# Patient Record
Sex: MALE | Race: BLACK OR AFRICAN AMERICAN | NOT HISPANIC OR LATINO | Employment: OTHER | URBAN - METROPOLITAN AREA
[De-identification: names, ages, dates, MRNs, and addresses within clinical notes are randomized per-mention and may not be internally consistent; named-entity substitution may affect disease eponyms.]

---

## 2019-02-17 ENCOUNTER — APPOINTMENT (OUTPATIENT)
Dept: ULTRASOUND IMAGING | Facility: HOSPITAL | Age: 49
End: 2019-02-17

## 2019-02-17 ENCOUNTER — HOSPITAL ENCOUNTER (OUTPATIENT)
Facility: HOSPITAL | Age: 49
Setting detail: OBSERVATION
Discharge: HOME | End: 2019-02-17
Attending: EMERGENCY MEDICINE | Admitting: EMERGENCY MEDICINE

## 2019-02-17 ENCOUNTER — APPOINTMENT (OUTPATIENT)
Dept: CT IMAGING | Facility: HOSPITAL | Age: 49
End: 2019-02-17

## 2019-02-17 VITALS
TEMPERATURE: 99.1 F | WEIGHT: 214.8 LBS | OXYGEN SATURATION: 95 % | HEART RATE: 83 BPM | HEIGHT: 72 IN | SYSTOLIC BLOOD PRESSURE: 178 MMHG | BODY MASS INDEX: 29.09 KG/M2 | DIASTOLIC BLOOD PRESSURE: 104 MMHG | RESPIRATION RATE: 18 BRPM

## 2019-02-17 DIAGNOSIS — R11.2 NON-INTRACTABLE VOMITING WITH NAUSEA, UNSPECIFIED VOMITING TYPE: ICD-10-CM

## 2019-02-17 DIAGNOSIS — K85.00 IDIOPATHIC ACUTE PANCREATITIS, UNSPECIFIED COMPLICATION STATUS: Primary | ICD-10-CM

## 2019-02-17 PROBLEM — D72.829 LEUKOCYTOSIS: Status: ACTIVE | Noted: 2019-02-17

## 2019-02-17 PROBLEM — I10 HTN (HYPERTENSION): Status: ACTIVE | Noted: 2019-02-17

## 2019-02-17 PROBLEM — R10.9 ABDOMINAL PAIN: Status: ACTIVE | Noted: 2019-02-17

## 2019-02-17 PROBLEM — R73.9 HYPERGLYCEMIA: Status: ACTIVE | Noted: 2019-02-17

## 2019-02-17 LAB
ALBUMIN SERPL-MCNC: 4.93 G/DL (ref 3.2–4.8)
ALBUMIN/GLOB SERPL: 1.5 G/DL (ref 1.5–2.5)
ALP SERPL-CCNC: 66 U/L (ref 25–100)
ALT SERPL W P-5'-P-CCNC: 23 U/L (ref 7–40)
ANION GAP SERPL CALCULATED.3IONS-SCNC: 8 MMOL/L (ref 3–11)
ARTICHOKE IGE QN: 100 MG/DL (ref 0–130)
AST SERPL-CCNC: 35 U/L (ref 0–33)
BASOPHILS # BLD AUTO: 0.02 10*3/MM3 (ref 0–0.2)
BASOPHILS NFR BLD AUTO: 0.2 % (ref 0–1)
BILIRUB SERPL-MCNC: 0.7 MG/DL (ref 0.3–1.2)
BILIRUB UR QL STRIP: NEGATIVE
BUN BLD-MCNC: 12 MG/DL (ref 9–23)
BUN/CREAT SERPL: 9.3 (ref 7–25)
CALCIUM SPEC-SCNC: 9.9 MG/DL (ref 8.7–10.4)
CHLORIDE SERPL-SCNC: 105 MMOL/L (ref 99–109)
CHOLEST SERPL-MCNC: 168 MG/DL (ref 0–200)
CLARITY UR: CLEAR
CO2 SERPL-SCNC: 26 MMOL/L (ref 20–31)
COLOR UR: YELLOW
CREAT BLD-MCNC: 1.29 MG/DL (ref 0.6–1.3)
DEPRECATED RDW RBC AUTO: 44.2 FL (ref 37–54)
EOSINOPHIL # BLD AUTO: 0.08 10*3/MM3 (ref 0–0.3)
EOSINOPHIL NFR BLD AUTO: 0.7 % (ref 0–3)
ERYTHROCYTE [DISTWIDTH] IN BLOOD BY AUTOMATED COUNT: 12.5 % (ref 11.3–14.5)
GFR SERPL CREATININE-BSD FRML MDRD: 73 ML/MIN/1.73
GLOBULIN UR ELPH-MCNC: 3.4 GM/DL
GLUCOSE BLD-MCNC: 188 MG/DL (ref 70–100)
GLUCOSE UR STRIP-MCNC: ABNORMAL MG/DL
HBA1C MFR BLD: 5.6 % (ref 4.8–5.6)
HCT VFR BLD AUTO: 43.3 % (ref 38.9–50.9)
HDLC SERPL-MCNC: 29 MG/DL (ref 40–60)
HGB BLD-MCNC: 14.3 G/DL (ref 13.1–17.5)
HGB UR QL STRIP.AUTO: NEGATIVE
HOLD SPECIMEN: NORMAL
IMM GRANULOCYTES # BLD AUTO: 0.07 10*3/MM3 (ref 0–0.05)
IMM GRANULOCYTES NFR BLD AUTO: 0.6 % (ref 0–0.6)
KETONES UR QL STRIP: NEGATIVE
LEUKOCYTE ESTERASE UR QL STRIP.AUTO: NEGATIVE
LIPASE SERPL-CCNC: 259 U/L (ref 6–51)
LYMPHOCYTES # BLD AUTO: 2.72 10*3/MM3 (ref 0.6–4.8)
LYMPHOCYTES NFR BLD AUTO: 22.7 % (ref 24–44)
MAGNESIUM SERPL-MCNC: 2 MG/DL (ref 1.3–2.7)
MCH RBC QN AUTO: 31.8 PG (ref 27–31)
MCHC RBC AUTO-ENTMCNC: 33 G/DL (ref 32–36)
MCV RBC AUTO: 96.2 FL (ref 80–99)
MONOCYTES # BLD AUTO: 0.57 10*3/MM3 (ref 0–1)
MONOCYTES NFR BLD AUTO: 4.8 % (ref 0–12)
NEUTROPHILS # BLD AUTO: 8.61 10*3/MM3 (ref 1.5–8.3)
NEUTROPHILS NFR BLD AUTO: 71.6 % (ref 41–71)
NITRITE UR QL STRIP: NEGATIVE
PH UR STRIP.AUTO: 8 [PH] (ref 5–8)
PLATELET # BLD AUTO: 263 10*3/MM3 (ref 150–450)
PMV BLD AUTO: 10.6 FL (ref 6–12)
POTASSIUM BLD-SCNC: 3.5 MMOL/L (ref 3.5–5.5)
PROT SERPL-MCNC: 8.3 G/DL (ref 5.7–8.2)
PROT UR QL STRIP: NEGATIVE
RBC # BLD AUTO: 4.5 10*6/MM3 (ref 4.2–5.76)
SODIUM BLD-SCNC: 139 MMOL/L (ref 132–146)
SP GR UR STRIP: 1.01 (ref 1–1.03)
TRIGL SERPL-MCNC: 235 MG/DL (ref 0–150)
UROBILINOGEN UR QL STRIP: ABNORMAL
WBC NRBC COR # BLD: 12 10*3/MM3 (ref 3.5–10.8)
WHOLE BLOOD HOLD SPECIMEN: NORMAL

## 2019-02-17 PROCEDURE — 80053 COMPREHEN METABOLIC PANEL: CPT | Performed by: EMERGENCY MEDICINE

## 2019-02-17 PROCEDURE — 83036 HEMOGLOBIN GLYCOSYLATED A1C: CPT | Performed by: NURSE PRACTITIONER

## 2019-02-17 PROCEDURE — 25010000002 HYDROMORPHONE PER 4 MG: Performed by: NURSE PRACTITIONER

## 2019-02-17 PROCEDURE — 80061 LIPID PANEL: CPT | Performed by: NURSE PRACTITIONER

## 2019-02-17 PROCEDURE — 81003 URINALYSIS AUTO W/O SCOPE: CPT | Performed by: EMERGENCY MEDICINE

## 2019-02-17 PROCEDURE — G0378 HOSPITAL OBSERVATION PER HR: HCPCS

## 2019-02-17 PROCEDURE — 83690 ASSAY OF LIPASE: CPT | Performed by: EMERGENCY MEDICINE

## 2019-02-17 PROCEDURE — 96375 TX/PRO/DX INJ NEW DRUG ADDON: CPT

## 2019-02-17 PROCEDURE — 25010000002 HYDROMORPHONE 1 MG/ML SOLUTION: Performed by: EMERGENCY MEDICINE

## 2019-02-17 PROCEDURE — 96372 THER/PROPH/DIAG INJ SC/IM: CPT

## 2019-02-17 PROCEDURE — 96374 THER/PROPH/DIAG INJ IV PUSH: CPT

## 2019-02-17 PROCEDURE — 96376 TX/PRO/DX INJ SAME DRUG ADON: CPT

## 2019-02-17 PROCEDURE — 25010000002 ENOXAPARIN PER 10 MG: Performed by: NURSE PRACTITIONER

## 2019-02-17 PROCEDURE — 85025 COMPLETE CBC W/AUTO DIFF WBC: CPT | Performed by: EMERGENCY MEDICINE

## 2019-02-17 PROCEDURE — 25010000002 ONDANSETRON PER 1 MG: Performed by: EMERGENCY MEDICINE

## 2019-02-17 PROCEDURE — 25010000002 IOPAMIDOL 61 % SOLUTION: Performed by: EMERGENCY MEDICINE

## 2019-02-17 PROCEDURE — 83735 ASSAY OF MAGNESIUM: CPT | Performed by: EMERGENCY MEDICINE

## 2019-02-17 PROCEDURE — 99236 HOSP IP/OBS SAME DATE HI 85: CPT | Performed by: INTERNAL MEDICINE

## 2019-02-17 PROCEDURE — 99285 EMERGENCY DEPT VISIT HI MDM: CPT

## 2019-02-17 PROCEDURE — 74177 CT ABD & PELVIS W/CONTRAST: CPT

## 2019-02-17 RX ORDER — ONDANSETRON 2 MG/ML
4 INJECTION INTRAMUSCULAR; INTRAVENOUS EVERY 6 HOURS PRN
Status: DISCONTINUED | OUTPATIENT
Start: 2019-02-17 | End: 2019-02-17 | Stop reason: HOSPADM

## 2019-02-17 RX ORDER — SODIUM CHLORIDE 0.9 % (FLUSH) 0.9 %
10 SYRINGE (ML) INJECTION AS NEEDED
Status: DISCONTINUED | OUTPATIENT
Start: 2019-02-17 | End: 2019-02-17 | Stop reason: HOSPADM

## 2019-02-17 RX ORDER — DILTIAZEM HYDROCHLORIDE 240 MG/1
240 CAPSULE, COATED, EXTENDED RELEASE ORAL DAILY
COMMUNITY

## 2019-02-17 RX ORDER — PANTOPRAZOLE SODIUM 40 MG/10ML
40 INJECTION, POWDER, LYOPHILIZED, FOR SOLUTION INTRAVENOUS
Status: DISCONTINUED | OUTPATIENT
Start: 2019-02-17 | End: 2019-02-17 | Stop reason: HOSPADM

## 2019-02-17 RX ORDER — FUROSEMIDE 20 MG/1
20 TABLET ORAL DAILY PRN
COMMUNITY

## 2019-02-17 RX ORDER — HYDRALAZINE HYDROCHLORIDE 25 MG/1
25 TABLET, FILM COATED ORAL 2 TIMES DAILY
Status: DISCONTINUED | OUTPATIENT
Start: 2019-02-17 | End: 2019-02-17 | Stop reason: HOSPADM

## 2019-02-17 RX ORDER — HYDRALAZINE HYDROCHLORIDE 25 MG/1
25 TABLET, FILM COATED ORAL 2 TIMES DAILY
COMMUNITY

## 2019-02-17 RX ORDER — SODIUM CHLORIDE 0.9 % (FLUSH) 0.9 %
3 SYRINGE (ML) INJECTION EVERY 12 HOURS SCHEDULED
Status: DISCONTINUED | OUTPATIENT
Start: 2019-02-17 | End: 2019-02-17 | Stop reason: HOSPADM

## 2019-02-17 RX ORDER — ONDANSETRON 4 MG/1
4 TABLET, FILM COATED ORAL EVERY 6 HOURS PRN
Status: DISCONTINUED | OUTPATIENT
Start: 2019-02-17 | End: 2019-02-17 | Stop reason: HOSPADM

## 2019-02-17 RX ORDER — DILTIAZEM HYDROCHLORIDE 120 MG/1
240 CAPSULE, COATED, EXTENDED RELEASE ORAL
Status: DISCONTINUED | OUTPATIENT
Start: 2019-02-17 | End: 2019-02-17 | Stop reason: HOSPADM

## 2019-02-17 RX ORDER — SODIUM CHLORIDE 9 MG/ML
125 INJECTION, SOLUTION INTRAVENOUS CONTINUOUS
Status: DISCONTINUED | OUTPATIENT
Start: 2019-02-17 | End: 2019-02-17 | Stop reason: HOSPADM

## 2019-02-17 RX ORDER — HYDRALAZINE HYDROCHLORIDE 20 MG/ML
10 INJECTION INTRAMUSCULAR; INTRAVENOUS EVERY 6 HOURS PRN
Status: DISCONTINUED | OUTPATIENT
Start: 2019-02-17 | End: 2019-02-17

## 2019-02-17 RX ORDER — SODIUM CHLORIDE 0.9 % (FLUSH) 0.9 %
3-10 SYRINGE (ML) INJECTION AS NEEDED
Status: DISCONTINUED | OUTPATIENT
Start: 2019-02-17 | End: 2019-02-17 | Stop reason: HOSPADM

## 2019-02-17 RX ORDER — ONDANSETRON 2 MG/ML
4 INJECTION INTRAMUSCULAR; INTRAVENOUS ONCE
Status: COMPLETED | OUTPATIENT
Start: 2019-02-17 | End: 2019-02-17

## 2019-02-17 RX ORDER — HYDROMORPHONE HYDROCHLORIDE 1 MG/ML
0.5 INJECTION, SOLUTION INTRAMUSCULAR; INTRAVENOUS; SUBCUTANEOUS EVERY 4 HOURS PRN
Status: DISCONTINUED | OUTPATIENT
Start: 2019-02-17 | End: 2019-02-17 | Stop reason: HOSPADM

## 2019-02-17 RX ADMIN — PANTOPRAZOLE SODIUM 40 MG: 40 INJECTION, POWDER, FOR SOLUTION INTRAVENOUS at 09:26

## 2019-02-17 RX ADMIN — SODIUM CHLORIDE 125 ML/HR: 9 INJECTION, SOLUTION INTRAVENOUS at 05:54

## 2019-02-17 RX ADMIN — IOPAMIDOL 85 ML: 612 INJECTION, SOLUTION INTRAVENOUS at 03:08

## 2019-02-17 RX ADMIN — HYDROMORPHONE HYDROCHLORIDE 0.5 MG: 1 INJECTION, SOLUTION INTRAMUSCULAR; INTRAVENOUS; SUBCUTANEOUS at 06:03

## 2019-02-17 RX ADMIN — HYDROMORPHONE HYDROCHLORIDE 1 MG: 1 INJECTION, SOLUTION INTRAMUSCULAR; INTRAVENOUS; SUBCUTANEOUS at 02:49

## 2019-02-17 RX ADMIN — HYDRALAZINE HYDROCHLORIDE 25 MG: 25 TABLET, FILM COATED ORAL at 09:26

## 2019-02-17 RX ADMIN — ENOXAPARIN SODIUM 40 MG: 40 INJECTION SUBCUTANEOUS at 09:26

## 2019-02-17 RX ADMIN — SODIUM CHLORIDE 1000 ML: 9 INJECTION, SOLUTION INTRAVENOUS at 02:48

## 2019-02-17 RX ADMIN — DILTIAZEM HYDROCHLORIDE 240 MG: 120 CAPSULE, COATED, EXTENDED RELEASE ORAL at 11:32

## 2019-02-17 RX ADMIN — ONDANSETRON 4 MG: 2 INJECTION INTRAMUSCULAR; INTRAVENOUS at 02:49

## 2019-02-17 RX ADMIN — SODIUM CHLORIDE, PRESERVATIVE FREE 3 ML: 5 INJECTION INTRAVENOUS at 09:27

## 2019-02-17 RX ADMIN — HYDROMORPHONE HYDROCHLORIDE 1 MG: 1 INJECTION, SOLUTION INTRAMUSCULAR; INTRAVENOUS; SUBCUTANEOUS at 04:10

## 2019-02-17 NOTE — ED PROVIDER NOTES
Subjective   48-year-old male presents complaining of abdominal pain.  Of note, the patient states that he has a history of prior idiopathic pancreatitis and has not been hospitalized for more than one year.  He states that his last hospitalization was in [place].  This evening, approximately 4 hours prior to arrival the patient began experiencing significant periumbilical abdominal pain that was nonradiating in nature.  He endorses accompanying nausea and vomiting.  Pain is 10 out of 10 in severity and feels similar to the pain that he is experienced in the past with prior pancreatitis episodes.  He is unsure as to what may have triggered his symptoms.  He denies any sick contacts, recent travel, or recent diet changes.  He denies any chest pain.        History provided by:  Patient  Abdominal Pain   Pain location:  Epigastric  Pain radiates to:  Back  Pain severity:  Severe  Onset quality:  Sudden  Duration:  4 hours  Timing:  Intermittent  Progression:  Unchanged  Chronicity:  New  Relieved by:  None tried  Worsened by:  Palpation  Ineffective treatments:  None tried  Associated symptoms: nausea and vomiting    Associated symptoms: no chills, no constipation, no dysuria, no fever, no hematuria and no melena        Review of Systems   Constitutional: Negative for chills and fever.   Gastrointestinal: Positive for abdominal pain, nausea and vomiting. Negative for blood in stool, constipation and melena.   Genitourinary: Negative for dysuria and hematuria.   All other systems reviewed and are negative.      Past Medical History:   Diagnosis Date   • Hypertension    • Pancreatitis        No Known Allergies    Past Surgical History:   Procedure Laterality Date   • APPENDECTOMY         History reviewed. No pertinent family history.    Social History     Tobacco Use   • Smoking status: Never Smoker   Substance and Sexual Activity   • Alcohol use: Yes     Comment: EVERY ONCE IN A WHILE   • Drug use: Yes     Types:  Marijuana     Comment: OCCASIONALLY         Objective   Physical Exam   Constitutional: He is oriented to person, place, and time. He appears well-developed and well-nourished.   Nontoxic appearing male, uncomfortable   HENT:   Head: Normocephalic and atraumatic.   Mouth/Throat: Oropharynx is clear and moist.   Neck: Normal range of motion. No JVD present.   Cardiovascular: Regular rhythm and normal heart sounds. Exam reveals no gallop and no friction rub.   No murmur heard.  Tachycardic   Pulmonary/Chest: Effort normal and breath sounds normal. No respiratory distress. He has no wheezes. He has no rales.   Abdominal: Soft. Bowel sounds are normal. He exhibits no distension and no mass. There is tenderness. There is guarding.   Periumbilical abdominal tenderness with voluntary guarding present   Musculoskeletal: Normal range of motion.   Neurological: He is alert and oriented to person, place, and time.   Skin: Skin is warm and dry. No rash noted. He is not diaphoretic. No erythema. No pallor.   Psychiatric: He has a normal mood and affect. Judgment and thought content normal.   Nursing note and vitals reviewed.      Procedures         ED Course  ED Course as of Feb 17 0534   Sun Feb 17, 2019   0533 48-year-old male with a history of idiopathic pancreatitis presents complaining of abdominal pain and nausea/vomiting for the past several hours.  On arrival to the ED, patient nontoxic appearing but uncomfortable and in pain.  Labs remarkable for elevated lipase and otherwise unrevealing.  CT of abdomen/pelvis negative for acute emergent or surgical process.  His clinical presentation appears consistent with pancreatitis.  He does not drink alcohol.  Upon reevaluation following IV fluids and medications, patient still in significant pain.  I feel that he warrants inpatient treatment at this time.  I discussed his case with Dr. Sanchez, and we will admit for further evaluation and treatment.  The patient is hemodynamically  "stable and aware/agreeable with the plan  [DD]      ED Course User Index  [DD] Christensen, Jorge Watkins MD      No results found for this or any previous visit (from the past 24 hour(s)).  Note: In addition to lab results from this visit, the labs listed above may include labs taken at another facility or during a different encounter within the last 24 hours. Please correlate lab times with ED admission and discharge times for further clarification of the services performed during this visit.    CT Abdomen Pelvis With Contrast    (Results Pending)     Vitals:    02/17/19 0224 02/17/19 0228   BP:  (!) 196/162   Pulse: 75    Resp: 22    Temp:  97.6 °F (36.4 °C)   TempSrc: Oral    SpO2: 100%    Weight: 93 kg (205 lb)    Height: 182.9 cm (72\")      Medications   sodium chloride 0.9 % flush 10 mL (not administered)   sodium chloride 0.9 % bolus 1,000 mL (not administered)   HYDROmorphone (DILAUDID) injection 1 mg (not administered)   ondansetron (ZOFRAN) injection 4 mg (not administered)     ECG/EMG Results (last 24 hours)     ** No results found for the last 24 hours. **        No orders to display                   Recent Results (from the past 24 hour(s))   Comprehensive Metabolic Panel    Collection Time: 02/17/19  2:40 AM   Result Value Ref Range    Glucose 188 (H) 70 - 100 mg/dL    BUN 12 9 - 23 mg/dL    Creatinine 1.29 0.60 - 1.30 mg/dL    Sodium 139 132 - 146 mmol/L    Potassium 3.5 3.5 - 5.5 mmol/L    Chloride 105 99 - 109 mmol/L    CO2 26.0 20.0 - 31.0 mmol/L    Calcium 9.9 8.7 - 10.4 mg/dL    Total Protein 8.3 (H) 5.7 - 8.2 g/dL    Albumin 4.93 (H) 3.20 - 4.80 g/dL    ALT (SGPT) 23 7 - 40 U/L    AST (SGOT) 35 (H) 0 - 33 U/L    Alkaline Phosphatase 66 25 - 100 U/L    Total Bilirubin 0.7 0.3 - 1.2 mg/dL    eGFR  African Amer 73 >60 mL/min/1.73    Globulin 3.4 gm/dL    A/G Ratio 1.5 1.5 - 2.5 g/dL    BUN/Creatinine Ratio 9.3 7.0 - 25.0    Anion Gap 8.0 3.0 - 11.0 mmol/L   Lipase    Collection Time: 02/17/19  2:40 AM "   Result Value Ref Range    Lipase 259 (H) 6 - 51 U/L   Urinalysis With Microscopic If Indicated (No Culture) - Urine, Clean Catch    Collection Time: 02/17/19  2:40 AM   Result Value Ref Range    Color, UA Yellow Yellow, Straw    Appearance, UA Clear Clear    pH, UA 8.0 5.0 - 8.0    Specific Gravity, UA 1.015 1.001 - 1.030    Glucose,  mg/dL (Trace) (A) Negative    Ketones, UA Negative Negative    Bilirubin, UA Negative Negative    Blood, UA Negative Negative    Protein, UA Negative Negative    Leuk Esterase, UA Negative Negative    Nitrite, UA Negative Negative    Urobilinogen, UA 0.2 E.U./dL 0.2 - 1.0 E.U./dL   Green Top (Gel)    Collection Time: 02/17/19  2:40 AM   Result Value Ref Range    Extra Tube Hold for add-ons.    Lavender Top    Collection Time: 02/17/19  2:40 AM   Result Value Ref Range    Extra Tube hold for add-on    CBC Auto Differential    Collection Time: 02/17/19  2:40 AM   Result Value Ref Range    WBC 12.00 (H) 3.50 - 10.80 10*3/mm3    RBC 4.50 4.20 - 5.76 10*6/mm3    Hemoglobin 14.3 13.1 - 17.5 g/dL    Hematocrit 43.3 38.9 - 50.9 %    MCV 96.2 80.0 - 99.0 fL    MCH 31.8 (H) 27.0 - 31.0 pg    MCHC 33.0 32.0 - 36.0 g/dL    RDW 12.5 11.3 - 14.5 %    RDW-SD 44.2 37.0 - 54.0 fl    MPV 10.6 6.0 - 12.0 fL    Platelets 263 150 - 450 10*3/mm3    Neutrophil % 71.6 (H) 41.0 - 71.0 %    Lymphocyte % 22.7 (L) 24.0 - 44.0 %    Monocyte % 4.8 0.0 - 12.0 %    Eosinophil % 0.7 0.0 - 3.0 %    Basophil % 0.2 0.0 - 1.0 %    Immature Grans % 0.6 0.0 - 0.6 %    Neutrophils, Absolute 8.61 (H) 1.50 - 8.30 10*3/mm3    Lymphocytes, Absolute 2.72 0.60 - 4.80 10*3/mm3    Monocytes, Absolute 0.57 0.00 - 1.00 10*3/mm3    Eosinophils, Absolute 0.08 0.00 - 0.30 10*3/mm3    Basophils, Absolute 0.02 0.00 - 0.20 10*3/mm3    Immature Grans, Absolute 0.07 (H) 0.00 - 0.05 10*3/mm3   Magnesium    Collection Time: 02/17/19  2:40 AM   Result Value Ref Range    Magnesium 2.0 1.3 - 2.7 mg/dL     Note: In addition to lab results  from this visit, the labs listed above may include labs taken at another facility or during a different encounter within the last 24 hours. Please correlate lab times with ED admission and discharge times for further clarification of the services performed during this visit.    CT Abdomen Pelvis With Contrast   Final Result   No acute abdominal or pelvic abnormality.      THIS DOCUMENT HAS BEEN ELECTRONICALLY SIGNED BY RICARDO NAVARRETE MD        Vitals:    02/17/19 0400 02/17/19 0408 02/17/19 0413 02/17/19 0430   BP: (!) 196/129   170/99   BP Location:    Right arm   Patient Position:    Lying   Pulse: 55 69 54 75   Resp:       Temp:       TempSrc:       SpO2: 100% (!) 89% 100% 100%   Weight:       Height:         Medications   sodium chloride 0.9 % flush 10 mL (not administered)   sodium chloride 0.9 % bolus 1,000 mL (0 mL Intravenous Stopped 2/17/19 0402)   HYDROmorphone (DILAUDID) injection 1 mg (1 mg Intravenous Given 2/17/19 0249)   ondansetron (ZOFRAN) injection 4 mg (4 mg Intravenous Given 2/17/19 0249)   iopamidol (ISOVUE-300) 61 % injection 100 mL (85 mL Intravenous Given 2/17/19 0308)   HYDROmorphone (DILAUDID) injection 1 mg (1 mg Intravenous Given 2/17/19 0410)     ECG/EMG Results (last 24 hours)     ** No results found for the last 24 hours. **        No orders to display             MDM    Final diagnoses:   Idiopathic acute pancreatitis, unspecified complication status   Non-intractable vomiting with nausea, unspecified vomiting type       Documentation assistance provided by mansi Burgos.  Information recorded by the scribe was done at my direction and has been verified and validated by me.     Angelique Burgos  02/17/19 5446       Jorge Christensen MD  02/17/19 0771

## 2019-02-17 NOTE — H&P
"    [facility] Medicine Services  HISTORY AND PHYSICAL    Patient Name: Primo Fernandez  : 1970  MRN: [id number]  Primary Care Physician: Porter, No Known  Date of admission: 2019      Subjective   Subjective     Chief Complaint:  Abdominal pain    HPI:  Primo Fernandez is a 48 y.o. male with a history of HTN and pancreatitis who presented to the ED w/ c/o abdominal pain and N/V/D. Patient reports that he felt great all day with no issues. Around midnight he ate a slice of pizza. 30 minutes after eating he reports feeling \"shaky and ill\" followed by diaphoresis. He then developed N/V/D, reporting that he had 6-10 episodes of vomiting with associated abdominal pain that continued to worsen prompting his ED visit. Patient reports that he has had similar episodes in the past when he was diagnosed with pancreatitis. He was diagnosed with pancreatitis in [place] 4 months ago and 1 year prior to that he was dx w/ pancreatitis for the first time. He reports that the first episode was due to over use of alcohol. He denies any recent alcohol use. Patient denies fever/chills, chest pain, dyspnea, cough, melena, edema, syncope, confusion. Patient w/ continued abdominal pain despite IV pain medication given in ED, lipase @ 259, CT abd/pel negative. Patient admitted to the hospital medicine service for further evaluation.     Review of Systems   Constitutional: Positive for diaphoresis.   HENT: Negative.    Eyes: Negative.    Respiratory: Negative.    Cardiovascular: Negative.    Gastrointestinal: Positive for abdominal pain, diarrhea, nausea and vomiting.        Sudden onset N/V/D and abdominal/epigastric pain after eating pizza tonight.    Endocrine: Negative.    Genitourinary: Negative.    Musculoskeletal: Negative.    Skin: Negative.    Allergic/Immunologic: Negative.    Neurological: Negative.    Hematological: Negative.    Psychiatric/Behavioral: Negative.    All other systems reviewed " and are negative.         Otherwise complete ROS reviewed and is negative except as mentioned in the HPI.    Personal History     Past Medical History:   Diagnosis Date   • Hypertension    • Pancreatitis        Past Surgical History:   Procedure Laterality Date   • APPENDECTOMY     • KNEE SURGERY      tendon repair after trauma        Family History: family history includes Cancer in his mother; No Known Problems in his father. Otherwise pertinent FHx was reviewed and unremarkable.     Social History:  reports that  has never smoked. he has never used smokeless tobacco. He reports that he drinks alcohol. He reports that he uses drugs. Drug: Marijuana.  Social History     Social History Narrative   • Not on file       Medications:    Available home medication information reviewed.  No medications prior to admission.       No Known Allergies    Objective   Objective     Vital Signs:   Temp:  [97.6 °F (36.4 °C)] 97.6 °F (36.4 °C)  Heart Rate:  [54-75] 68  Resp:  [22] 22  BP: (159-208)/() 159/101        Physical Exam   Constitutional: He is oriented to person, place, and time. He appears well-developed and well-nourished. No distress.   HENT:   Head: Normocephalic and atraumatic.   Eyes: EOM are normal. Pupils are equal, round, and reactive to light.   Neck: Normal range of motion. Neck supple.   Cardiovascular: Normal rate, regular rhythm, normal heart sounds and intact distal pulses. Exam reveals no gallop and no friction rub.   No murmur heard.  Pulmonary/Chest: Effort normal and breath sounds normal. No stridor. No respiratory distress. He has no wheezes. He has no rales. He exhibits no tenderness.   Abdominal: Soft. Bowel sounds are normal. He exhibits no distension. There is tenderness. There is no guarding.   Tender w/ palpation-epigastric/RUQ.    Musculoskeletal: Normal range of motion. He exhibits no edema.   Neurological: He is alert and oriented to person, place, and time. No cranial nerve deficit.    Skin: Skin is warm and dry. Capillary refill takes 2 to 3 seconds. No rash noted. He is not diaphoretic. No erythema. No pallor.   Psychiatric: He has a normal mood and affect. His behavior is normal. Judgment and thought content normal.   Nursing note and vitals reviewed.         Results Reviewed:  I have personally reviewed current lab, radiology, and data and agree.    Results from last 7 days   Lab Units 02/17/19  0240   WBC 10*3/mm3 12.00*   HEMOGLOBIN g/dL 14.3   HEMATOCRIT % 43.3   PLATELETS 10*3/mm3 263     Results from last 7 days   Lab Units 02/17/19  0240   SODIUM mmol/L 139   POTASSIUM mmol/L 3.5   CHLORIDE mmol/L 105   CO2 mmol/L 26.0   BUN mg/dL 12   CREATININE mg/dL 1.29   GLUCOSE mg/dL 188*   CALCIUM mg/dL 9.9   ALT (SGPT) U/L 23   AST (SGOT) U/L 35*     Estimated Creatinine Clearance: 94.1 mL/min (by C-G formula based on SCr of 1.29 mg/dL).  Brief Urine Lab Results  (Last result in the past 365 days)      Color   Clarity   Blood   Leuk Est   Nitrite   Protein   CREAT   Urine HCG        02/17/19 0240 Yellow Clear Negative Negative Negative Negative             No results found for: BNP  Imaging Results (last 24 hours)     Procedure Component Value Units Date/Time    CT Abdomen Pelvis With Contrast [[id number]] Collected:  02/17/19 0305     Updated:  02/17/19 0436    Narrative:       EXAM:    CT Abdomen and Pelvis With Contrast     EXAM DATE/TIME:    2/17/2019 3:05 AM     CLINICAL HISTORY:    48 years old, male; Pain; Abdominal pain; Generalized; Additional info: Abd   pain and n/v     TECHNIQUE:    Axial computed tomography images of the abdomen and pelvis with intravenous   contrast.    All CT scans at this facility use at least one of these dose optimization   techniques: automated exposure control; mA and/or kV adjustment per patient   size (includes targeted exams where dose is matched to clinical indication); or   iterative reconstruction.    Coronal reformatted images were created and  reviewed.     CONTRAST:    85 ml of isovue 300 administered intravenously.     COMPARISON:    No relevant prior studies available.     FINDINGS:    Lower thorax: No acute findings.     ABDOMEN:    Liver: Normal.     Gallbladder and bile ducts: Normal.     Pancreas: Normal.     Spleen: Normal.     Adrenals: Normal.     Kidneys and ureters:  Duplicated left renal collecting system, without acute   complications.    Stomach and bowel: Normal.    Appendix: No evidence of appendicitis.     PELVIS:    Bladder: Unremarkable as visualized.    Reproductive: Unremarkable as visualized.     ABDOMEN and PELVIS:    Intraperitoneal space: Normal. No free air. No significant fluid collection.    Bones/joints: No acute abnormality.    Soft tissues: Normal.    Vasculature: Normal. No abdominal aortic aneurysm.    Lymph nodes: Normal. No enlarged lymph nodes.       Impression:       No acute abdominal or pelvic abnormality.    THIS DOCUMENT HAS BEEN ELECTRONICALLY SIGNED BY RICARDO NAVARRETE MD             Assessment/Plan   Assessment / Plan     Active Hospital Problems    Diagnosis Date Noted   • **Idiopathic acute pancreatitis [K85.00] 02/17/2019   • Leukocytosis [D72.829] 02/17/2019   • Abdominal pain [R10.9] 02/17/2019   • Hyperglycemia [R73.9] 02/17/2019   • HTN (hypertension) [I10] 02/17/2019       Assessment & Plan    **Acute pancreatitis   -lipase 259  -CT abd/pel negative for acute findings  -s/p 1 liter NS, zofran and Dilaudid 1 mg IV x2 in ED w/ improvement in symptoms  -continue NS @ 125ml/hour  -NPO  -lipid panel pending; hx of alcohol induced pancreatitis, but patient denies recent alcohol use  -symptom mgt    **Abdominal pain  -symptom mgt  -dilaudid PRN ordered; de-escalate as tolerated    **Leukocytosis  -reactive?  -UA negative, afebrile  -monitor    **HTN  -patient reports taking diltiazem, lasix PRN and hydralazine- however, RN in ED called pharmacy because pt could not initially remember and pharmacy said that patient  had not filled since 7/10/18 and pt told one staff member in ED that he had not taken meds in several days???  -will start hydralazine now and hold on lasix and diltiazem- unclear why he was on diltiazem?  -monitor    **Hyperglycemia  -denies hx of diabetes  -hemoglobin A1C pending    DVT prophylaxis:  Lovenox, scds    CODE STATUS:    Code Status and Medical Interventions:   Ordered at: 02/17/19 0543     Code Status:    CPR     Medical Interventions (Level of Support Prior to Arrest):    Full       Admission Status:  I believe this patient meets OBSERVATION status, however if further evaluation or treatment plans warrant, status may change.  Based upon current information, I predict patient's care encounter to be less than or equal to 2 midnights.  Electronically signed by XIMENA Hutson, 02/17/19, 5:45 AM.    Brief Attending Admission Attestation     I have seen and examined the patient, performing an independent face-to-face diagnostic evaluation with plan of care reviewed and developed with the advanced practice clinician (APC).      Brief Summary Statement:   Primo Fernandez is a 48 y.o. male has had prior history of alcohol related pancreatitis but denies current alcohol abuse.  Patient presents with epigastric pain of several hours duration associated with nausea, nonbloody emesis and nonbloody diarrhea.  Symptoms reportedly started after eating a slice of pizza.  No report of fever, chills, chest pain, dyspnea, syncope or weakness of extremities.  Serum lipase is elevated at 259 but CT scan of the abdomen does not show acute findings.  Patient denies current alcohol abuse or illicit drug use.  He takes medications for blood pressure but does not remember their names.  We'll keep nothing by mouth and continue IV hydration with pain control.  We'll obtain right upper quadrant ultrasound and check fasting lipid profile.  We'll review home medications.  Remainder of detailed HPI is as noted above and has  been reviewed and/or edited by me for completeness.      Attending Physical Exam:  Constitutional: No acute distress, awake, alert  Eyes: PERRLA, sclerae anicteric, no conjunctival injection  HENT: NCAT, mucous membranes moist  Neck: Supple, no thyromegaly, no lymphadenopathy, trachea midline  Respiratory: Clear to auscultation bilaterally, nonlabored respirations   Cardiovascular: RRR, no murmurs, rubs, or gallops, palpable pedal pulses bilaterally  Gastrointestinal: Positive bowel sounds, soft, tenderness in the epigastrium, nondistended  Musculoskeletal: No bilateral ankle edema, no clubbing or cyanosis to extremities  Psychiatric: Appropriate affect, cooperative  Neurologic: Oriented x 3, strength symmetric in all extremities, Cranial Nerves grossly intact, speech clear  Skin: No rashes      Brief Assessment/Plan :  See above for further detailed assessment and plan developed with APC which I have reviewed and/or edited for completeness.      Electronically signed by Medardo Sanchez MD, 02/17/19, 6:32 AM.

## 2019-02-17 NOTE — PROGRESS NOTES
"    [facility] Medicine Services  PROGRESS NOTE    Patient Name: Primo Fernandez  : 1970  MRN: [id number]    Date of Admission: 2019  Length of Stay: 0  Primary Care Physician: Provider, No Known    Subjective   Subjective     CC:  Acute epgastric pain and severe vomiting after eating pizza.     HPI:  Feels fine.  \"Ready to get home\".  No pain except throat irritated from vomiting 10 times prior to admission.     Review of Systems   Asserts that he takes his BP meds - has had samples at home.   No GI distress  Otherwise ROS is negative except as mentioned in the HPI.    Objective   Objective     Vital Signs:   Temp:  [97.6 °F (36.4 °C)-98.2 °F (36.8 °C)] 98.2 °F (36.8 °C)  Heart Rate:  [54-89] 80  Resp:  [18-22] 18  BP: (144-208)/() (P) 180/90        Physical Exam:  Constitutional: No acute distress, awake, alert, healthy looking male  Eyes: PERRLA, sclerae anicteric, no conjunctival injection  HENT: NCAT, mucous membranes moist  Neck: Supple, no thyromegaly, no lymphadenopathy, trachea midline  Respiratory: Clear to auscultation bilaterally, nonlabored respirations   Cardiovascular: RRR, no murmurs, rubs, or gallops, palpable pedal pulses bilaterally  Gastrointestinal: Positive bowel sounds, soft, nontender, nondistended, completely benign  Musculoskeletal: No bilateral ankle edema, no clubbing or cyanosis to extremities  Psychiatric: Appropriate affect, cooperative  Neurologic: Oriented x 3, strength symmetric in all extremities, Cranial Nerves grossly intact to confrontation, speech clear  Skin: No rashes  Results Reviewed:  I have personally reviewed current lab, radiology, and data and agree.    Results from last 7 days   Lab Units 19  0240   WBC 10*3/mm3 12.00*   HEMOGLOBIN g/dL 14.3   HEMATOCRIT % 43.3   PLATELETS 10*3/mm3 263     Results from last 7 days   Lab Units 19  0240   SODIUM mmol/L 139   POTASSIUM mmol/L 3.5   CHLORIDE mmol/L 105   CO2 mmol/L " 26.0   BUN mg/dL 12   CREATININE mg/dL 1.29   GLUCOSE mg/dL 188*   CALCIUM mg/dL 9.9   ALT (SGPT) U/L 23   AST (SGOT) U/L 35*     Estimated Creatinine Clearance: 86.5 mL/min (by C-G formula based on SCr of 1.29 mg/dL).    No results found for: BNP    Microbiology Results Abnormal     None          Imaging Results (last 24 hours)     Procedure Component Value Units Date/Time    CT Abdomen Pelvis With Contrast [[id number]] Collected:  02/17/19 0305     Updated:  02/17/19 0436    Narrative:       EXAM:    CT Abdomen and Pelvis With Contrast     EXAM DATE/TIME:    2/17/2019 3:05 AM     CLINICAL HISTORY:    48 years old, male; Pain; Abdominal pain; Generalized; Additional info: Abd   pain and n/v     TECHNIQUE:    Axial computed tomography images of the abdomen and pelvis with intravenous   contrast.    All CT scans at this facility use at least one of these dose optimization   techniques: automated exposure control; mA and/or kV adjustment per patient   size (includes targeted exams where dose is matched to clinical indication); or   iterative reconstruction.    Coronal reformatted images were created and reviewed.     CONTRAST:    85 ml of isovue 300 administered intravenously.     COMPARISON:    No relevant prior studies available.     FINDINGS:    Lower thorax: No acute findings.     ABDOMEN:    Liver: Normal.     Gallbladder and bile ducts: Normal.     Pancreas: Normal.     Spleen: Normal.     Adrenals: Normal.     Kidneys and ureters:  Duplicated left renal collecting system, without acute   complications.    Stomach and bowel: Normal.    Appendix: No evidence of appendicitis.     PELVIS:    Bladder: Unremarkable as visualized.    Reproductive: Unremarkable as visualized.     ABDOMEN and PELVIS:    Intraperitoneal space: Normal. No free air. No significant fluid collection.    Bones/joints: No acute abnormality.    Soft tissues: Normal.    Vasculature: Normal. No abdominal aortic aneurysm.    Lymph nodes: Normal. No  enlarged lymph nodes.       Impression:       No acute abdominal or pelvic abnormality.    THIS DOCUMENT HAS BEEN ELECTRONICALLY SIGNED BY RICARDO NAVARRETE MD               I have reviewed the medications:    Current Facility-Administered Medications:   •  enoxaparin (LOVENOX) syringe 40 mg, 40 mg, Subcutaneous, Q24H, Maggie Reddy, APRN, 40 mg at 02/17/19 0926  •  hydrALAZINE (APRESOLINE) tablet 25 mg, 25 mg, Oral, BID, Maggie Reddy, APRN, 25 mg at 02/17/19 0926  •  HYDROmorphone (DILAUDID) injection 0.5 mg, 0.5 mg, Intravenous, Q4H PRN, Maggie Reddy, APRN, 0.5 mg at 02/17/19 0603  •  ondansetron (ZOFRAN) tablet 4 mg, 4 mg, Oral, Q6H PRN **OR** ondansetron (ZOFRAN) injection 4 mg, 4 mg, Intravenous, Q6H PRN, Maggie Reddy APRN  •  pantoprazole (PROTONIX) injection 40 mg, 40 mg, Intravenous, Q AM, Maggie Reddy, APRN, 40 mg at 02/17/19 0926  •  sodium chloride 0.9 % flush 10 mL, 10 mL, Intravenous, PRN, Jorge Christensen MD  •  sodium chloride 0.9 % flush 3 mL, 3 mL, Intravenous, Q12H, Maggie Reddy APRN, 3 mL at 02/17/19 0927  •  sodium chloride 0.9 % flush 3-10 mL, 3-10 mL, Intravenous, PRN, Maggie Reddy APRN  •  sodium chloride 0.9 % infusion, 125 mL/hr, Intravenous, Continuous, Maggie Reddy APRMARLYN, Last Rate: 125 mL/hr at 02/17/19 0554, 125 mL/hr at 02/17/19 0554      Assessment/Plan   Assessment / Plan     Active Hospital Problems    Diagnosis Date Noted   • **Idiopathic acute pancreatitis [K85.00] 02/17/2019   • Leukocytosis [D72.829] 02/17/2019   • Abdominal pain [R10.9] 02/17/2019   • Hyperglycemia [R73.9] 02/17/2019   • HTN (hypertension) [I10] 02/17/2019          Brief Hospital Course to date:  Primo Fernandez is a 48 y.o. male  ith two past eps of acute pancreatitis (etoh reslated), here with similar acute onset symptoms.  However, he has not been drinking etoh.  Lipase was only 250 after mult vomiting, and CT neg for peripanc stranding.     Assessment & Plan     Acute pancreatitis vs other  GI illness  -lipase 259  - clinically improved after fluids    HTN - says he is compliant withhis meds.     Hyperglycemia mild  -denies hx of diabetes  -hemoglobin A1C nl    Plan - try clears.  Then advance if evelin.  Then home today if possible.  Canceled GB US due to his clinical improvement.      DVT prophylaxis:  Lovenox, scds     CODE STATUS:        Code Status and Medical Interventions:   Ordered at: 02/17/19 0543     Code Status:     CPR     Medical Interventions (Level of Support Prior to Arrest):     Full        Electronically signed by Jalyn Rivas MD, 02/17/19, 9:40 AM.

## 2019-02-17 NOTE — PLAN OF CARE
Problem: Pancreatitis, Acute/Chronic (Adult)  Goal: Signs and Symptoms of Listed Potential Problems Will be Absent, Minimized or Managed (Pancreatitis, Acute/Chronic)  Outcome: Ongoing (interventions implemented as appropriate)   02/17/19 0580   Goal/Outcome Evaluation   Problems Assessed (Pancreatitis) all   Problems Present (Pancreatitis) diarrhea;fluid imbalance;nausea and vomiting;pain;situational response       Problem: Patient Care Overview  Goal: Plan of Care Review  Outcome: Ongoing (interventions implemented as appropriate)    Goal: Individualization and Mutuality  Outcome: Ongoing (interventions implemented as appropriate)    Goal: Discharge Needs Assessment  Outcome: Ongoing (interventions implemented as appropriate)    Goal: Interprofessional Rounds/Family Conf  Outcome: Ongoing (interventions implemented as appropriate)

## 2019-02-18 NOTE — DISCHARGE SUMMARY
"    [facility] Medicine Services  DISCHARGE SUMMARY    Patient Name: Primo Fernandez  : 1970  MRN: [id number]    Date of Admission: 2019  Date of Discharge:  2019  Primary Care Physician: Provider, No Known    Consults     No orders found for last 30 day(s).          Hospital Course     Presenting Problem:   Idiopathic acute pancreatitis, unspecified complication status [K85.00]    Active Hospital Problems    Diagnosis Date Noted   • **Idiopathic acute pancreatitis [K85.00] 2019   • Leukocytosis [D72.829] 2019   • Abdominal pain [R10.9] 2019   • Hyperglycemia [R73.9] 2019   • HTN (hypertension) [I10] 2019      Resolved Hospital Problems   No resolved problems to display.          Hospital Course:  Primo Fernandez is a 48 y.o. male with history of 2 past episodes of acute pancreatitis, attributed to etoh.  He has not been drinking lately.  On the night before this admission, he was at home eating a pizza when he suddenly developed epigastric pain and vomited repeatedly.  This felt like past episodes of pancreatitis.  He came to the ED, where lipase was 250 but CT showed a normal pancreas.      He was hydrated and received a single dose of IV analgesia.  He quickly improved to baseline.  Within hours, his abdomen was nontender and he was tolerating solid food.  Gallbladder US was considered but canceled at the patient's request, as he was very eager to just go home to [place].        Discharge Follow Up Recommendations for labs/diagnostics:   *consider GB imaging if similar attacks recur    Day of Discharge     HPI:   Feels fine.  \"When can I go home?\"    Review of Systems   Gen- No fevers, chills  CV- No chest pain, palpitations  Resp- No cough, dyspnea  GI- No N/V/D, abd pain    Otherwise ROS is negative except as mentioned in the HPI.    Vital Signs:         Physical Exam:  Gen:  WD/WN man, NAD, pleasant  Neuro: alert and oriented, clear " speech, follows commands, grossly nonfocal  HEENT:  NC/AT PERRL, OP benign  Neck:  Supple, no LAD  Heart RRR no murmur, rub, or gallop  Lungs CTA nonlabored  Abd:  Soft, nontender, no rebound or guarding, pos BS  Extrem:  No c/c/e      Pertinent  and/or Most Recent Results     Results from last 7 days   Lab Units 02/17/19  0240   WBC 10*3/mm3 12.00*   HEMOGLOBIN g/dL 14.3   HEMATOCRIT % 43.3   PLATELETS 10*3/mm3 263   SODIUM mmol/L 139   POTASSIUM mmol/L 3.5   CHLORIDE mmol/L 105   CO2 mmol/L 26.0   BUN mg/dL 12   CREATININE mg/dL 1.29   GLUCOSE mg/dL 188*   CALCIUM mg/dL 9.9     Results from last 7 days   Lab Units 02/17/19  0240   BILIRUBIN mg/dL 0.7   ALK PHOS U/L 66   ALT (SGPT) U/L 23   AST (SGOT) U/L 35*     Results from last 7 days   Lab Units 02/17/19  0240   CHOLESTEROL mg/dL 168   TRIGLYCERIDES mg/dL 235*   HDL CHOL mg/dL 29*     Results from last 7 days   Lab Units 02/17/19  0802   HEMOGLOBIN A1C % 5.60       Brief Urine Lab Results  (Last result in the past 365 days)      Color   Clarity   Blood   Leuk Est   Nitrite   Protein   CREAT   Urine HCG        02/17/19 0240 Yellow Clear Negative Negative Negative Negative               Microbiology Results Abnormal     None          Imaging Results (all)     Procedure Component Value Units Date/Time    CT Abdomen Pelvis With Contrast [[id number]] Collected:  02/17/19 0305     Updated:  02/17/19 0436    Narrative:       EXAM:    CT Abdomen and Pelvis With Contrast     EXAM DATE/TIME:    2/17/2019 3:05 AM     CLINICAL HISTORY:    48 years old, male; Pain; Abdominal pain; Generalized; Additional info: Abd   pain and n/v     TECHNIQUE:    Axial computed tomography images of the abdomen and pelvis with intravenous   contrast.    All CT scans at this facility use at least one of these dose optimization   techniques: automated exposure control; mA and/or kV adjustment per patient   size (includes targeted exams where dose is matched to clinical indication); or    iterative reconstruction.    Coronal reformatted images were created and reviewed.     CONTRAST:    85 ml of isovue 300 administered intravenously.     COMPARISON:    No relevant prior studies available.     FINDINGS:    Lower thorax: No acute findings.     ABDOMEN:    Liver: Normal.     Gallbladder and bile ducts: Normal.     Pancreas: Normal.     Spleen: Normal.     Adrenals: Normal.     Kidneys and ureters:  Duplicated left renal collecting system, without acute   complications.    Stomach and bowel: Normal.    Appendix: No evidence of appendicitis.     PELVIS:    Bladder: Unremarkable as visualized.    Reproductive: Unremarkable as visualized.     ABDOMEN and PELVIS:    Intraperitoneal space: Normal. No free air. No significant fluid collection.    Bones/joints: No acute abnormality.    Soft tissues: Normal.    Vasculature: Normal. No abdominal aortic aneurysm.    Lymph nodes: Normal. No enlarged lymph nodes.       Impression:       No acute abdominal or pelvic abnormality.    THIS DOCUMENT HAS BEEN ELECTRONICALLY SIGNED BY RICARDO NAVARRETE MD                           Discharge Details        Discharge Medications      Continue These Medications      Instructions Start Date   diltiaZEM  MG 24 hr capsule  Commonly known as:  CARDIZEM CD   240 mg, Oral, Daily      furosemide 20 MG tablet  Commonly known as:  LASIX   20 mg, Oral, Daily PRN      hydrALAZINE 25 MG tablet  Commonly known as:  APRESOLINE   25 mg, Oral, 2 Times Daily             No Known Allergies      Discharge Disposition:  Home or Self Care    Discharge Diet:  No active diet order        Discharge Activity:  routine      CODE STATUS:    Code Status and Medical Interventions:   Ordered at: 02/17/19 0543     Code Status:    CPR     Medical Interventions (Level of Support Prior to Arrest):    Full         No future appointments.  Keep any scheduled medical appointments.         Time Spent on Discharge:  30 minutes    Electronically signed by  Jalyn Rivas MD, 02/18/19, 3:53 PM.

## 2019-11-24 ENCOUNTER — HOSPITAL ENCOUNTER (EMERGENCY)
Age: 49
Discharge: HOME | End: 2019-11-24
Attending: EMERGENCY MEDICINE
Payer: MEDICAID

## 2019-11-24 ENCOUNTER — APPOINTMENT (OUTPATIENT)
Dept: CT IMAGING | Age: 49
End: 2019-11-24
Payer: MEDICAID

## 2019-11-24 VITALS
DIASTOLIC BLOOD PRESSURE: 102 MMHG | HEART RATE: 53 BPM | SYSTOLIC BLOOD PRESSURE: 182 MMHG | OXYGEN SATURATION: 100 % | RESPIRATION RATE: 18 BRPM | TEMPERATURE: 97.9 F

## 2019-11-24 DIAGNOSIS — R10.84 GENERALIZED ABDOMINAL PAIN: Primary | ICD-10-CM

## 2019-11-24 LAB
A/G RATIO: 1.1 (ref 1.1–2.2)
ALBUMIN SERPL-MCNC: 4.6 G/DL (ref 3.4–5)
ALP BLD-CCNC: 70 U/L (ref 40–129)
ALT SERPL-CCNC: 18 U/L (ref 10–40)
ANION GAP SERPL CALCULATED.3IONS-SCNC: 16 MMOL/L (ref 3–16)
AST SERPL-CCNC: 40 U/L (ref 15–37)
BASOPHILS ABSOLUTE: 0.1 K/UL (ref 0–0.2)
BASOPHILS RELATIVE PERCENT: 0.9 %
BILIRUB SERPL-MCNC: 0.4 MG/DL (ref 0–1)
BILIRUBIN URINE: NEGATIVE
BLOOD, URINE: NEGATIVE
BUN BLDV-MCNC: 14 MG/DL (ref 7–20)
CALCIUM SERPL-MCNC: 9.7 MG/DL (ref 8.3–10.6)
CHLORIDE BLD-SCNC: 101 MMOL/L (ref 99–110)
CLARITY: CLEAR
CO2: 23 MMOL/L (ref 21–32)
COLOR: YELLOW
CREAT SERPL-MCNC: 1.3 MG/DL (ref 0.9–1.3)
EKG ATRIAL RATE: 60 BPM
EKG DIAGNOSIS: NORMAL
EKG P AXIS: 48 DEGREES
EKG P-R INTERVAL: 136 MS
EKG Q-T INTERVAL: 506 MS
EKG QRS DURATION: 146 MS
EKG QTC CALCULATION (BAZETT): 506 MS
EKG R AXIS: -3 DEGREES
EKG T AXIS: 213 DEGREES
EKG VENTRICULAR RATE: 60 BPM
EOSINOPHILS ABSOLUTE: 0.1 K/UL (ref 0–0.6)
EOSINOPHILS RELATIVE PERCENT: 0.5 %
ETHANOL: NORMAL MG/DL (ref 0–0.08)
GFR AFRICAN AMERICAN: >60
GFR NON-AFRICAN AMERICAN: 59
GLOBULIN: 4.2 G/DL
GLUCOSE BLD-MCNC: 167 MG/DL (ref 70–99)
GLUCOSE URINE: 100 MG/DL
HCT VFR BLD CALC: 41.7 % (ref 40.5–52.5)
HEMOGLOBIN: 14.2 G/DL (ref 13.5–17.5)
KETONES, URINE: NEGATIVE MG/DL
LEUKOCYTE ESTERASE, URINE: NEGATIVE
LIPASE: 97 U/L (ref 13–60)
LYMPHOCYTES ABSOLUTE: 1.5 K/UL (ref 1–5.1)
LYMPHOCYTES RELATIVE PERCENT: 11.8 %
MCH RBC QN AUTO: 32.5 PG (ref 26–34)
MCHC RBC AUTO-ENTMCNC: 34.1 G/DL (ref 31–36)
MCV RBC AUTO: 95.4 FL (ref 80–100)
MICROSCOPIC EXAMINATION: ABNORMAL
MONOCYTES ABSOLUTE: 0.7 K/UL (ref 0–1.3)
MONOCYTES RELATIVE PERCENT: 5.7 %
NEUTROPHILS ABSOLUTE: 10.3 K/UL (ref 1.7–7.7)
NEUTROPHILS RELATIVE PERCENT: 81.1 %
NITRITE, URINE: NEGATIVE
PDW BLD-RTO: 13.1 % (ref 12.4–15.4)
PH UA: 8 (ref 5–8)
PLATELET # BLD: 242 K/UL (ref 135–450)
PMV BLD AUTO: 8.7 FL (ref 5–10.5)
POTASSIUM REFLEX MAGNESIUM: 4.8 MMOL/L (ref 3.5–5.1)
PROTEIN UA: NEGATIVE MG/DL
RBC # BLD: 4.36 M/UL (ref 4.2–5.9)
SODIUM BLD-SCNC: 140 MMOL/L (ref 136–145)
SPECIFIC GRAVITY UA: 1.01 (ref 1–1.03)
TOTAL PROTEIN: 8.8 G/DL (ref 6.4–8.2)
TROPONIN: 0.01 NG/ML
URINE REFLEX TO CULTURE: ABNORMAL
URINE TYPE: ABNORMAL
UROBILINOGEN, URINE: 0.2 E.U./DL
WBC # BLD: 12.6 K/UL (ref 4–11)

## 2019-11-24 PROCEDURE — 2580000003 HC RX 258: Performed by: EMERGENCY MEDICINE

## 2019-11-24 PROCEDURE — 96375 TX/PRO/DX INJ NEW DRUG ADDON: CPT

## 2019-11-24 PROCEDURE — 85025 COMPLETE CBC W/AUTO DIFF WBC: CPT

## 2019-11-24 PROCEDURE — 99284 EMERGENCY DEPT VISIT MOD MDM: CPT

## 2019-11-24 PROCEDURE — 2500000003 HC RX 250 WO HCPCS: Performed by: EMERGENCY MEDICINE

## 2019-11-24 PROCEDURE — 93005 ELECTROCARDIOGRAM TRACING: CPT | Performed by: EMERGENCY MEDICINE

## 2019-11-24 PROCEDURE — 6370000000 HC RX 637 (ALT 250 FOR IP): Performed by: EMERGENCY MEDICINE

## 2019-11-24 PROCEDURE — 84484 ASSAY OF TROPONIN QUANT: CPT

## 2019-11-24 PROCEDURE — 93010 ELECTROCARDIOGRAM REPORT: CPT | Performed by: INTERNAL MEDICINE

## 2019-11-24 PROCEDURE — 6360000002 HC RX W HCPCS: Performed by: EMERGENCY MEDICINE

## 2019-11-24 PROCEDURE — 96361 HYDRATE IV INFUSION ADD-ON: CPT

## 2019-11-24 PROCEDURE — 6360000004 HC RX CONTRAST MEDICATION: Performed by: EMERGENCY MEDICINE

## 2019-11-24 PROCEDURE — 83690 ASSAY OF LIPASE: CPT

## 2019-11-24 PROCEDURE — 74177 CT ABD & PELVIS W/CONTRAST: CPT

## 2019-11-24 PROCEDURE — 96374 THER/PROPH/DIAG INJ IV PUSH: CPT

## 2019-11-24 PROCEDURE — 81003 URINALYSIS AUTO W/O SCOPE: CPT

## 2019-11-24 PROCEDURE — G0480 DRUG TEST DEF 1-7 CLASSES: HCPCS

## 2019-11-24 PROCEDURE — 80053 COMPREHEN METABOLIC PANEL: CPT

## 2019-11-24 RX ORDER — DILTIAZEM HYDROCHLORIDE 240 MG/1
240 CAPSULE, COATED, EXTENDED RELEASE ORAL
COMMUNITY
Start: 2018-09-11

## 2019-11-24 RX ORDER — PROMETHAZINE HYDROCHLORIDE 12.5 MG/1
12.5 TABLET ORAL
COMMUNITY
Start: 2019-09-30

## 2019-11-24 RX ORDER — OXYCODONE HYDROCHLORIDE AND ACETAMINOPHEN 5; 325 MG/1; MG/1
1 TABLET ORAL ONCE
Status: COMPLETED | OUTPATIENT
Start: 2019-11-24 | End: 2019-11-24

## 2019-11-24 RX ORDER — PROMETHAZINE HYDROCHLORIDE 25 MG/1
25 TABLET ORAL
COMMUNITY
Start: 2019-08-22

## 2019-11-24 RX ORDER — PROMETHAZINE HYDROCHLORIDE 25 MG/1
25 SUPPOSITORY RECTAL
COMMUNITY
Start: 2019-08-22

## 2019-11-24 RX ORDER — ONDANSETRON 4 MG/1
4 TABLET, ORALLY DISINTEGRATING ORAL EVERY 8 HOURS PRN
Qty: 20 TABLET | Refills: 0 | Status: SHIPPED | OUTPATIENT
Start: 2019-11-24

## 2019-11-24 RX ORDER — FAMOTIDINE 20 MG/1
20 TABLET, FILM COATED ORAL 2 TIMES DAILY
Qty: 60 TABLET | Refills: 0 | Status: SHIPPED | OUTPATIENT
Start: 2019-11-24

## 2019-11-24 RX ORDER — ONDANSETRON 2 MG/ML
4 INJECTION INTRAMUSCULAR; INTRAVENOUS ONCE
Status: COMPLETED | OUTPATIENT
Start: 2019-11-24 | End: 2019-11-24

## 2019-11-24 RX ORDER — MAGNESIUM HYDROXIDE/ALUMINUM HYDROXICE/SIMETHICONE 120; 1200; 1200 MG/30ML; MG/30ML; MG/30ML
30 SUSPENSION ORAL ONCE
Status: COMPLETED | OUTPATIENT
Start: 2019-11-24 | End: 2019-11-24

## 2019-11-24 RX ORDER — HYDRALAZINE HYDROCHLORIDE 25 MG/1
25 TABLET, FILM COATED ORAL
COMMUNITY
Start: 2018-09-11

## 2019-11-24 RX ORDER — LOSARTAN POTASSIUM 100 MG/1
100 TABLET ORAL
COMMUNITY
Start: 2019-02-22

## 2019-11-24 RX ORDER — RANITIDINE 150 MG/1
150 TABLET ORAL
COMMUNITY
Start: 2019-09-30

## 2019-11-24 RX ORDER — MORPHINE SULFATE 4 MG/ML
4 INJECTION, SOLUTION INTRAMUSCULAR; INTRAVENOUS ONCE
Status: COMPLETED | OUTPATIENT
Start: 2019-11-24 | End: 2019-11-24

## 2019-11-24 RX ORDER — KETOROLAC TROMETHAMINE 30 MG/ML
30 INJECTION, SOLUTION INTRAMUSCULAR; INTRAVENOUS ONCE
Status: COMPLETED | OUTPATIENT
Start: 2019-11-24 | End: 2019-11-24

## 2019-11-24 RX ORDER — OXYCODONE HYDROCHLORIDE 5 MG/1
5 TABLET ORAL EVERY 6 HOURS PRN
Qty: 8 TABLET | Refills: 0 | Status: SHIPPED | OUTPATIENT
Start: 2019-11-24 | End: 2019-11-27

## 2019-11-24 RX ORDER — 0.9 % SODIUM CHLORIDE 0.9 %
1000 INTRAVENOUS SOLUTION INTRAVENOUS ONCE
Status: COMPLETED | OUTPATIENT
Start: 2019-11-24 | End: 2019-11-24

## 2019-11-24 RX ORDER — FAMOTIDINE 20 MG/1
20 TABLET, FILM COATED ORAL
COMMUNITY
Start: 2019-08-22

## 2019-11-24 RX ORDER — NIFEDIPINE 60 MG/1
60 TABLET, FILM COATED, EXTENDED RELEASE ORAL
COMMUNITY
Start: 2019-02-22

## 2019-11-24 RX ADMIN — ALUMINUM HYDROXIDE, MAGNESIUM HYDROXIDE, AND SIMETHICONE 30 ML: 200; 200; 20 SUSPENSION ORAL at 06:02

## 2019-11-24 RX ADMIN — MORPHINE SULFATE 4 MG: 4 INJECTION, SOLUTION INTRAMUSCULAR; INTRAVENOUS at 05:02

## 2019-11-24 RX ADMIN — KETOROLAC TROMETHAMINE 30 MG: 30 INJECTION, SOLUTION INTRAMUSCULAR at 05:31

## 2019-11-24 RX ADMIN — FAMOTIDINE 20 MG: 10 INJECTION, SOLUTION INTRAVENOUS at 06:50

## 2019-11-24 RX ADMIN — IOPAMIDOL 75 ML: 755 INJECTION, SOLUTION INTRAVENOUS at 06:33

## 2019-11-24 RX ADMIN — OXYCODONE HYDROCHLORIDE AND ACETAMINOPHEN 1 TABLET: 5; 325 TABLET ORAL at 06:01

## 2019-11-24 RX ADMIN — ONDANSETRON 4 MG: 2 INJECTION INTRAMUSCULAR; INTRAVENOUS at 05:02

## 2019-11-24 RX ADMIN — SODIUM CHLORIDE 1000 ML: 9 INJECTION, SOLUTION INTRAVENOUS at 05:04

## 2019-11-24 RX ADMIN — HYDROMORPHONE HYDROCHLORIDE 0.5 MG: 1 INJECTION, SOLUTION INTRAMUSCULAR; INTRAVENOUS; SUBCUTANEOUS at 06:51

## 2019-11-24 ASSESSMENT — PAIN DESCRIPTION - PROGRESSION
CLINICAL_PROGRESSION: NOT CHANGED
CLINICAL_PROGRESSION: GRADUALLY WORSENING

## 2019-11-24 ASSESSMENT — PAIN SCALES - GENERAL
PAINLEVEL_OUTOF10: 3
PAINLEVEL_OUTOF10: 10
PAINLEVEL_OUTOF10: 10
PAINLEVEL_OUTOF10: 9
PAINLEVEL_OUTOF10: 3

## 2019-11-24 ASSESSMENT — PAIN DESCRIPTION - PAIN TYPE
TYPE: ACUTE PAIN

## 2019-11-24 ASSESSMENT — PAIN DESCRIPTION - ONSET: ONSET: ON-GOING

## 2019-11-24 ASSESSMENT — PAIN DESCRIPTION - DESCRIPTORS
DESCRIPTORS: ACHING
DESCRIPTORS: ACHING
DESCRIPTORS: SHARP;DISCOMFORT
DESCRIPTORS: THROBBING

## 2019-11-24 ASSESSMENT — PAIN DESCRIPTION - FREQUENCY
FREQUENCY: CONTINUOUS
FREQUENCY: INTERMITTENT
FREQUENCY: CONTINUOUS
FREQUENCY: CONTINUOUS

## 2019-11-24 ASSESSMENT — PAIN DESCRIPTION - LOCATION
LOCATION: ABDOMEN

## 2019-11-24 ASSESSMENT — PAIN - FUNCTIONAL ASSESSMENT
PAIN_FUNCTIONAL_ASSESSMENT: PREVENTS OR INTERFERES SOME ACTIVE ACTIVITIES AND ADLS
PAIN_FUNCTIONAL_ASSESSMENT: ACTIVITIES ARE NOT PREVENTED
PAIN_FUNCTIONAL_ASSESSMENT: PREVENTS OR INTERFERES SOME ACTIVE ACTIVITIES AND ADLS

## 2019-11-25 ASSESSMENT — ENCOUNTER SYMPTOMS
VOMITING: 1
SORE THROAT: 0
RHINORRHEA: 0
NAUSEA: 1
DIARRHEA: 1
SHORTNESS OF BREATH: 0
CHEST TIGHTNESS: 0
ABDOMINAL PAIN: 1

## 2023-04-14 ENCOUNTER — APPOINTMENT (OUTPATIENT)
Dept: CT IMAGING | Age: 53
End: 2023-04-14
Payer: COMMERCIAL

## 2023-04-14 ENCOUNTER — HOSPITAL ENCOUNTER (EMERGENCY)
Age: 53
Discharge: HOME | End: 2023-04-14
Attending: EMERGENCY MEDICINE
Payer: COMMERCIAL

## 2023-04-14 VITALS
TEMPERATURE: 96.8 F | HEIGHT: 72 IN | RESPIRATION RATE: 18 BRPM | OXYGEN SATURATION: 100 % | WEIGHT: 205 LBS | SYSTOLIC BLOOD PRESSURE: 166 MMHG | BODY MASS INDEX: 27.77 KG/M2 | HEART RATE: 63 BPM | DIASTOLIC BLOOD PRESSURE: 100 MMHG

## 2023-04-14 DIAGNOSIS — R10.9 ABDOMINAL PAIN, UNSPECIFIED ABDOMINAL LOCATION: ICD-10-CM

## 2023-04-14 DIAGNOSIS — R10.13 ABDOMINAL PAIN, EPIGASTRIC: Primary | ICD-10-CM

## 2023-04-14 LAB
ALBUMIN SERPL-MCNC: 5.1 G/DL (ref 3.4–5)
ALP SERPL-CCNC: 101 U/L (ref 40–129)
ALT SERPL-CCNC: 18 U/L (ref 10–40)
ANION GAP SERPL CALCULATED.3IONS-SCNC: 14 MMOL/L (ref 3–16)
AST SERPL-CCNC: 31 U/L (ref 15–37)
BASOPHILS # BLD: 0.1 K/UL (ref 0–0.2)
BASOPHILS NFR BLD: 0.5 %
BILIRUB DIRECT SERPL-MCNC: <0.2 MG/DL (ref 0–0.3)
BILIRUB INDIRECT SERPL-MCNC: ABNORMAL MG/DL (ref 0–1)
BILIRUB SERPL-MCNC: 0.8 MG/DL (ref 0–1)
BILIRUB UR QL STRIP.AUTO: NEGATIVE
BUN SERPL-MCNC: 14 MG/DL (ref 7–20)
CALCIUM SERPL-MCNC: 10.4 MG/DL (ref 8.3–10.6)
CHLORIDE SERPL-SCNC: 104 MMOL/L (ref 99–110)
CLARITY UR: CLEAR
CO2 SERPL-SCNC: 24 MMOL/L (ref 21–32)
COLOR UR: YELLOW
CREAT SERPL-MCNC: 1.3 MG/DL (ref 0.9–1.3)
DEPRECATED RDW RBC AUTO: 12.8 % (ref 12.4–15.4)
EKG ATRIAL RATE: 66 BPM
EKG DIAGNOSIS: NORMAL
EKG P AXIS: 65 DEGREES
EKG P-R INTERVAL: 146 MS
EKG Q-T INTERVAL: 472 MS
EKG QRS DURATION: 152 MS
EKG QTC CALCULATION (BAZETT): 430 MS
EKG R AXIS: 14 DEGREES
EKG T AXIS: 14 DEGREES
EKG VENTRICULAR RATE: 50 BPM
EOSINOPHIL # BLD: 0 K/UL (ref 0–0.6)
EOSINOPHIL NFR BLD: 0.2 %
GFR SERPLBLD CREATININE-BSD FMLA CKD-EPI: >60 ML/MIN/{1.73_M2}
GLUCOSE SERPL-MCNC: 178 MG/DL (ref 70–99)
GLUCOSE UR STRIP.AUTO-MCNC: 100 MG/DL
HCT VFR BLD AUTO: 43 % (ref 40.5–52.5)
HGB BLD-MCNC: 14.9 G/DL (ref 13.5–17.5)
HGB UR QL STRIP.AUTO: NEGATIVE
HYALINE CASTS #/AREA URNS LPF: ABNORMAL /LPF (ref 0–2)
KETONES UR STRIP.AUTO-MCNC: NEGATIVE MG/DL
LACTATE BLDV-SCNC: 2.5 MMOL/L (ref 0.4–2)
LACTATE BLDV-SCNC: 2.5 MMOL/L (ref 0.4–2)
LEUKOCYTE ESTERASE UR QL STRIP.AUTO: NEGATIVE
LIPASE SERPL-CCNC: 23 U/L (ref 13–60)
LYMPHOCYTES # BLD: 1.3 K/UL (ref 1–5.1)
LYMPHOCYTES NFR BLD: 10.9 %
MCH RBC QN AUTO: 33.5 PG (ref 26–34)
MCHC RBC AUTO-ENTMCNC: 34.7 G/DL (ref 31–36)
MCV RBC AUTO: 96.5 FL (ref 80–100)
MONOCYTES # BLD: 0.3 K/UL (ref 0–1.3)
MONOCYTES NFR BLD: 2.8 %
NEUTROPHILS # BLD: 10.4 K/UL (ref 1.7–7.7)
NEUTROPHILS NFR BLD: 85.6 %
NITRITE UR QL STRIP.AUTO: NEGATIVE
NT-PROBNP SERPL-MCNC: 65 PG/ML (ref 0–124)
PH UR STRIP.AUTO: 7.5 [PH] (ref 5–8)
PLATELET # BLD AUTO: 250 K/UL (ref 135–450)
PMV BLD AUTO: 9 FL (ref 5–10.5)
POTASSIUM SERPL-SCNC: 3.8 MMOL/L (ref 3.5–5.1)
PROT SERPL-MCNC: 9.5 G/DL (ref 6.4–8.2)
PROT UR STRIP.AUTO-MCNC: NEGATIVE MG/DL
RBC # BLD AUTO: 4.45 M/UL (ref 4.2–5.9)
RBC #/AREA URNS HPF: ABNORMAL /HPF (ref 0–4)
SODIUM SERPL-SCNC: 142 MMOL/L (ref 136–145)
SP GR UR STRIP.AUTO: 1.02 (ref 1–1.03)
TROPONIN T SERPL-MCNC: <0.01 NG/ML
UA DIPSTICK W REFLEX MICRO PNL UR: ABNORMAL
URN SPEC COLLECT METH UR: ABNORMAL
UROBILINOGEN UR STRIP-ACNC: 0.2 E.U./DL
WBC # BLD AUTO: 12.2 K/UL (ref 4–11)
WBC #/AREA URNS HPF: ABNORMAL /HPF (ref 0–5)

## 2023-04-14 PROCEDURE — 6370000000 HC RX 637 (ALT 250 FOR IP): Performed by: PHYSICIAN ASSISTANT

## 2023-04-14 PROCEDURE — 81001 URINALYSIS AUTO W/SCOPE: CPT

## 2023-04-14 PROCEDURE — 6360000002 HC RX W HCPCS: Performed by: PHYSICIAN ASSISTANT

## 2023-04-14 PROCEDURE — 83605 ASSAY OF LACTIC ACID: CPT

## 2023-04-14 PROCEDURE — 93005 ELECTROCARDIOGRAM TRACING: CPT | Performed by: PHYSICIAN ASSISTANT

## 2023-04-14 PROCEDURE — 71250 CT THORAX DX C-: CPT

## 2023-04-14 PROCEDURE — 99284 EMERGENCY DEPT VISIT MOD MDM: CPT

## 2023-04-14 PROCEDURE — 83690 ASSAY OF LIPASE: CPT

## 2023-04-14 PROCEDURE — 2500000003 HC RX 250 WO HCPCS: Performed by: PHYSICIAN ASSISTANT

## 2023-04-14 PROCEDURE — 96372 THER/PROPH/DIAG INJ SC/IM: CPT

## 2023-04-14 PROCEDURE — 83880 ASSAY OF NATRIURETIC PEPTIDE: CPT

## 2023-04-14 PROCEDURE — 85025 COMPLETE CBC W/AUTO DIFF WBC: CPT

## 2023-04-14 PROCEDURE — 2580000003 HC RX 258: Performed by: PHYSICIAN ASSISTANT

## 2023-04-14 PROCEDURE — 84484 ASSAY OF TROPONIN QUANT: CPT

## 2023-04-14 PROCEDURE — 80048 BASIC METABOLIC PNL TOTAL CA: CPT

## 2023-04-14 PROCEDURE — 36415 COLL VENOUS BLD VENIPUNCTURE: CPT

## 2023-04-14 PROCEDURE — 80076 HEPATIC FUNCTION PANEL: CPT

## 2023-04-14 RX ORDER — DROPERIDOL 2.5 MG/ML
1.25 INJECTION, SOLUTION INTRAMUSCULAR; INTRAVENOUS ONCE
Status: DISCONTINUED | OUTPATIENT
Start: 2023-04-14 | End: 2023-04-14 | Stop reason: HOSPADM

## 2023-04-14 RX ORDER — LABETALOL HYDROCHLORIDE 5 MG/ML
10 INJECTION, SOLUTION INTRAVENOUS ONCE
Status: DISCONTINUED | OUTPATIENT
Start: 2023-04-14 | End: 2023-04-14

## 2023-04-14 RX ORDER — 0.9 % SODIUM CHLORIDE 0.9 %
1000 INTRAVENOUS SOLUTION INTRAVENOUS ONCE
Status: DISCONTINUED | OUTPATIENT
Start: 2023-04-14 | End: 2023-04-14 | Stop reason: HOSPADM

## 2023-04-14 RX ORDER — DIPHENHYDRAMINE HCL 25 MG
25 TABLET ORAL ONCE
Status: COMPLETED | OUTPATIENT
Start: 2023-04-14 | End: 2023-04-14

## 2023-04-14 RX ADMIN — HYDROMORPHONE HYDROCHLORIDE 1 MG: 1 INJECTION, SOLUTION INTRAMUSCULAR; INTRAVENOUS; SUBCUTANEOUS at 14:03

## 2023-04-14 RX ADMIN — DIPHENHYDRAMINE HYDROCHLORIDE 25 MG: 25 TABLET ORAL at 17:12

## 2023-04-14 ASSESSMENT — PAIN DESCRIPTION - LOCATION
LOCATION: ABDOMEN
LOCATION: ABDOMEN

## 2023-04-14 ASSESSMENT — ENCOUNTER SYMPTOMS
BACK PAIN: 0
ABDOMINAL PAIN: 1
NAUSEA: 1
CHEST TIGHTNESS: 0
VOMITING: 1
SHORTNESS OF BREATH: 0

## 2023-04-14 ASSESSMENT — PAIN SCALES - GENERAL
PAINLEVEL_OUTOF10: 10
PAINLEVEL_OUTOF10: 9
PAINLEVEL_OUTOF10: 6

## 2023-04-14 ASSESSMENT — PAIN DESCRIPTION - DESCRIPTORS: DESCRIPTORS: SHARP

## 2023-04-14 ASSESSMENT — PAIN DESCRIPTION - PAIN TYPE: TYPE: ACUTE PAIN

## 2023-04-14 ASSESSMENT — PAIN DESCRIPTION - ORIENTATION: ORIENTATION: MID

## 2023-04-14 ASSESSMENT — LIFESTYLE VARIABLES
HOW MANY STANDARD DRINKS CONTAINING ALCOHOL DO YOU HAVE ON A TYPICAL DAY: 1 OR 2
HOW OFTEN DO YOU HAVE A DRINK CONTAINING ALCOHOL: MONTHLY OR LESS

## 2023-04-14 ASSESSMENT — PAIN DESCRIPTION - FREQUENCY: FREQUENCY: CONTINUOUS

## 2023-04-14 ASSESSMENT — PAIN - FUNCTIONAL ASSESSMENT: PAIN_FUNCTIONAL_ASSESSMENT: 0-10

## 2023-04-14 ASSESSMENT — PAIN DESCRIPTION - ONSET: ONSET: PROGRESSIVE

## 2023-04-14 NOTE — DISCHARGE INSTRUCTIONS
You were seen in the emergency department for abdominal pain    Our testing today did not reveal a definite cause for you pain. Avoid spicy food, alcohol, and coffee as these may worsen your abdominal pain. Drink plenty of fluids, at least 6-8 glasses of water per day. Be sure to eat plenty of fiber. Follow-up with a primary care doctor within 1 week should you continue to experience your symptoms. SEEK IMMEDIATE MEDICAL CARE IF:   Your pain is not gone in 24 hours. Your pain becomes worse, changes location, or feels different. You have an oral temperature above 102° F (38.9° C), not controlled by medicine. You have shaking chills. You keep throwing up (vomiting) or cannot drink liquids. There is blood in your vomit or you see blood in your bowel movements. Your bowel movements become dark or black. You have frequent bowel movements. Your bowel movements stop (become blocked) or you cannot pass gas. You have bloody, frequent, or painful urination. You have yellow discoloration in the skin or whites of the eyes. Your stomach becomes bloated or bigger. You have dizziness or fainting. You have chest or back pain.   Or you have any other concerns

## 2023-04-14 NOTE — PROCEDURES
Procedures     US IV placed in the R basilic. 20 G draws blood, flushes easily, cleaned with alcohol prep pad.   Done at 1413 on 4/14/2023

## 2023-04-14 NOTE — ED PROVIDER NOTES
[address] ENCOUNTER          NURSE PRACTITIONER RE-ASSESSMENT NOTE     Pertinent Patient History     I assumed the care of this patient from the outgoing provider, Kalyan Varner PA-c. Please see their note for further details of history, physical exam, evaluation, and management thus far. Briefly, Parminder Cotton is a 50 y.o. male who presented with epigastric abdominal pain, nausea/vomiting. Couldn't take BP meds this AM d/t N/V. Hx pancreatitis. Initially diaphoretic, hypertensive. Was some concern for aortic pathology. Tenuous IV access including attempt at [address] IV. Prior team opted for CT C/A/P w/o contrast to evaluate for pathology including presence of fluid in the abdomen. This was negative. Labs reassuring    At the time of signout, the following was pending:  Symptom control then home    New Data/MDM     Patient feeling better. Declined droperidol - but took the benadryl that was ordered with it? Asking to go home. Repeat abd remains benign. Labs reassing, except initial lactate of 2.5 which was stable on recheck. Don't suspect sepsis. Discharged with close return precautions.     Disposition     discharged from the ED, see AVS for prescriptions, follow up, and discharge instructions       RAIN Osorio - CNP, CNP  Department of Emergency Medicine     RAIN Osorio - [facility]  04/14/23 4777
ED Attending Attestation Note     Date of evaluation: 4/14/2023    This patient was seen by the advance practice provider. I have seen and examined the patient, agree with the workup, evaluation, management and diagnosis. The care plan has been discussed. I have reviewed the ECG and concur with the MARY ELLEN's interpretation. My assessment reveals patient comes in with epigastric Carl pain. Remote history of pancreatitis he said he had for like this in the past.  My exam he is diaphoretic and clammy. In pain. Point to his epigastrium with some pain palpation in that area. Present for line placement by resident. Patient was brought to CT scan to look at aorta and other pathology that could be causing a significant pain which is arriving. Ulises Bustamante MD  04/14/23 3646
Palpations: Abdomen is soft. Tenderness: There is abdominal tenderness (Mild periumbilical and epigastric abdominal tenderness). There is no right CVA tenderness, left CVA tenderness, guarding or rebound. Negative signs include Arana's sign. Hernia: No hernia is present. Skin:     General: Skin is warm.    Psychiatric:         Mood and Affect: Mood normal.         Behavior: Behavior normal.           Jeremie Roberts PA-C  04/14/23 1730

## 2023-09-19 ENCOUNTER — HOSPITAL ENCOUNTER (INPATIENT)
Age: 53
DRG: 057 | End: 2023-09-19
Attending: PHYSICAL MEDICINE & REHABILITATION | Admitting: PHYSICAL MEDICINE & REHABILITATION
Payer: COMMERCIAL

## 2023-09-19 DIAGNOSIS — I62.9 NONTRAUMATIC INTRACRANIAL HEMORRHAGE (HCC): Primary | ICD-10-CM

## 2023-09-19 DIAGNOSIS — F41.9 ANXIETY DISORDER, UNSPECIFIED TYPE: ICD-10-CM

## 2023-09-19 LAB
GLUCOSE BLD-MCNC: 115 MG/DL (ref 70–99)
PERFORMED ON: ABNORMAL

## 2023-09-19 PROCEDURE — 1280000000 HC REHAB R&B

## 2023-09-19 PROCEDURE — 6370000000 HC RX 637 (ALT 250 FOR IP): Performed by: PHYSICAL MEDICINE & REHABILITATION

## 2023-09-19 RX ORDER — FAMOTIDINE 20 MG/1
20 TABLET, FILM COATED ORAL 2 TIMES DAILY
Status: DISCONTINUED | OUTPATIENT
Start: 2023-09-19 | End: 2023-10-12 | Stop reason: HOSPADM

## 2023-09-19 RX ORDER — CARVEDILOL 25 MG/1
25 TABLET ORAL 2 TIMES DAILY WITH MEALS
Status: DISCONTINUED | OUTPATIENT
Start: 2023-09-19 | End: 2023-09-29

## 2023-09-19 RX ORDER — HYDRALAZINE HYDROCHLORIDE 10 MG/1
10 TABLET, FILM COATED ORAL EVERY 6 HOURS PRN
Status: DISCONTINUED | OUTPATIENT
Start: 2023-09-19 | End: 2023-10-10

## 2023-09-19 RX ORDER — HYDRALAZINE HYDROCHLORIDE 50 MG/1
100 TABLET, FILM COATED ORAL EVERY 8 HOURS SCHEDULED
Status: DISCONTINUED | OUTPATIENT
Start: 2023-09-19 | End: 2023-10-05

## 2023-09-19 RX ORDER — ACETAMINOPHEN 325 MG/1
650 TABLET ORAL EVERY 6 HOURS PRN
Status: DISCONTINUED | OUTPATIENT
Start: 2023-09-19 | End: 2023-10-12 | Stop reason: HOSPADM

## 2023-09-19 RX ORDER — BISACODYL 10 MG
10 SUPPOSITORY, RECTAL RECTAL DAILY PRN
Status: DISCONTINUED | OUTPATIENT
Start: 2023-09-19 | End: 2023-10-12 | Stop reason: HOSPADM

## 2023-09-19 RX ORDER — AMLODIPINE BESYLATE 10 MG/1
10 TABLET ORAL DAILY
Status: DISCONTINUED | OUTPATIENT
Start: 2023-09-20 | End: 2023-10-12 | Stop reason: HOSPADM

## 2023-09-19 RX ORDER — LANOLIN ALCOHOL/MO/W.PET/CERES
3 CREAM (GRAM) TOPICAL NIGHTLY PRN
Status: DISCONTINUED | OUTPATIENT
Start: 2023-09-19 | End: 2023-10-02

## 2023-09-19 RX ORDER — VALSARTAN 160 MG/1
160 TABLET ORAL DAILY
Status: DISCONTINUED | OUTPATIENT
Start: 2023-09-20 | End: 2023-10-12 | Stop reason: HOSPADM

## 2023-09-19 RX ORDER — LEVETIRACETAM 500 MG/1
1000 TABLET ORAL 2 TIMES DAILY
Status: COMPLETED | OUTPATIENT
Start: 2023-09-19 | End: 2023-09-22

## 2023-09-19 RX ORDER — POLYETHYLENE GLYCOL 3350 17 G/17G
17 POWDER, FOR SOLUTION ORAL DAILY PRN
Status: DISCONTINUED | OUTPATIENT
Start: 2023-09-19 | End: 2023-10-12 | Stop reason: HOSPADM

## 2023-09-19 RX ORDER — OXYCODONE HYDROCHLORIDE AND ACETAMINOPHEN 5; 325 MG/1; MG/1
1 TABLET ORAL EVERY 4 HOURS PRN
Status: DISCONTINUED | OUTPATIENT
Start: 2023-09-19 | End: 2023-09-21

## 2023-09-19 RX ORDER — ATORVASTATIN CALCIUM 40 MG/1
40 TABLET, FILM COATED ORAL DAILY
Status: DISCONTINUED | OUTPATIENT
Start: 2023-09-20 | End: 2023-10-12 | Stop reason: HOSPADM

## 2023-09-19 RX ORDER — SENNA AND DOCUSATE SODIUM 50; 8.6 MG/1; MG/1
1 TABLET, FILM COATED ORAL 2 TIMES DAILY
Status: DISCONTINUED | OUTPATIENT
Start: 2023-09-19 | End: 2023-10-12 | Stop reason: HOSPADM

## 2023-09-19 RX ORDER — CLONIDINE HYDROCHLORIDE 0.1 MG/1
0.3 TABLET ORAL 3 TIMES DAILY
Status: DISCONTINUED | OUTPATIENT
Start: 2023-09-19 | End: 2023-10-10

## 2023-09-19 RX ORDER — ONDANSETRON 4 MG/1
4 TABLET, FILM COATED ORAL EVERY 8 HOURS PRN
Status: DISCONTINUED | OUTPATIENT
Start: 2023-09-19 | End: 2023-10-12 | Stop reason: HOSPADM

## 2023-09-19 RX ORDER — TIZANIDINE 4 MG/1
2 TABLET ORAL EVERY 6 HOURS PRN
Status: DISCONTINUED | OUTPATIENT
Start: 2023-09-19 | End: 2023-10-12 | Stop reason: HOSPADM

## 2023-09-19 RX ADMIN — OXYCODONE AND ACETAMINOPHEN 1 TABLET: 5; 325 TABLET ORAL at 21:31

## 2023-09-19 RX ADMIN — CARVEDILOL 25 MG: 25 TABLET, FILM COATED ORAL at 18:56

## 2023-09-19 RX ADMIN — CLONIDINE HYDROCHLORIDE 0.3 MG: 0.1 TABLET ORAL at 21:37

## 2023-09-19 RX ADMIN — FAMOTIDINE 20 MG: 20 TABLET ORAL at 21:38

## 2023-09-19 RX ADMIN — HYDRALAZINE HYDROCHLORIDE 100 MG: 50 TABLET, FILM COATED ORAL at 21:37

## 2023-09-19 RX ADMIN — LEVETIRACETAM 1000 MG: 500 TABLET, FILM COATED ORAL at 21:37

## 2023-09-19 RX ADMIN — ACETAMINOPHEN 650 MG: 325 TABLET ORAL at 19:33

## 2023-09-19 RX ADMIN — SENNOSIDES AND DOCUSATE SODIUM 1 TABLET: 50; 8.6 TABLET ORAL at 21:38

## 2023-09-19 RX ADMIN — TIZANIDINE 2 MG: 4 TABLET ORAL at 19:33

## 2023-09-19 ASSESSMENT — PAIN DESCRIPTION - LOCATION
LOCATION: HEAD

## 2023-09-19 ASSESSMENT — PAIN DESCRIPTION - FREQUENCY
FREQUENCY: INTERMITTENT
FREQUENCY: INTERMITTENT
FREQUENCY: CONTINUOUS

## 2023-09-19 ASSESSMENT — PAIN SCALES - GENERAL
PAINLEVEL_OUTOF10: 8
PAINLEVEL_OUTOF10: 10
PAINLEVEL_OUTOF10: 10
PAINLEVEL_OUTOF10: 9

## 2023-09-19 ASSESSMENT — PAIN DESCRIPTION - PAIN TYPE
TYPE: ACUTE PAIN

## 2023-09-19 ASSESSMENT — PAIN DESCRIPTION - DESCRIPTORS
DESCRIPTORS: PRESSURE;SHARP
DESCRIPTORS: PRESSURE;SHARP
DESCRIPTORS: ACHING;THROBBING

## 2023-09-19 ASSESSMENT — PAIN DESCRIPTION - ORIENTATION
ORIENTATION: RIGHT
ORIENTATION: RIGHT;OTHER (COMMENT)

## 2023-09-19 ASSESSMENT — PAIN DESCRIPTION - ONSET
ONSET: ON-GOING

## 2023-09-19 ASSESSMENT — PAIN - FUNCTIONAL ASSESSMENT
PAIN_FUNCTIONAL_ASSESSMENT: ACTIVITIES ARE NOT PREVENTED
PAIN_FUNCTIONAL_ASSESSMENT: PREVENTS OR INTERFERES SOME ACTIVE ACTIVITIES AND ADLS

## 2023-09-19 NOTE — H&P
Department of Physical Medicine & Rehabilitation  History & Physical      Patient Identification:  Nabila Valenzuela  : 1970  Admit date: 2023   Attending provider: Chanel Weldon MD        Primary care provider: No primary care provider on file. Chief Complaint: left side weakness    History of Present Illness/Hospital Course:  Patient is a 51 yo M with pmh NSTEMI () RBBB, HTN, HLD, CKD III, and chronic pancreatitis who initially presented to  on 2023 with acute onset left side weakness. He had been complaining of headache throughout the day prior to this. CT head revealed acute basal ganglia and right frontal lobe intraparenchymal hemorrhage w/ significant mass effect on the lateral ventricles R>L and leftward midline shift. CTA negative for vascular malformation. Systolic blood pressure was reportedly in the 250s on arrival. He received 500ml hypertonic saline, Keppra load, and was started on nicardipine gtt prior to being admitted to NSICU. He was started on PO HTN meds and weaned off nicardipine gttt on . Repeat [facility]  was stable. Course was complicated by dysphagia, difficult to control HTN. Now presents to ARU with impaired mobility, self-care, and cognition below his baseline. Currently, patient reports intermittent headache. He has severe left side weakness and decreased sensation left side. He has slurred speech. He has intermittent blurred vision.  He is motivated to start inpatient rehabilitation program.      Prior Level of Function:  Independent for mobility, ADLs, and IADLs  Working full time as [employer]    Current Level of Function:  Mod-maxA x 2 person for mobility  Up to total assist for ADLs    Pertinent Social History:  Support: lives with niece  Home set-up: multi level home with [facility], 1 step to enter    Past Medical History:   Diagnosis Date    Hypertension     Pancreatitis        Past Surgical History:   Procedure Laterality Date    APPENDECTOMY         No

## 2023-09-19 NOTE — PROGRESS NOTES
ADMISSION ORIENTATION    [address] [facility] RECORD NUMBER:  [id number]  AGE: 50 y.o. GENDER: male  : 1970  TODAYS DATE:  2023      Room Orientation     Patient admitted to room 3270 per [] Wheel Chair  [] Bed  [] Recliner  [x] Stretcher  [] Other:      Transferred to:  [x] Bed  [] Recliner  [] Other: .      Patient Required moderate assistance with None    Patient was oriented to:  [x] Call Light  [x] Room Phone  [x] TV  [x] Thermostat  [x] Bathroom  [x] Bed and Chair Alarms per Rehab Policy  [x] Closet  [x] White Board and it's Use on Rehab  [] Other:    Patient Verbalized Understanding: Yes  Family Present: Yes      Rehab Orientation     [x] 3 Hours of Therapy  through   [] Focus and Specialty of Physical, Occupational, and Speech and Language Pathology  [] Weekly Team Conference and Discharge Planning  [] Roles of the Rehab Doctor, Consulting Doctors, Nursing, Dietary, and Social Work  [] Everyday Clothing, including proper footwear, for Therapy  [x] Visiting Hours, Visitor Ages, and Visitors Sleeping Overnight in the Hospital  [] Visitor Participation in Therapy  [x]  and Sundays on Rehab  [x] Introduction of Welcome Folder, including Rehab Expectations, Nurse Manager's Welcome Letter, Visitor's Guide, and Menu Service  [x] Planning Ahead and Ordering Meals  [] Falls Contract [] Signed, [] Copied, and [] Taped to [employer]  [] Other:    Patient Verbalized Understanding: Yes  Family Present: Yes    Electronically signed by Gary Obando RN on 2023 at 6:13 PM

## 2023-09-19 NOTE — PROGRESS NOTES
Patient admitted to room 3270 per Stretcher. Transferred to Bed with [facility]. Patient was oriented to the Call Light, Phone, TV, Thermostat, Bed Controls, Bathroom and Emergency Cord. Patient verbalized and demonstrated understanding of all. Patient was also given an over view of Unit Routines for Acute Rehab, including what to wear for therapy. The patient's role in goal setting was reviewed along with an explanation of the Interdisciplinary Team meeting, the 's role in coordinating services and the Discharge Planning/Continuum of Care process. Patient Rights and Responsibilities were reviewed. Meal times were explained, including how to order food. The white board (used for communication) was pointed out emphasizing  the 3 hours/day Therapy Schedule (posted most evenings), the number (and process) for reporting grievances, and the Doctor's, Nurse's, and PCA's names. It was recommended that any family that will be care givers or any care givers the patient has, take part in therapy. There are no set visiting hours, and it was suggested that non-caregiver friends and family visitors come after therapy (at 4 PM or later) to allow patient to rest in between sessions.

## 2023-09-19 NOTE — PROGRESS NOTES
4 Eyes Skin Assessment     NAME:  Zaid Shah OF BIRTH:  1970  MEDICAL RECORD NUMBER:  [id number]    The patient is being assessed for  Admission    I agree that at least one RN has performed a thorough Head to Toe Skin Assessment on the patient. ALL assessment sites listed below have been assessed. Areas assessed by both nurses:    Head, Face, Ears, Shoulders, Back, Chest, Arms, Elbows, Hands, Sacrum. Buttock, Coccyx, Ischium, Legs. Feet and Heels, and Under Medical Devices         Does the Patient have a Wound?  No noted wound(s)       Alonso Prevention initiated by RN: Yes  Wound Care Orders initiated by RN: No    Pressure Injury (Stage 3,4, Unstageable, DTI, NWPT, and Complex wounds) if present, place Wound referral order by RN under : No    New Ostomies, if present place, Ostomy referral order under : No     Nurse 1 eSignature: Electronically signed by Emy Ashford RN on 9/19/23 at 5:45 PM EDT    **SHARE this note so that the co-signing nurse can place an eSignature**    Nurse 2 eSignature: Electronically signed by Merlin Diallo RN on 9/19/23 at 6:10 PM EDT

## 2023-09-20 LAB
ANION GAP SERPL CALCULATED.3IONS-SCNC: 9 MMOL/L (ref 3–16)
BASOPHILS # BLD: 0.1 K/UL (ref 0–0.2)
BASOPHILS NFR BLD: 0.8 %
BUN SERPL-MCNC: 26 MG/DL (ref 7–20)
CALCIUM SERPL-MCNC: 8.7 MG/DL (ref 8.3–10.6)
CHLORIDE SERPL-SCNC: 104 MMOL/L (ref 99–110)
CO2 SERPL-SCNC: 23 MMOL/L (ref 21–32)
CREAT SERPL-MCNC: 1.4 MG/DL (ref 0.9–1.3)
DEPRECATED RDW RBC AUTO: 12.9 % (ref 12.4–15.4)
EOSINOPHIL # BLD: 0.2 K/UL (ref 0–0.6)
EOSINOPHIL NFR BLD: 1.6 %
GFR SERPLBLD CREATININE-BSD FMLA CKD-EPI: >60 ML/MIN/{1.73_M2}
GLUCOSE BLD-MCNC: 121 MG/DL (ref 70–99)
GLUCOSE BLD-MCNC: 122 MG/DL (ref 70–99)
GLUCOSE BLD-MCNC: 123 MG/DL (ref 70–99)
GLUCOSE SERPL-MCNC: 123 MG/DL (ref 70–99)
HCT VFR BLD AUTO: 37.2 % (ref 40.5–52.5)
HGB BLD-MCNC: 12.4 G/DL (ref 13.5–17.5)
LYMPHOCYTES # BLD: 2.5 K/UL (ref 1–5.1)
LYMPHOCYTES NFR BLD: 22.3 %
MCH RBC QN AUTO: 32.2 PG (ref 26–34)
MCHC RBC AUTO-ENTMCNC: 33.4 G/DL (ref 31–36)
MCV RBC AUTO: 96.3 FL (ref 80–100)
MONOCYTES # BLD: 0.8 K/UL (ref 0–1.3)
MONOCYTES NFR BLD: 7.4 %
NEUTROPHILS # BLD: 7.7 K/UL (ref 1.7–7.7)
NEUTROPHILS NFR BLD: 67.9 %
PERFORMED ON: ABNORMAL
PLATELET # BLD AUTO: 255 K/UL (ref 135–450)
PMV BLD AUTO: 7.9 FL (ref 5–10.5)
POTASSIUM SERPL-SCNC: 4.3 MMOL/L (ref 3.5–5.1)
PREALB SERPL-MCNC: 23.7 MG/DL (ref 20–40)
RBC # BLD AUTO: 3.86 M/UL (ref 4.2–5.9)
SODIUM SERPL-SCNC: 136 MMOL/L (ref 136–145)
WBC # BLD AUTO: 11.3 K/UL (ref 4–11)

## 2023-09-20 PROCEDURE — 92610 EVALUATE SWALLOWING FUNCTION: CPT

## 2023-09-20 PROCEDURE — 94760 N-INVAS EAR/PLS OXIMETRY 1: CPT

## 2023-09-20 PROCEDURE — 85025 COMPLETE CBC W/AUTO DIFF WBC: CPT

## 2023-09-20 PROCEDURE — 97530 THERAPEUTIC ACTIVITIES: CPT

## 2023-09-20 PROCEDURE — 6370000000 HC RX 637 (ALT 250 FOR IP): Performed by: PHYSICAL MEDICINE & REHABILITATION

## 2023-09-20 PROCEDURE — 97535 SELF CARE MNGMENT TRAINING: CPT

## 2023-09-20 PROCEDURE — 97166 OT EVAL MOD COMPLEX 45 MIN: CPT

## 2023-09-20 PROCEDURE — 97129 THER IVNTJ 1ST 15 MIN: CPT

## 2023-09-20 PROCEDURE — 84134 ASSAY OF PREALBUMIN: CPT

## 2023-09-20 PROCEDURE — 97112 NEUROMUSCULAR REEDUCATION: CPT

## 2023-09-20 PROCEDURE — 1280000000 HC REHAB R&B

## 2023-09-20 PROCEDURE — 92526 ORAL FUNCTION THERAPY: CPT

## 2023-09-20 PROCEDURE — 97163 PT EVAL HIGH COMPLEX 45 MIN: CPT

## 2023-09-20 PROCEDURE — 80048 BASIC METABOLIC PNL TOTAL CA: CPT

## 2023-09-20 PROCEDURE — 36415 COLL VENOUS BLD VENIPUNCTURE: CPT

## 2023-09-20 RX ORDER — TIZANIDINE 2 MG/1
2 TABLET ORAL EVERY 6 HOURS PRN
COMMUNITY

## 2023-09-20 RX ORDER — CLONIDINE HYDROCHLORIDE 0.3 MG/1
0.3 TABLET ORAL 3 TIMES DAILY
Status: ON HOLD | COMMUNITY
End: 2023-10-11 | Stop reason: SDUPTHER

## 2023-09-20 RX ORDER — BISACODYL 10 MG
10 SUPPOSITORY, RECTAL RECTAL DAILY PRN
COMMUNITY

## 2023-09-20 RX ORDER — LEVETIRACETAM 1000 MG/1
1000 TABLET ORAL 2 TIMES DAILY
Status: ON HOLD | COMMUNITY
End: 2023-10-12 | Stop reason: HOSPADM

## 2023-09-20 RX ORDER — LANOLIN ALCOHOL/MO/W.PET/CERES
3 CREAM (GRAM) TOPICAL NIGHTLY
Status: ON HOLD | COMMUNITY
End: 2023-10-12 | Stop reason: HOSPADM

## 2023-09-20 RX ORDER — ATORVASTATIN CALCIUM 40 MG/1
40 TABLET, FILM COATED ORAL DAILY
COMMUNITY

## 2023-09-20 RX ORDER — CARVEDILOL 25 MG/1
25 TABLET ORAL 2 TIMES DAILY WITH MEALS
Status: ON HOLD | COMMUNITY
End: 2023-10-11 | Stop reason: SDUPTHER

## 2023-09-20 RX ORDER — AMLODIPINE BESYLATE 10 MG/1
10 TABLET ORAL DAILY
COMMUNITY

## 2023-09-20 RX ORDER — VALSARTAN 160 MG/1
160 TABLET ORAL DAILY
Status: ON HOLD | COMMUNITY
End: 2023-10-12 | Stop reason: HOSPADM

## 2023-09-20 RX ORDER — HYDRALAZINE HYDROCHLORIDE 100 MG/1
100 TABLET, FILM COATED ORAL 3 TIMES DAILY
COMMUNITY

## 2023-09-20 RX ADMIN — HYDRALAZINE HYDROCHLORIDE 100 MG: 50 TABLET, FILM COATED ORAL at 05:02

## 2023-09-20 RX ADMIN — LEVETIRACETAM 1000 MG: 500 TABLET, FILM COATED ORAL at 08:13

## 2023-09-20 RX ADMIN — OXYCODONE AND ACETAMINOPHEN 1 TABLET: 5; 325 TABLET ORAL at 04:58

## 2023-09-20 RX ADMIN — OXYCODONE AND ACETAMINOPHEN 1 TABLET: 5; 325 TABLET ORAL at 18:00

## 2023-09-20 RX ADMIN — CLONIDINE HYDROCHLORIDE 0.3 MG: 0.1 TABLET ORAL at 08:17

## 2023-09-20 RX ADMIN — CARVEDILOL 25 MG: 25 TABLET, FILM COATED ORAL at 17:16

## 2023-09-20 RX ADMIN — CLONIDINE HYDROCHLORIDE 0.3 MG: 0.1 TABLET ORAL at 21:03

## 2023-09-20 RX ADMIN — Medication 3 MG: at 23:31

## 2023-09-20 RX ADMIN — CARVEDILOL 25 MG: 25 TABLET, FILM COATED ORAL at 08:20

## 2023-09-20 RX ADMIN — AMLODIPINE BESYLATE 10 MG: 10 TABLET ORAL at 08:22

## 2023-09-20 RX ADMIN — OXYCODONE AND ACETAMINOPHEN 1 TABLET: 5; 325 TABLET ORAL at 22:28

## 2023-09-20 RX ADMIN — FAMOTIDINE 20 MG: 20 TABLET ORAL at 08:16

## 2023-09-20 RX ADMIN — VALSARTAN 160 MG: 160 TABLET ORAL at 08:12

## 2023-09-20 RX ADMIN — SENNOSIDES AND DOCUSATE SODIUM 1 TABLET: 50; 8.6 TABLET ORAL at 08:23

## 2023-09-20 RX ADMIN — OXYCODONE AND ACETAMINOPHEN 1 TABLET: 5; 325 TABLET ORAL at 13:55

## 2023-09-20 RX ADMIN — HYDRALAZINE HYDROCHLORIDE 100 MG: 50 TABLET, FILM COATED ORAL at 14:00

## 2023-09-20 RX ADMIN — SENNOSIDES AND DOCUSATE SODIUM 1 TABLET: 50; 8.6 TABLET ORAL at 21:03

## 2023-09-20 RX ADMIN — LEVETIRACETAM 1000 MG: 500 TABLET, FILM COATED ORAL at 21:03

## 2023-09-20 RX ADMIN — HYDRALAZINE HYDROCHLORIDE 100 MG: 50 TABLET, FILM COATED ORAL at 21:03

## 2023-09-20 RX ADMIN — CLONIDINE HYDROCHLORIDE 0.3 MG: 0.1 TABLET ORAL at 14:00

## 2023-09-20 RX ADMIN — ATORVASTATIN CALCIUM 40 MG: 40 TABLET, FILM COATED ORAL at 08:20

## 2023-09-20 RX ADMIN — FAMOTIDINE 20 MG: 20 TABLET ORAL at 21:03

## 2023-09-20 ASSESSMENT — PAIN SCALES - GENERAL
PAINLEVEL_OUTOF10: 0
PAINLEVEL_OUTOF10: 9
PAINLEVEL_OUTOF10: 8
PAINLEVEL_OUTOF10: 0
PAINLEVEL_OUTOF10: 8
PAINLEVEL_OUTOF10: 8
PAINLEVEL_OUTOF10: 0
PAINLEVEL_OUTOF10: 9
PAINLEVEL_OUTOF10: 0
PAINLEVEL_OUTOF10: 8
PAINLEVEL_OUTOF10: 9

## 2023-09-20 ASSESSMENT — PAIN DESCRIPTION - DESCRIPTORS
DESCRIPTORS: ACHING
DESCRIPTORS: ACHING;DISCOMFORT
DESCRIPTORS: ACHING
DESCRIPTORS: ACHING

## 2023-09-20 ASSESSMENT — PAIN DESCRIPTION - PAIN TYPE
TYPE: ACUTE PAIN

## 2023-09-20 ASSESSMENT — PAIN DESCRIPTION - FREQUENCY
FREQUENCY: CONTINUOUS

## 2023-09-20 ASSESSMENT — PAIN DESCRIPTION - ORIENTATION
ORIENTATION: RIGHT
ORIENTATION: RIGHT;LEFT
ORIENTATION: RIGHT

## 2023-09-20 ASSESSMENT — PAIN DESCRIPTION - ONSET
ONSET: ON-GOING

## 2023-09-20 ASSESSMENT — PAIN DESCRIPTION - LOCATION
LOCATION: HEAD;EYE
LOCATION: ARM
LOCATION: HEAD;EYE

## 2023-09-20 ASSESSMENT — PAIN - FUNCTIONAL ASSESSMENT
PAIN_FUNCTIONAL_ASSESSMENT: PREVENTS OR INTERFERES SOME ACTIVE ACTIVITIES AND ADLS

## 2023-09-20 NOTE — PLAN OF CARE
Problem: Discharge Planning  Goal: Discharge to home or other facility with appropriate resources  9/19/2023 2333 by Morgan Madrigal RN  Outcome: Progressing  9/19/2023 2333 by Morgan Madrigal RN  Outcome: Progressing     Problem: Pain  Goal: Verbalizes/displays adequate comfort level or baseline comfort level  9/19/2023 2333 by Morgan Madrigal RN  Outcome: Progressing  9/19/2023 2333 by Morgan Madrigal RN  Outcome: Progressing     Problem: Skin/Tissue Integrity  Goal: Absence of new skin breakdown  Description: 1. Monitor for areas of redness and/or skin breakdown  2. Assess vascular access sites hourly  3. Every 4-6 hours minimum:  Change oxygen saturation probe site  4. Every 4-6 hours:  If on nasal continuous positive airway pressure, respiratory therapy assess nares and determine need for appliance change or resting period.   9/19/2023 2333 by Morgan Madrigal RN  Outcome: Progressing  9/19/2023 2333 by Morgan Madrigal RN  Outcome: Progressing     Problem: ABCDS Injury Assessment  Goal: Absence of physical injury  Outcome: Progressing

## 2023-09-20 NOTE — PROGRESS NOTES
Pt resting with family at bedside. Pt is alert and oriented. Complaining of headache and pain behind right eye. Pt is incontinent. Left side flaccid and right side weak. Repositioned and pillows for pt comfort. No skin issues noted. Swallows pills whole with nectar thick liquids. Instructed to call out for needs. Will continue to monitor.

## 2023-09-20 NOTE — PROGRESS NOTES
Occupational Therapy  Facility/Department: [facility]  Rehabilitation Occupational Therapy Evaluation       Date: 23  Patient Name: Omid Hughes       Room: P7Q-2085/7343-87  MRN: [id number]  Account: [de-identified]   : 1970  (50 y.o.) Gender: male     Referring Practitioner: Dr Anitha Stroud  Diagnosis: CVA  Additional Pertinent Hx: pt is a 51 y/o male who arrived from [address] [place] [address] after having a stroke (R basal ganglia/frontal lobe intraparenchymal  hemorrhage w/ L midline shift). Had sudden onset of L sided weakness, facial droop & HA. Was found to be hypertensive to the 103'R systolic. PMhx: HTN HLD, NSTEMI, RBB, smoker, chronic pancreatitis, stage 3 CKD    Restrictions  Restrictions/Precautions: Fall Risk;Aspiration Risk  Other position/activity restrictions: soft & bite sized diet w/ nectar thick liquids,  Equipment Used: Bed;Wheelchair    Subjective  Subjective: met in room, pt groggy, in bed, head turned to R, denies pain  Comments: agreeable for OT evaluation          Vitals  Level of Consciousness: Alert (0)    Objective     Cognition  Overall Cognitive Status: Exceptions  Following Commands: Follows one step commands consistently  Attention Span: Attends with cues to redirect  Memory: Decreased short term memory  Safety Judgement: Decreased awareness of need for assistance;Decreased awareness of need for safety  Problem Solving: Assistance required to generate solutions  Insights: Not aware of deficits  Initiation: Requires cues for all  Sequencing: Requires cues for all  Cognition Comment: pt groggy/lethargic, L facial droop, slow processing/responding,mild slurred speech & word finding issues; occasional stuttering noted;  L neglect, bouts of tearfulness & agitation, labile, head turned to R.  Made sexually inappropriate comments  Orientation  Overall Orientation Status: Within Normal Limits  Orientation Level: Oriented X4   Perception  Overall Perceptual Status: deficit; Decreased cognition;Decreased ADL status; Decreased ROM; Decreased endurance;Decreased strength;Decreased sensation;Decreased posture;Decreased coordination  Assessment: pt is a 49 y/o male who arrived from [facility] after CVA--now presents w/ L neglect, flaccid L UE & LE, impaired balance and poor body awareness. 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, also able to drive. Currently pt is requiring mod A of 2 for bed mobility, sit to stand transitions & SPT bed to w/c, significant pushing to L side and neglect noted. He required mod to max A w/ oral care & washing face, feeding himself; required up to max/total A of 2 for LB dressing & sponge bathing in bed, required mod A of 2 for bed mobility & to sit on EOB. Pt is functioning well below previous baseline & would benefit from OT services on rehab x 3-4 wks, will need to be co tx'ed to address balance & safe t/f  Treatment Diagnosis: impaired ADL, balance, cognition  Prognosis: Fair  Decision Making: Medium Complexity  Treatment Initiated : Wed 9/20  Discharge Recommendations: Continue to assess pending progress; Patient would benefit from continued therapy after discharge  Occupational Therapy Plan  Times Per Week: 5-6  Times Per Day: Twice a day  Hours Per Day: 1.5 hours  Therapy Duration: 4 Weeks  Current Treatment Recommendations: Strengthening;ROM;Balance training;Functional mobility training; Endurance training; Wheelchair mobility training; Safety education & training;Patient/Caregiver education & training;Equipment evaluation, education, & procurement;Self-Care / ADL       Therapy Time   Individual Concurrent Group Co-treatment   Time In 1000     1030   Time Out 1030     1130   Minutes 30     60   Timed Code Treatment Minutes: [address], [place] #[id number]

## 2023-09-20 NOTE — PROGRESS NOTES
PCA called this nurse to inform me that left sided weakness and speech seemed worse than this morning. This nurse went to assess pt. AxOx4. VSS. Speech and left sided neglect are the same as this morning's assessment. Patient is still complaining of headache and pain behind eyes. Family at bedside. Call light within reach. Went over medications with family.  Electronically signed by Wilber Mckenzie RN on 9/20/2023 at 4:20 PM

## 2023-09-20 NOTE — PROGRESS NOTES
pharyngeal constriction and reduced hyolaryngeal elevation with incomplete laryngeal vestibule closure. Recommendation for IDDSI soft/bite size food consistencies; mildly thick liquids; feed only when alert as pt has been lethargic    MRI Brain: 9/16/2023:   IMPRESSION:   1. No interval change in the right basal ganglia hemorrhage with unchanged surrounding edema and associated mass effect and midline shift. 2.  Mild small vessel disease. Current Diet level:  Current Diet : Soft and Bite-Sized  Current Liquid Diet : Mildly Thick    Primary Complaint  Pt and staff reporting wanting diet upgrade      Reason for Referral  Omid Hughes was referred for a bedside swallow evaluation to assess the efficiency of his swallow function, identify signs and symptoms of aspiration and make recommendations regarding safe dietary consistencies, effective compensatory strategies, and safe eating environment. Pt was active with SLP at other facility with recommendations for continued therapy  MD referral for SLP evaluation    Impression  1. Oropharyngeal dysphagia with risk prior to swallow and risk post swallow. Oropharyngeal dysphagia impairments and s/s appear consistent with previous FEES impressions. Oropharyngeal Dysphagia characterized by left oral motor muscular weakness/sensory deficits impacting mastication, bolus control and formation/cohesion and A-P oral transit; delayed initiation of swallow; and concern for reduced laryngeal rom/tongue base retraction and pharyngeal clearance. Pt needs set up and supervision. Pt needs oral care post meal and med pass due to left anterior loss and oral pocketing left and reduced sensation. aspirations/s with thin liquids  Feed only when alert. Visual deficits impact ease of self feeding or use of table top and food tray  Pt insight also exacerbates  Recommendations:   Continue diet as ordered IDDSI soft/bite size food consistencies with mildly thick liquids. SLP therapy targeting om ex; bolus control/formation ex; laryngeal /pharyngeal ex and sensory stimulation. Therapy will include therapy po trials  Anticipate MBSS prior to d/c from ARU and potentially prior to diet upgrade    2. Pt was too lethargic for participation in SLP/cognitive linguistic eval.Will re-attempt 9/21/2023    Dysphagia Outcome Severity Scale: Level 3: Moderate dysphagia- Total assistance, supervision or strategies. Two or more diet consistencies restricted     Treatment Plan  Requires SLP Intervention: Yes  Duration of Treatment: 5 times a week hopefully as endurance improves will be able to increase to 2 x a day    Recommended Diet and Intervention  Diet Solids Recommendation: Soft & Bite Sized  Liquid Consistency Recommendation: Mildly Thick (Nectar)  Recommended Form of Meds: Crushed in puree as able     Therapeutic Interventions: Bolus control exercises;Diet tolerance monitoring; Therapeutic PO trials with SLP;Pharyngeal exercises; Patient/Family education; Laryngeal exercises    Compensatory Swallowing Strategies  Compensatory Swallowing Strategies : Upright as possible for all oral intake;Small bites/sips;Swallow 2 times per bite/sip; Remain upright for 30-45 minutes after meals; Check for pocketing of food on the Left    Treatment/Goals  pt wants to eat more regular foods  Dysphagia Goals: The patient will tolerate recommended diet without observed clinical signs of aspiration; The patient will improve oral preparation phase via bolus control/manipulation exercises to 5/5 each trial.;The patient/caregiver will demonstrate understanding of compensatory strategies for improved swallowing safety.  (Pt will demonstrate improved swallow response via laryngeal/pharyngeal ex; sensory stimulation for therapy trials of easy to chew food and/or thin liquids; AND demonstrate improved ease of bolus control for reduced severity /frequency of oral pocketing)    General  Chart Reviewed: Dysfunction   Pharyngeal Phase  Pharyngeal Phase: Exceptions  Indicators of Pharyngeal Phase Dysfunction  Delayed Swallow: All  Decreased Laryngeal Elevation:  (potential reduced laryngeal rom and tongue base retraction)  Throat Clearing - Delayed:  (noted)  Cough - Delayed:  (noted)  Pharyngeal Phase Comment: risk post swallow of any oral residue pooling to pharynx with poor sensation. Potential reduced pharyngeal clearance  Pharyngeal Phase   Pharyngeal Phase: Exceptions-aspiration s/s with thin liquids    Prognosis  Prognosis: Good (guarded; medical DX; fatigue/lethargy; pain complaints and potential med interreactions)  Consulted and agree with results and recommendations: Patient;RN;OT    Education  Patient Education Response: Verbalizes understanding;Needs reinforcement  Safety Devices in place: Yes  Type of devices: All fall risk precautions in place;Call light within reach; Chair alarm in place       Therapy Time  SLP Individual Minutes  Time In: 1130  Time Out: 8847  Minutes: 45   Coded time: 13       Signed  Lorena VillarMS,CCC,SLP [id number]  Speech and Language Pathologist  9/20/2023 3:52 PM

## 2023-09-20 NOTE — CONSULTS
Comprehensive Nutrition Assessment    Type and Reason for Visit:  Consult (ARU admit)    Nutrition Recommendations/Plan:   Continue soft and bite sized diet. Malnutrition Assessment:  Malnutrition Status:  No malnutrition (09/20/23 0911)    Context:  Acute Illness     Findings of the 6 clinical characteristics of malnutrition:  Energy Intake:  No significant decrease in energy intake  Weight Loss:  No significant weight loss     Body Fat Loss:  No significant body fat loss     Muscle Mass Loss:  No significant muscle mass loss    Fluid Accumulation:  No significant fluid accumulation      Nutrition Assessment:    RD consult for ARU admission. Pt. admitted for nontraumatic intracranial hemorrhage. Currently receiving a soft and bite sized diet. Tolerating well per nursing. Pt. was very fatigued at the time of visit. Pt. fell asleep several times while RD was attempting to assess. No new recommendations at this time. Will follow up when patient is more alert. Nutrition Related Findings:    BUN 26, Cr. 1.4. Last BM 9/19. Wound Type: None       Current Nutrition Intake & Therapies:    Average Meal Intake: Unable to assess  Average Supplements Intake: None Ordered  ADULT DIET; Dysphagia - Soft and Bite Sized; Mildly Thick (Nectar)    Anthropometric Measures:  Height: 6' (182.9 cm)  Ideal Body Weight (IBW): 178 lbs (81 kg)       Current Body Weight: 203 lb 14.8 oz (92.5 kg), 114.6 % IBW. Current BMI (kg/m2): 27.7                          BMI Categories: Overweight (BMI 25.0-29. 9)    Estimated Daily Nutrient Needs:  Energy Requirements Based On: Kcal/kg  Weight Used for Energy Requirements: Current  Energy (kcal/day): 1860-2046kcal (20-22kcal/kg)  Weight Used for Protein Requirements: Current  Protein (g/day): 93-112g (1-1.2g/kg)  Method Used for Fluid Requirements: 1 ml/kcal  Fluid (ml/day): Or per provider.     Nutrition Diagnosis:   No nutrition diagnosis at this time related to   as evidenced by

## 2023-09-20 NOTE — PROGRESS NOTES
Patient admitted to rehab with nontraumatic intracranial hemorrhage. A/Ox4. Transfers with stedy x2. Mobility restrictions: flaccid left side. On soft and bite sized, nectar thick liquids diet, tolerating well. Medications taken whole with nectar thick. Skin: intact. Oxygen: RA. LDA: none. Has been continent of bowel and incontinent of bladder. LBM 9/19/23. Chair/bed alarms in use and call light in reach. Will monitor for safety.  Electronically signed by Desiree Mata RN on 9/20/2023 at 11:20 AM

## 2023-09-20 NOTE — PROGRESS NOTES
Ethnicity  \"Are you of , /a, or Bangladeshi origin? \"  Check all that apply:  [x] A. No, not of , /a, or [place] Origin  [] B.  Yes, [place], [place] American, Chicano/a  [] C.  Yes, [facility]  [] D.  Yes, [place]  [] E.  Yes, another , , or Bangladeshi origin  [] X. Patient unable to respond    Race  \"What is your race? \"  Check all that apply:  [] A. White  [x] B. Black or   [] C. American [place] or [place] Native  [] D.  [place]  [] E. [place]  [] F. Mosotho  [] G. [place]  [] Marek Roberto  [] I. [place]  [] J.  Other   [] K.   [] L. Andorran or Charlene  [] M. Nauruan  [] N. Other [address] [place] [address]  [] X. Patient unable to respond    High Risk Drug Classes:  Use and Indication    Is taking: Check if the pt is taking any medications by pharmacological classification, not how it is used, in the following classes  Indication noted: If column 1 is checked, check if there is an indication noted for all meds in the drug class Is taking  (check all that apply) Indication noted (check all that apply)   Antipsychotic [] []   Anticoagulant [] []   Antibiotic [] []   Opioid [x] [x]   Antiplatelet [] []   Hypoglycemic (including insulin) [] []   None of the above []     Special Treatments, Procedures, and Programs    Check all of the following treatments, procedures, and programs that apply on admission. On admission (check all that apply)   Cancer Treatments   A1. Chemotherapy []           A2. IV []           A3. Oral []           A10. Other []   B1. Radiation []   Respiratory Therapies   C1. Oxygen Therapy []           C2. Continuous []           C3. Intermittent []           C4. High-concentration []   D1. Suctioning []           D2. Scheduled []           D3. As needed []   E1. Tracheostomy Care []   F1.  Invasive Mechanical Ventilator (ventilator or respirator) []   G1. Non-invasive Mechanical Ventilator []           G2. BiPAP [] G3. CPAP []   Other   H1. IV Medications []           H2. Vasoactive medications []           H3. Antibiotics []           H4. Anticoagulation []           H10. Other []   I1. Transfusions []   J1. Dialysis []           J2. Hemodialysis []           J3. Peritoneal dialysis []   O1. IV access []           O2. Peripheral []           O3. Midline []           O4.  Central (PICC, tunneled, port) []      None of the above (select if no Cancer, Respiratory, or Other boxes are checked) [x]

## 2023-09-20 NOTE — PLAN OF CARE
Problem: Discharge Planning  Goal: Discharge to home or other facility with appropriate resources  9/20/2023 1117 by Matthew Dowling RN  Outcome: Progressing  Flowsheets (Taken 9/20/2023 0808)  Discharge to home or other facility with appropriate resources:   Identify barriers to discharge with patient and caregiver   Arrange for needed discharge resources and transportation as appropriate   Identify discharge learning needs (meds, wound care, etc)   Refer to discharge planning if patient needs post-hospital services based on physician order or complex needs related to functional status, cognitive ability or social support system     Problem: Pain  Goal: Verbalizes/displays adequate comfort level or baseline comfort level  9/20/2023 1117 by Matthew Dowling RN  Outcome: Progressing  Flowsheets (Taken 9/20/2023 0808)  Verbalizes/displays adequate comfort level or baseline comfort level:   Encourage patient to monitor pain and request assistance   Assess pain using appropriate pain scale   Administer analgesics based on type and severity of pain and evaluate response   Implement non-pharmacological measures as appropriate and evaluate response     Problem: Skin/Tissue Integrity  Goal: Absence of new skin breakdown  Description: 1. Monitor for areas of redness and/or skin breakdown  2. Assess vascular access sites hourly  3. Every 4-6 hours minimum:  Change oxygen saturation probe site  4. Every 4-6 hours:  If on nasal continuous positive airway pressure, respiratory therapy assess nares and determine need for appliance change or resting period.   9/20/2023 1117 by Matthew Dowling RN  Outcome: Progressing     Problem: ABCDS Injury Assessment  Goal: Absence of physical injury  9/20/2023 1117 by Matthew Dowling RN  Outcome: Progressing  Flowsheets (Taken 9/20/2023 0959)  Absence of Physical Injury: Implement safety measures based on patient assessment     Problem: Chronic Conditions and Co-morbidities  Goal:

## 2023-09-20 NOTE — PLAN OF CARE
Mgmt   [x] Infection Protection   [x] DVT Prophylaxis   [x] Fall Precautions  [x] Fluid/Electrolyte/Nutrition Balance   [] Voice Protection   [] Respiratory  [] Other:    Medical Prognosis: [x] Good  [] Fair    [] Guarded   Total expected IRF days 23 days  Anticipated discharge destination:    [] Home Independently   [x] Home Modified Independent  [x] Home with supervision    []SNF     [] Other                                           Physician anticipated functional outcomes:  [ ]   IPOC brief synthesis: Patient is a 51 yo M with pmh NSTEMI (2020) RBBB, HTN, HLD, CKD III, and chronic pancreatitis who initially presented to  on 9/14/2023 with acute onset left side weakness. He had been complaining of headache throughout the day prior to this. CT head revealed acute basal ganglia and right frontal lobe intraparenchymal hemorrhage w/ significant mass effect on the lateral ventricles R>L and leftward midline shift. CTA negative for vascular malformation. Systolic blood pressure was reportedly in the 250s on arrival. He received 500ml hypertonic saline, Keppra load, and was started on nicardipine gtt prior to being admitted to NSICU. He was started on PO HTN meds and weaned off nicardipine gttt on 9/16. Repeat [facility] 9/16 was stable. Course was complicated by dysphagia, difficult to control HTN. Now presents to ARU with impaired mobility, self-care, and cognition below his baseline. He requires comprehensive inpatient rehab program in order to return to community setting. I have reviewed this initial plan of care and agree with its contents:    Title   Name    Date    Time    Physician: Amadou Lucero MD 9/22/2023, 2:32 PM      Case [address] [place] [address]     Case Management   [phone]    9/22/2023  2:32 PM      OT: ROLANDA Castillo/ANGELLA #[id number]     PT:Electronically signed by Radu Krueger PT on 9/20/23 at 3:20 PM EDT    ST: late entry for 9/20/2023 and 9/21/2023 for NATHALIE and SLP assessments ; Eva Villar MS,CCC,SLP [id number] Speech and Language Pathologist 9/22/2023 at 1433 pm      [address] 9/21/2023    Other:

## 2023-09-20 NOTE — PROGRESS NOTES
Physical Therapy  Progress Note  Orlin Mason  [id number]    PT Evaluation was initiated but due to time constraint and patient's level of assistance required unable to complete full assessment. Plan to complete evaluation this PM with full write up to follow. Therapy Time     Individual Co-treatment   Time In   1794   Time Out   1130   Minutes   60      Electronically signed by JOSE ANGEL Sheridan on 9/20/23 at 11:33 AM EDT  Therapist was present, directed the patient's care, made skilled judgement, and was responsible for assessment and treatment of the patient.       Ivan Oreilly, [id number]

## 2023-09-20 NOTE — PROGRESS NOTES
Physical Therapy  Facility/Department: [address] REHAB  Rehabilitation Physical Therapy Initial Assessment    NAME: Geremias Rose  : 1970 (50 y.o.)  MRN: [id number]  CODE STATUS: Full Code    Date of Service: 23      Past Medical History:   Diagnosis Date    Hypertension     Pancreatitis      Past Surgical History:   Procedure Laterality Date    APPENDECTOMY         Patient assessed for rehabilitation services?: Yes    Restrictions:  Restrictions/Precautions: Fall Risk;Aspiration Risk  Position Activity Restriction  Other position/activity restrictions: soft & bite sized diet w/ nectar thick liquids,     SUBJECTIVE  Subjective: Patient supine in hospital bed with OT present at start of session. Observed L neglect. Patient reports sorness in arms from IV and per OT patient mentioned N/T of upper extremities. Mentioned headache pain but did not report numerical value.             Prior Level of Function:  Social/Functional History  Lives With: Family (niece Jarek Syed and her children)  Type of Home: House  Home Layout: Two level, Laundry in basement (pt lives in basement, bathrm on 2nd fl, has 1/2 bathrm on main level)  Home Access: Stairs to enter with rails  Entrance Stairs - Number of Steps: 3 steps + HR front door, has full flight of steps to get into basement B HR  Bathroom Shower/Tub: Tub/Shower unit  Bathroom Toilet: Standard  Bathroom Accessibility: [facility] accessible  Has the patient had two or more falls in the past year or any fall with injury in the past year?: No  ADL Assistance: Independent  Homemaking Responsibilities: Yes  Ambulation Assistance: Independent  Transfer Assistance: Independent  Active : Yes  Occupation: Full time employment  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 Patient demonstrated decreased activity tolerance, L Neglect, L sided weakness, poor trunk and head control, decreased insight and safety awareness secondary to acute CVA. He is currently max assist of 2 for transfers and requires [place] Plus to stand safely. Patient was independent prior to admission and would benefit from skilled therapy to improve strength, functional mobility and overall tolerance to activity to return to prior level of function. Treatment Diagnosis: CVA  Therapy Prognosis: Good  Decision Making: High Complexity  History: see above  Exam: see above  Clinical Presentation: evolving  Barriers to Learning: Cognition, Decreased insight  Discharge Recommendations: Continue to assess pending progress; Patient would benefit from continued therapy after discharge; Therapy recommended at discharge  PT D/C Equipment  Other: continue to assess pending patient progress  PT Equipment Recommendations  Other: continue to assess pending patient progress    CLINICAL IMPRESSION   Mr. \"Richard\" Ishmael Addison is a 50year old male admitted to the rehab unit with acute CVA. Patient demonstrated decreased activity tolerance, L Neglect, L sided weakness, poor trunk and head control, decreased insight and safety awareness secondary to acute CVA. He is currently max assist of 2 for transfers and requires [place] Plus to stand safely. Patient was independent prior to admission and would benefit from skill therapy to improve strength, functional mobility and overall tolerance to activity to return to prior level of function.     GOALS  Patient Goals   Patient Goals : \" get back to skating\"  Short Term Goals  Time Frame for Short Term Goals: 2 weeks  Short Term Goal 1: Perform bed mobility with max x 1 assist.  Short Term Goal 2: Perform transfers with mod x 1  Short Term Goal 3: Demonstrate good static sitting balance with supervision  Short Term Goal 4: Demonstrate good static standing balance 4 weeks         Therapy Time   Individual Concurrent Group Co-treatment   Time In 0395 [id number]   Time Out 6007     5983   Minutes 39     [address], [place], 09/20/23 at 4:31 PM     Therapist was present, directed the patient's care, made skilled judgement, and was responsible for assessment and treatment of the patient.         Del Dowling, [id number]

## 2023-09-21 LAB
ANION GAP SERPL CALCULATED.3IONS-SCNC: 12 MMOL/L (ref 3–16)
BASOPHILS # BLD: 0 K/UL (ref 0–0.2)
BASOPHILS NFR BLD: 0.4 %
BUN SERPL-MCNC: 23 MG/DL (ref 7–20)
CALCIUM SERPL-MCNC: 8.9 MG/DL (ref 8.3–10.6)
CHLORIDE SERPL-SCNC: 103 MMOL/L (ref 99–110)
CO2 SERPL-SCNC: 22 MMOL/L (ref 21–32)
CREAT SERPL-MCNC: 1.3 MG/DL (ref 0.9–1.3)
DEPRECATED RDW RBC AUTO: 12.7 % (ref 12.4–15.4)
EOSINOPHIL # BLD: 0.1 K/UL (ref 0–0.6)
EOSINOPHIL NFR BLD: 1 %
GFR SERPLBLD CREATININE-BSD FMLA CKD-EPI: >60 ML/MIN/{1.73_M2}
GLUCOSE BLD-MCNC: 131 MG/DL (ref 70–99)
GLUCOSE SERPL-MCNC: 156 MG/DL (ref 70–99)
HCT VFR BLD AUTO: 38.1 % (ref 40.5–52.5)
HGB BLD-MCNC: 13.3 G/DL (ref 13.5–17.5)
LYMPHOCYTES # BLD: 1.9 K/UL (ref 1–5.1)
LYMPHOCYTES NFR BLD: 17.5 %
MCH RBC QN AUTO: 33.1 PG (ref 26–34)
MCHC RBC AUTO-ENTMCNC: 35 G/DL (ref 31–36)
MCV RBC AUTO: 94.6 FL (ref 80–100)
MONOCYTES # BLD: 0.7 K/UL (ref 0–1.3)
MONOCYTES NFR BLD: 6.3 %
NEUTROPHILS # BLD: 8.2 K/UL (ref 1.7–7.7)
NEUTROPHILS NFR BLD: 74.8 %
PERFORMED ON: ABNORMAL
PLATELET # BLD AUTO: 260 K/UL (ref 135–450)
PMV BLD AUTO: 7.8 FL (ref 5–10.5)
POTASSIUM SERPL-SCNC: 4.3 MMOL/L (ref 3.5–5.1)
RBC # BLD AUTO: 4.02 M/UL (ref 4.2–5.9)
SODIUM SERPL-SCNC: 137 MMOL/L (ref 136–145)
WBC # BLD AUTO: 10.9 K/UL (ref 4–11)

## 2023-09-21 PROCEDURE — 6370000000 HC RX 637 (ALT 250 FOR IP): Performed by: PHYSICAL MEDICINE & REHABILITATION

## 2023-09-21 PROCEDURE — 36415 COLL VENOUS BLD VENIPUNCTURE: CPT

## 2023-09-21 PROCEDURE — 92523 SPEECH SOUND LANG COMPREHEN: CPT

## 2023-09-21 PROCEDURE — 85025 COMPLETE CBC W/AUTO DIFF WBC: CPT

## 2023-09-21 PROCEDURE — 1280000000 HC REHAB R&B

## 2023-09-21 PROCEDURE — 94760 N-INVAS EAR/PLS OXIMETRY 1: CPT

## 2023-09-21 PROCEDURE — 80048 BASIC METABOLIC PNL TOTAL CA: CPT

## 2023-09-21 PROCEDURE — 97530 THERAPEUTIC ACTIVITIES: CPT

## 2023-09-21 PROCEDURE — 97530 THERAPEUTIC ACTIVITIES: CPT | Performed by: PHYSICAL THERAPIST

## 2023-09-21 PROCEDURE — 97129 THER IVNTJ 1ST 15 MIN: CPT

## 2023-09-21 PROCEDURE — 97112 NEUROMUSCULAR REEDUCATION: CPT | Performed by: PHYSICAL THERAPIST

## 2023-09-21 PROCEDURE — 97112 NEUROMUSCULAR REEDUCATION: CPT

## 2023-09-21 PROCEDURE — 97542 WHEELCHAIR MNGMENT TRAINING: CPT | Performed by: PHYSICAL THERAPIST

## 2023-09-21 RX ORDER — BUTALBITAL, ACETAMINOPHEN AND CAFFEINE 50; 325; 40 MG/1; MG/1; MG/1
1 TABLET ORAL EVERY 6 HOURS PRN
Status: DISCONTINUED | OUTPATIENT
Start: 2023-09-21 | End: 2023-10-12 | Stop reason: HOSPADM

## 2023-09-21 RX ORDER — TRAMADOL HYDROCHLORIDE 50 MG/1
50 TABLET ORAL EVERY 6 HOURS PRN
Status: DISCONTINUED | OUTPATIENT
Start: 2023-09-21 | End: 2023-10-12 | Stop reason: HOSPADM

## 2023-09-21 RX ADMIN — VALSARTAN 160 MG: 160 TABLET ORAL at 08:15

## 2023-09-21 RX ADMIN — CARVEDILOL 25 MG: 25 TABLET, FILM COATED ORAL at 08:22

## 2023-09-21 RX ADMIN — CLONIDINE HYDROCHLORIDE 0.3 MG: 0.1 TABLET ORAL at 08:17

## 2023-09-21 RX ADMIN — BUTALBITAL, ACETAMINOPHEN AND CAFFEINE 1 TABLET: 325; 50; 40 TABLET ORAL at 20:48

## 2023-09-21 RX ADMIN — TIZANIDINE 2 MG: 4 TABLET ORAL at 02:23

## 2023-09-21 RX ADMIN — ATORVASTATIN CALCIUM 40 MG: 40 TABLET, FILM COATED ORAL at 08:16

## 2023-09-21 RX ADMIN — FAMOTIDINE 20 MG: 20 TABLET ORAL at 20:48

## 2023-09-21 RX ADMIN — HYDRALAZINE HYDROCHLORIDE 100 MG: 50 TABLET, FILM COATED ORAL at 20:55

## 2023-09-21 RX ADMIN — SENNOSIDES AND DOCUSATE SODIUM 1 TABLET: 50; 8.6 TABLET ORAL at 08:22

## 2023-09-21 RX ADMIN — LEVETIRACETAM 1000 MG: 500 TABLET, FILM COATED ORAL at 20:49

## 2023-09-21 RX ADMIN — HYDRALAZINE HYDROCHLORIDE 100 MG: 50 TABLET, FILM COATED ORAL at 06:52

## 2023-09-21 RX ADMIN — CARVEDILOL 25 MG: 25 TABLET, FILM COATED ORAL at 17:35

## 2023-09-21 RX ADMIN — AMLODIPINE BESYLATE 10 MG: 10 TABLET ORAL at 08:22

## 2023-09-21 RX ADMIN — CLONIDINE HYDROCHLORIDE 0.3 MG: 0.1 TABLET ORAL at 20:53

## 2023-09-21 RX ADMIN — OXYCODONE AND ACETAMINOPHEN 1 TABLET: 5; 325 TABLET ORAL at 06:54

## 2023-09-21 RX ADMIN — TRAMADOL HYDROCHLORIDE 50 MG: 50 TABLET ORAL at 17:37

## 2023-09-21 RX ADMIN — SENNOSIDES AND DOCUSATE SODIUM 1 TABLET: 50; 8.6 TABLET ORAL at 20:49

## 2023-09-21 RX ADMIN — TIZANIDINE 2 MG: 4 TABLET ORAL at 20:49

## 2023-09-21 RX ADMIN — FAMOTIDINE 20 MG: 20 TABLET ORAL at 08:18

## 2023-09-21 RX ADMIN — LEVETIRACETAM 1000 MG: 500 TABLET, FILM COATED ORAL at 08:19

## 2023-09-21 ASSESSMENT — PAIN DESCRIPTION - FREQUENCY: FREQUENCY: CONTINUOUS

## 2023-09-21 ASSESSMENT — PAIN DESCRIPTION - DESCRIPTORS
DESCRIPTORS: ACHING;DISCOMFORT
DESCRIPTORS: ACHING
DESCRIPTORS: ACHING;DISCOMFORT
DESCRIPTORS: ACHING;DISCOMFORT;SPASM;TIGHTNESS

## 2023-09-21 ASSESSMENT — PAIN DESCRIPTION - LOCATION
LOCATION: HEAD;BACK
LOCATION: GENERALIZED;HEAD
LOCATION: BACK
LOCATION: NECK
LOCATION: GENERALIZED

## 2023-09-21 ASSESSMENT — PAIN SCALES - GENERAL
PAINLEVEL_OUTOF10: 9
PAINLEVEL_OUTOF10: 8
PAINLEVEL_OUTOF10: 9
PAINLEVEL_OUTOF10: 0
PAINLEVEL_OUTOF10: 8
PAINLEVEL_OUTOF10: 0
PAINLEVEL_OUTOF10: 9
PAINLEVEL_OUTOF10: 9
PAINLEVEL_OUTOF10: 8
PAINLEVEL_OUTOF10: 8

## 2023-09-21 ASSESSMENT — PAIN DESCRIPTION - ORIENTATION
ORIENTATION: MID;LOWER
ORIENTATION: RIGHT;POSTERIOR
ORIENTATION: RIGHT;POSTERIOR
ORIENTATION: MID;LOWER

## 2023-09-21 ASSESSMENT — PAIN DESCRIPTION - ONSET: ONSET: ON-GOING

## 2023-09-21 ASSESSMENT — PAIN SCALES - WONG BAKER
WONGBAKER_NUMERICALRESPONSE: 0

## 2023-09-21 ASSESSMENT — PAIN DESCRIPTION - PAIN TYPE: TYPE: ACUTE PAIN

## 2023-09-21 ASSESSMENT — PAIN - FUNCTIONAL ASSESSMENT
PAIN_FUNCTIONAL_ASSESSMENT: INTOLERABLE, UNABLE TO DO ANY ACTIVE OR PASSIVE ACTIVITIES
PAIN_FUNCTIONAL_ASSESSMENT: PREVENTS OR INTERFERES SOME ACTIVE ACTIVITIES AND ADLS

## 2023-09-21 NOTE — PROGRESS NOTES
Physical Therapy  PHYSICAL THERAPY  Progress Note   Second Session    Patient Name: [facility] Record Number: [id number]    Treatment Diagnosis: CVA          Restrictions/Precautions: Fall Risk, Aspiration Risk Other position/activity restrictions: soft & bite sized diet w/ nectar thick liquids,   Additional Pertinent Hx: per Dr. Margy Sidhu - Patient is a 51 yo M with pmh NSTEMI (2020) RBBB, HTN, HLD, CKD III, and chronic pancreatitis who initially presented to  on 9/14/2023 with acute onset left side weakness. He had been complaining of headache throughout the day prior to this. CT head revealed acute basal ganglia and right frontal lobe intraparenchymal hemorrhage w/ significant mass effect on the lateral ventricles R>L and leftward midline shift. CTA negative for vascular malformation. Systolic blood pressure was reportedly in the 250s on arrival. He received 500ml hypertonic saline, Keppra load, and was started on nicardipine gtt prior to being admitted to NSICU. He was started on PO HTN meds and weaned off nicardipine gttt on 9/16. Repeat [facility] 9/16 was stable. Course was complicated by dysphagia, difficult to control HTN. Now presents to ARU with impaired mobility, self-care, and cognition below his baseline. Currently, patient reports intermittent headache. He has severe left side weakness and decreased sensation left side. He has slurred speech. He has intermittent blurred vision. He is motivated to start inpatient rehabilitation program.        Subjective: Patient in wheelchair with OT present at start of session. Patient with difficulty keeping eyes open without verbal and tactile cues, including gentle sternal rub. Objective: Patient in wheelchair in parallel bars. Performed sit to stand from wheelchair with R UE support on parallel bar max 2. Max x 2 for static standing balance with significant left lateral lean requiring support and counter balance.  Required posterior hip facilitation tactile cues as well as frequent cues to keep head upright. Able to  parallel bars for 2 minutes x2 trials with max x 2. Patient attempted to use urinal in reclined wheelchair but unable to void. Patient returned patient to room via wheelchair and patient requested to try urinating while standing. Therapists use [place] Plus for sit to stand and stood dependently with poor head control and successfully able to void, dependent for clothing management and placement of urinal. Dependently stand to sit from [address] to bed. Patient sat edge of bed with mod-max of 2 for 10 minutes. Patient performed L elbow propping and weight shifting from midline requiring max assistance. Max x 2 for sit to supine and readjustment in bed. Ensured positionally needs with L arm supported. Assessment: Patient continued to show signs of lethargy this afternoon. Able to show increased signs of alertness with family present in patient's room. Safety Device - Type of devices:  [x]  All fall risk precautions in place [x] Bed alarm in place  [x] Call light within reach [] Chair alarm in place [] Positioning belt [] Gait belt [] Patient at risk for falls [] Left in bed [] Left in chair [] Telesitter in use [] Sitter present [] Nurse notified []  None      Therapy Time   Individual Co-treatment   Time In  1500   Time Out  1545   Minutes 45 45       Electronically signed by JOSE ANGEL Rockwell on 9/21/2023 at 4:29 PM    Therapist was present, directed the patient's care, made skilled judgement, and was responsible for assessment and treatment of the patient.         Electronically signed by Ren Alfredo PT #[id number] on 9/21/2023 at 4:35 PM

## 2023-09-21 NOTE — PROGRESS NOTES
Occupational Therapy  [facility]  Rehabilitation Occupational Therapy Daily Treatment Note    Date: 23  Patient Name: Jael Suazo       Room: D8Q-9590/7624-42  MRN: [id number]  Account: [de-identified]   : 1970  (50 y.o.) Gender: male        Assessment   Pt tolerated tx fair this session, limited by lethargy, eyes closed frequently during tx. Pt reporting no sleep last night. Pt on mat when tx started, just finished PT session. Pt needing max A, (hand over hand) to complete SPROM to L UE. Pt max A to roll to R side and sit up to edge of mat. Pt Max A x2 for sliding board transfers, mat to w/c and later, w/c to bed. Pt max x2 for sit>supine onto hospital bed at end of tx and to scoot up in bed. Pt required max cues, assist for visually scanning to L side (turn of head to L), with card matching task while seated in w/c, and freqeuntly closing eyes. Pt fast alseep after back in bed at end of tx. Cont poc to maximize function in ADL's, transfers, mob. Past Medical History:  has a past medical history of Hypertension and Pancreatitis. Past Surgical History:   has a past surgical history that includes Appendectomy. Restrictions  Restrictions/Precautions: Fall Risk, Aspiration Risk  Other position/activity restrictions: soft & bite sized diet w/ nectar thick liquids,  Equipment Used: Bed, Wheelchair    Subjective  Subjective: Pt met in dept, lying on mat, just finished PT. Pt lethargic, reports no sleep last night. Pt c/o \"itching where IV was\". Restrictions/Precautions: Fall Risk;Aspiration Risk        Objective     Cognition  Overall Cognitive Status: Exceptions  Following Commands:  Follows one step commands with repetition (lethargic)  Attention Span: Attends with cues to redirect  Memory: Decreased short term memory  Safety Judgement: Decreased awareness of need for assistance;Decreased awareness of need for safety  Problem Solving: Assistance required to cognition  Discharge Recommendations: Continue to assess pending progress; Patient would benefit from continued therapy after discharge  OT Equipment Recommendations  Other: TBD depending on DC disposition  Safety Devices  Safety Devices in place: Yes  Type of devices: Call light within reach; Left in bed;Bed alarm in place;Gait belt; All fall risk precautions in place;Nurse notified    Patient Education  Education  Education Given To: Patient  Education Provided: Transfer Training;Visual Perceptual Function;Cognition  Education Method: Demonstration;Verbal  Barriers to Learning: Cognition;Vision (L neglect)  Education Outcome: Continued education needed; Unable to demonstrate understanding    Plan  Occupational Therapy Plan  Times Per Week: 5-6  Times Per Day: Twice a day  Hours Per Day: 1.5 hours  Therapy Duration: 4 Weeks  Current Treatment Recommendations: Strengthening;ROM;Balance training;Functional mobility training; Endurance training; Wheelchair mobility training; Safety education & training;Patient/Caregiver education & training;Equipment evaluation, education, & procurement;Self-Care / ADL    Goals  Patient Goals   Patient goals : \"do what I normally do, walking outside\"  Short Term Goals  Time Frame for Short Term Goals: by 1-2 wks pt will complete  Short Term Goal 1: feeding & grooming tasks w/ SBA  Short Term Goal 2: UB dressing w/ mod A  Short Term Goal 3: LB dressing w/ mod A using A/E & amando tech  Short Term Goal 4: toilet tasks w/ Mod A  Short Term Goal 5: household transfers/SPT w/ mod A of 1  Long Term Goals  Time Frame for Long Term Goals : by 3-4 wks pt will complete  Long Term Goal 1: UB dressing w/ min A  Long Term Goal 2: LB dressing w/ Min A  Long Term Goal 3: bathing tasks w/ min A using LH sponge &  amando tech  Long Term Goal 4: toilet tasks w/ min A  Long Term Goal 5: w/c mobility x 50 ft w/ SBA using R UE & LE  Long Term Goal 6: participate in OT/PT co tx to address sitting & standing balance,

## 2023-09-21 NOTE — CARE COORDINATION
Chart Reviewed. MEt with patient who had a visitor, Douglas Burroughs, and pt gave me permission to say anything in front of his friend that he has known since [facility]. SOCIAL WORK ASSESSMENT      GOAL:   To return home to his niece's house      HOME SITUATION:  Pt lives with his niece and her three children aged 15year old twins and 15year old. He has 3 steps to enter and then he stays in the basement which is down 13 steps. There is no bathroom in basement; 1/2 bath on first floor and full bath on second floor. There are two small dogs in the home. He used no DME prior to admit but does have a cane and shower chair. He is an active . 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[address]:    [place] on [employer] or [employer] on [employer]        PRIOR LEVEL OF FUNCTIONING:       PERSONAL CARE:    totally independent                                                                       DRIVES:  yes                                                                     FINANCES:  independent                                                                   MEALS:  independent                               GROCERY SHOPS:  independent      DME CURRENTLY AT HOME:  shower chair, cane      CURRENT HOME CARE/SERVICES:  none. Informed him of possible post acute services such as home care or out patient services to continue his progress. PREFERRED HOME CARE:  To be determined      TEAM CONFERENCE DAY:  Tuesdays. Informed him of weekly Team Conferences where Team will review his progress, barriers, DME recs and DC date. This worker will return to give him this update weekly and plan for DC needs. LSW informed patient of preferred  time on date of discharge which is between 10 - 12 noon. LSW informed patient of recommendation for PCP visit within 7 days post discharge.     He denied having missed any appts or needs of daily living due to lack of

## 2023-09-21 NOTE — PROGRESS NOTES
Pt incontinent of urine. Pt did not want to continue using purwick. Pericare given and Depends and bedpad changed. Pt repositioned for comfort. Continues to c/o R neck pain/spasm. Pt medicated with Zanaflex per prn orders.

## 2023-09-21 NOTE — PROGRESS NOTES
Pt frequently yelling out. Pt requesting that this writer \" rub my arm. \" Explained that I will not rub his arm. Pt c/o itching to RUE. R FA wiped with a couple alcohol pads. Offered an ice pack and pt declined. Pt medicated with percocet 1 tab per prn orders.

## 2023-09-21 NOTE — PLAN OF CARE
Problem: Discharge Planning  Goal: Discharge to home or other facility with appropriate resources  Outcome: Progressing  Flowsheets (Taken 9/21/2023 2894)  Discharge to home or other facility with appropriate resources:   Identify barriers to discharge with patient and caregiver   Arrange for needed discharge resources and transportation as appropriate   Identify discharge learning needs (meds, wound care, etc)   Refer to discharge planning if patient needs post-hospital services based on physician order or complex needs related to functional status, cognitive ability or social support system     Problem: Pain  Goal: Verbalizes/displays adequate comfort level or baseline comfort level  Outcome: Progressing  Flowsheets (Taken 9/21/2023 0812)  Verbalizes/displays adequate comfort level or baseline comfort level:   Encourage patient to monitor pain and request assistance   Assess pain using appropriate pain scale   Administer analgesics based on type and severity of pain and evaluate response   Implement non-pharmacological measures as appropriate and evaluate response     Problem: Skin/Tissue Integrity  Goal: Absence of new skin breakdown  Description: 1. Monitor for areas of redness and/or skin breakdown  2. Assess vascular access sites hourly  3. Every 4-6 hours minimum:  Change oxygen saturation probe site  4. Every 4-6 hours:  If on nasal continuous positive airway pressure, respiratory therapy assess nares and determine need for appliance change or resting period.   Outcome: Progressing     Problem: ABCDS Injury Assessment  Goal: Absence of physical injury  Outcome: Progressing  Flowsheets (Taken 9/21/2023 0920)  Absence of Physical Injury: Implement safety measures based on patient assessment     Problem: Chronic Conditions and Co-morbidities  Goal: Patient's chronic conditions and co-morbidity symptoms are monitored and maintained or improved  Outcome: Progressing  Flowsheets (Taken 9/21/2023 0812)  Care Plan - Patient's Chronic Conditions and Co-Morbidity Symptoms are Monitored and Maintained or Improved: Monitor and assess patient's chronic conditions and comorbid symptoms for stability, deterioration, or improvement     Problem: Safety - Adult  Goal: Free from fall injury  Outcome: Progressing  Flowsheets (Taken 9/21/2023 0920)  Free From Fall Injury: Instruct family/caregiver on patient safety     Problem: Nutrition Deficit:  Goal: Optimize nutritional status  Outcome: Progressing

## 2023-09-21 NOTE — PROGRESS NOTES
SLP ALL NOTES  Facility/Department: [address] IP REHAB  Initial Speech/Language/Cognitive Assessment    NAME: Pina Toro  : 1970   MRN: [id number]  ADMISSION DATE: 2023  ADMITTING DIAGNOSIS: has Nontraumatic intracranial hemorrhage (HCC) on their problem list.   has a past medical history of Hypertension and Pancreatitis; NSTEMI ;HLD; CKD III  No known allergies  ONSET DATE: 2023       Date of Eval: 2023   Evaluating Therapist: WERO Figueroa    Chart Reviewed:   Per MD History and Physical Documentation   History of Present Illness/Hospital Course:  Patient is a 49 yo M with pmh NSTEMI () RBBB, HTN, HLD, CKD III, and chronic pancreatitis who initially presented to  on 2023 with acute onset left side weakness. He had been complaining of headache throughout the day prior to this. CT head revealed acute basal ganglia and right frontal lobe intraparenchymal hemorrhage w/ significant mass effect on the lateral ventricles R>L and leftward midline shift. CTA negative for vascular malformation. Systolic blood pressure was reportedly in the 250s on arrival. He received 500ml hypertonic saline, Keppra load, and was started on nicardipine gtt prior to being admitted to NSICU. He was started on PO HTN meds and weaned off nicardipine gttt on . Repeat [facility]  was stable. Course was complicated by dysphagia, difficult to control HTN. Now presents to ARU with impaired mobility, self-care, and cognition below his baseline. Currently, patient reports intermittent headache. He has severe left side weakness and decreased sensation left side. He has slurred speech. He has intermittent blurred vision. He is motivated to start inpatient rehabilitation program.          MRI Brain: 2023:   IMPRESSION:   1. No interval change in the right basal ganglia hemorrhage with unchanged surrounding edema and associated mass effect and midline shift. 2.  Mild small vessel disease.        Primary Severe  Multi step Commands: Severe  Complex/Abstract Commands: Severe  L/R Discrimination:  (minimal to marked)  Conversation:  (lethargy or attention deficits further exacerbate)    Reading Comprehension  Reading Status: Exceptions to [facility]  Scanning/Tracking Impairment Severity: Severe  Words Impairment Severity:  (left visual inattention impacts function for word lists/phrases/sentences.)  Effective Techniques: Large print    Expression  Primary Mode of Expression: Verbal  Verbal Expression  Verbal Expression: Exceptions to functional limits  Automatic Speech:  (intact)  Confrontation:  (intact for concrete items on confrontation)  Responsive:  (intact)  Conversation:  (lethargy impacts ; pt drifting to sleep and needs re-arousal. Pt conveying likes/dislikes.)    Written Expression  Dominant Hand: Right  Written Expression: Exceptions to [facility]  Self formulation Impairment Severity:  (able to convey biographical information but visual deficits impact functional use of forms etc)    Pragmatics/Social Functioning  Pragmatics: Exceptions to [facility]  Affect:  (flat affect; lethargy)  Eye Contact:  (poor overall ; some improvement when speaker on right of midline)  Topic Maintenance:  (poor)  Turn Taking:  (poor)    Cognition:   Orientation  Overall Orientation Status: Impaired (Temporal Orientation deficits)  Attention  Attention: Exceptions to [facility]  Selective Attention:  (0 to marked deficits; visual left attention)  Sustained Attention: Severe  Memory  Memory: Exceptions to [facility]  Daily Routines: Severe  Short-term Memory:  (mild to severe)  Working Memory: Severe  Problem Solving  [facility] Solving: Exceptions to [facility]  Simple Functional Tasks: Severe  Verbal Reasoning Skills: Moderate  Sequencing: Severe  Executive Function Skills: Severe  Numeric Reasoning  Numeric Reasoning: Exceptions to [facility]   Calculations:  Moderate  Time: Moderate  Abstract Reasoning  Abstract Reasoning: Exceptions to [facility]  Convergent Thinking:

## 2023-09-21 NOTE — PROGRESS NOTES
Pt somewhat awake, seems lethargic. Pain medication given at 1800. Pt does respond to name and answers simple questions appropriately. Pt c/o posterior neck and bilat eye pain. Explained that pain medication was not due until 2200. Pt also c/o purewick leaking. Pt incontinent of large amount of urine. Purewick removed and bedpad and depends changed after pericare given. New purewick system applied and pt stated, \" that feels good. \" This writer and other RN repositioned pt for comfort. Pt stated, \" I could wrap my leg around your waist.\" Pt also attempting to touch the back of this writers neck to explain where his pain is on himself. Pt admitted to ARU after ICH with L amando. L arm and leg are flaccid. Speech clear. Pt does have dysphagia and is on nectar liquids and bite sized foods. LUE elevated on pillow and L heel floated. Lungs clear. No sob or cough. On RA. Belly round and soft with active BS. LBM yesterday. No edema noted. Call light in reach. Bed alarm on.

## 2023-09-21 NOTE — PROGRESS NOTES
Physical Therapy  Facility/Department: [address] REHAB  Rehabilitation Physical Therapy Treatment Note    NAME: Patricia Adler  : 1970 (50 y.o.)  MRN: [id number]  CODE STATUS: Full Code    Date of Service: 23       Restrictions:  Restrictions/Precautions: Fall Risk, Aspiration Risk  Position Activity Restriction  Other position/activity restrictions: soft & bite sized diet w/ nectar thick liquids,     Pertinent medical information:  Additional Pertinent Hx: per Dr. Burch Bending - Patient is a 51 yo M with pmh NSTEMI () RBBB, HTN, HLD, CKD III, and chronic pancreatitis who initially presented to  on 2023 with acute onset left side weakness. He had been complaining of headache throughout the day prior to this. CT head revealed acute basal ganglia and right frontal lobe intraparenchymal hemorrhage w/ significant mass effect on the lateral ventricles R>L and leftward midline shift. CTA negative for vascular malformation. Systolic blood pressure was reportedly in the 250s on arrival. He received 500ml hypertonic saline, Keppra load, and was started on nicardipine gtt prior to being admitted to NSICU. He was started on PO HTN meds and weaned off nicardipine gttt on . Repeat [facility]  was stable. Course was complicated by dysphagia, difficult to control HTN. Now presents to ARU with impaired mobility, self-care, and cognition below his baseline. Currently, patient reports intermittent headache. He has severe left side weakness and decreased sensation left side. He has slurred speech. He has intermittent blurred vision. He is motivated to start inpatient rehabilitation program.       SUBJECTIVE  Subjective  Subjective: Patient to PT in high back reclining WC after SP. Needed to assist SP with positioning in WC prior to session - placed several blanket rolls on L side to limit tendency to lean to L side.   Eyes closed and falling asleep - reports not sleeping well - \"thought I got kidnapped

## 2023-09-21 NOTE — PROGRESS NOTES
Patient admitted to rehab with nontraumatic intracranial hemorrhage. A/Ox4. Transfers with berny plus x2. Mobility restrictions: flaccid left side. On soft and bite sized; nectar thick liquids diet, tolerating well. Medications taken whole with nectar thick. Skin: intact. Oxygen: RA. LDA: none. Has been occasionally incontinent of bowel and occasionally incontinent of bladder. LBM 9/19/23. Chair/bed alarms in use and call light in reach. Will monitor for safety.  Electronically signed by Lakesha Millan RN on 9/21/2023 at 11:26 AM n/a

## 2023-09-21 NOTE — PROGRESS NOTES
Pt continuing to yell out into hallway. Reminded pt of time and place and call light. Pt medicated with Melatonin per prn orders.

## 2023-09-21 NOTE — PROGRESS NOTES
Speech Therapy noted regarding SLP cognitive-communication and dysphagia treatment    SLP attempted to see 9/21/2023 at 1330 pm  Transport notified SLP of nsg attempt to get pt up for treatment session; but pt too lethargic at 1315 pm  SLP went to pt's room for 1330 pm treatment attempt  Pt asleep upon entry  Pt responded to auditory stimuli with brief eye opening and spontaneous verbalization. Pt drifted back to sleep  Pt responded to auditory/tactile stimulation with spontaneous right upper extremity movement and vocalization and drifted back to sleep  Unable to achieve sustained meaningful level of stimulus response    Treatment attempt was discontinued     Variance 30 minutes    SLP alerted MADHURI who has a scheduled appointment later pm    Signed  Lorena Villar,MS,CCC,SLP [id number]  Speech and Language Pathologist  9/21/2023 1414 pm.

## 2023-09-22 PROCEDURE — 92526 ORAL FUNCTION THERAPY: CPT

## 2023-09-22 PROCEDURE — 97129 THER IVNTJ 1ST 15 MIN: CPT

## 2023-09-22 PROCEDURE — 97535 SELF CARE MNGMENT TRAINING: CPT

## 2023-09-22 PROCEDURE — 97530 THERAPEUTIC ACTIVITIES: CPT

## 2023-09-22 PROCEDURE — 97112 NEUROMUSCULAR REEDUCATION: CPT

## 2023-09-22 PROCEDURE — 6370000000 HC RX 637 (ALT 250 FOR IP): Performed by: PHYSICAL MEDICINE & REHABILITATION

## 2023-09-22 PROCEDURE — 94760 N-INVAS EAR/PLS OXIMETRY 1: CPT

## 2023-09-22 PROCEDURE — 1280000000 HC REHAB R&B

## 2023-09-22 RX ADMIN — CLONIDINE HYDROCHLORIDE 0.3 MG: 0.1 TABLET ORAL at 20:23

## 2023-09-22 RX ADMIN — Medication 3 MG: at 20:25

## 2023-09-22 RX ADMIN — HYDRALAZINE HYDROCHLORIDE 100 MG: 50 TABLET, FILM COATED ORAL at 15:24

## 2023-09-22 RX ADMIN — FAMOTIDINE 20 MG: 20 TABLET ORAL at 20:23

## 2023-09-22 RX ADMIN — BUTALBITAL, ACETAMINOPHEN AND CAFFEINE 1 TABLET: 325; 50; 40 TABLET ORAL at 14:09

## 2023-09-22 RX ADMIN — LEVETIRACETAM 1000 MG: 500 TABLET, FILM COATED ORAL at 07:57

## 2023-09-22 RX ADMIN — CLONIDINE HYDROCHLORIDE 0.3 MG: 0.1 TABLET ORAL at 15:24

## 2023-09-22 RX ADMIN — VALSARTAN 160 MG: 160 TABLET ORAL at 07:55

## 2023-09-22 RX ADMIN — HYDRALAZINE HYDROCHLORIDE 100 MG: 50 TABLET, FILM COATED ORAL at 05:50

## 2023-09-22 RX ADMIN — BUTALBITAL, ACETAMINOPHEN AND CAFFEINE 1 TABLET: 325; 50; 40 TABLET ORAL at 20:24

## 2023-09-22 RX ADMIN — TIZANIDINE 2 MG: 4 TABLET ORAL at 07:50

## 2023-09-22 RX ADMIN — TIZANIDINE 2 MG: 4 TABLET ORAL at 19:59

## 2023-09-22 RX ADMIN — TRAMADOL HYDROCHLORIDE 50 MG: 50 TABLET ORAL at 03:07

## 2023-09-22 RX ADMIN — TRAMADOL HYDROCHLORIDE 50 MG: 50 TABLET ORAL at 21:35

## 2023-09-22 RX ADMIN — TRAMADOL HYDROCHLORIDE 50 MG: 50 TABLET ORAL at 15:24

## 2023-09-22 RX ADMIN — CARVEDILOL 25 MG: 25 TABLET, FILM COATED ORAL at 17:16

## 2023-09-22 RX ADMIN — SENNOSIDES AND DOCUSATE SODIUM 1 TABLET: 50; 8.6 TABLET ORAL at 20:24

## 2023-09-22 RX ADMIN — AMLODIPINE BESYLATE 10 MG: 10 TABLET ORAL at 07:55

## 2023-09-22 RX ADMIN — FAMOTIDINE 20 MG: 20 TABLET ORAL at 07:54

## 2023-09-22 RX ADMIN — BUTALBITAL, ACETAMINOPHEN AND CAFFEINE 1 TABLET: 325; 50; 40 TABLET ORAL at 05:49

## 2023-09-22 RX ADMIN — HYDRALAZINE HYDROCHLORIDE 100 MG: 50 TABLET, FILM COATED ORAL at 20:24

## 2023-09-22 RX ADMIN — ATORVASTATIN CALCIUM 40 MG: 40 TABLET, FILM COATED ORAL at 07:54

## 2023-09-22 RX ADMIN — SENNOSIDES AND DOCUSATE SODIUM 1 TABLET: 50; 8.6 TABLET ORAL at 07:56

## 2023-09-22 RX ADMIN — CARVEDILOL 25 MG: 25 TABLET, FILM COATED ORAL at 07:58

## 2023-09-22 RX ADMIN — CLONIDINE HYDROCHLORIDE 0.3 MG: 0.1 TABLET ORAL at 07:59

## 2023-09-22 RX ADMIN — ACETAMINOPHEN 650 MG: 325 TABLET ORAL at 19:51

## 2023-09-22 ASSESSMENT — PAIN SCALES - GENERAL
PAINLEVEL_OUTOF10: 8
PAINLEVEL_OUTOF10: 9
PAINLEVEL_OUTOF10: 0
PAINLEVEL_OUTOF10: 9
PAINLEVEL_OUTOF10: 0
PAINLEVEL_OUTOF10: 9
PAINLEVEL_OUTOF10: 0
PAINLEVEL_OUTOF10: 8

## 2023-09-22 ASSESSMENT — PAIN SCALES - WONG BAKER
WONGBAKER_NUMERICALRESPONSE: 0
WONGBAKER_NUMERICALRESPONSE: 2
WONGBAKER_NUMERICALRESPONSE: 0

## 2023-09-22 ASSESSMENT — PAIN DESCRIPTION - ORIENTATION
ORIENTATION: MID;LOWER
ORIENTATION: RIGHT
ORIENTATION: RIGHT;LEFT

## 2023-09-22 ASSESSMENT — PAIN DESCRIPTION - DESCRIPTORS
DESCRIPTORS: ACHING
DESCRIPTORS: ACHING
DESCRIPTORS: ACHING;DISCOMFORT
DESCRIPTORS: ACHING;DISCOMFORT
DESCRIPTORS: ACHING

## 2023-09-22 ASSESSMENT — PAIN DESCRIPTION - ONSET
ONSET: ON-GOING
ONSET: ON-GOING

## 2023-09-22 ASSESSMENT — PAIN DESCRIPTION - LOCATION
LOCATION: EYE;HEAD
LOCATION: HEAD
LOCATION: NECK
LOCATION: HEAD
LOCATION: HEAD

## 2023-09-22 ASSESSMENT — PAIN - FUNCTIONAL ASSESSMENT
PAIN_FUNCTIONAL_ASSESSMENT: PREVENTS OR INTERFERES WITH ALL ACTIVE AND SOME PASSIVE ACTIVITIES
PAIN_FUNCTIONAL_ASSESSMENT: PREVENTS OR INTERFERES SOME ACTIVE ACTIVITIES AND ADLS
PAIN_FUNCTIONAL_ASSESSMENT: PREVENTS OR INTERFERES SOME ACTIVE ACTIVITIES AND ADLS
PAIN_FUNCTIONAL_ASSESSMENT: PREVENTS OR INTERFERES WITH ALL ACTIVE AND SOME PASSIVE ACTIVITIES
PAIN_FUNCTIONAL_ASSESSMENT: ACTIVITIES ARE NOT PREVENTED
PAIN_FUNCTIONAL_ASSESSMENT: PREVENTS OR INTERFERES WITH MANY ACTIVE NOT PASSIVE ACTIVITIES
PAIN_FUNCTIONAL_ASSESSMENT: ACTIVITIES ARE NOT PREVENTED

## 2023-09-22 ASSESSMENT — PAIN DESCRIPTION - PAIN TYPE
TYPE: ACUTE PAIN
TYPE: ACUTE PAIN

## 2023-09-22 ASSESSMENT — PAIN DESCRIPTION - FREQUENCY
FREQUENCY: CONTINUOUS
FREQUENCY: CONTINUOUS

## 2023-09-22 NOTE — PROGRESS NOTES
assist  Long Term Goal 3: Demonstrate good static and dynamic balance with min assist.  Long Term Goal 4: Propel wheelchair 200ft with supervision for safety. Long Term Goal 5: Ambulate 10ft with RUE support on railing with one person assist and wheelchair follow. PLAN OF CARE/SAFETY  Physcial Therapy Plan  General Plan:  minutes of therapy at least 5 out of 7 days a week  Days Per Week: 5 Days  Therapy Duration: 4 Weeks  Current Treatment Recommendations: Strengthening;Balance training;Functional mobility training;Transfer training; Wheelchair mobility training; Endurance training;Gait training; Safety education & training;Patient/Caregiver education & training; Therapeutic activities; Neuromuscular re-education  Safety Devices  Type of Devices: All fall risk precautions in place; Patient at risk for falls; Bed alarm in place;Call light within reach;Gait belt;Left in bed;Nurse notified (RN, [facility], notified. K-pad on posterior neck and head secondary to pain.)    EDUCATION  Education  Education Given To: Patient  Education Provided: Role of Therapy;Plan of Care;Safety;Transfer Training;Mobility Training  Education Provided Comments: importance of therapy and need for improved alertness to improve  Education Method: Verbal;Demonstration; Other (Comment) (tactile and visual cues)  Barriers to Learning: Cognition  Education Outcome: Verbalized understanding;Continued education needed  Skilled Clinical Factors: decreased insight into deficits, L neglect        PM Session  Patient seen as co-treatment with OT for safety with completing weight bearing activities. Patient was asleep in bed upon arrival.  He did wake to name, but promptly closes his eyes. Patient was able to open his eyes wider, but continued to not keep them open. Patient reporting he had a headache that he rated as a 9/10. RN, Jordi Chery, gave patient his Fioricet as ordered. Supine>sit with max assist of 2.    Upon upright, it was noted that patient was very sweaty. Patient requesting to change his shirt. He was max assist to maintain upright at edge of bed and required step by step instruction along with assistance to DON shirt. Patient kept eyes closed majority of the time, but he did attempt to help with encouragement and cueing. Sit pivot transfer bed>wheelchair to the right with max assist of 2. Patient with poor trunk control and requires assistance to reposition hips so he does not fall to one side. [facility] with 3 person assist with foot plate removed. He continues to demonstrate poor trunk control and tries to put his head down on the platform of the berny plus (which is especially unsafe because of how tall he is). Patient was very focused on the therapy mat to his right and kept asking to go to it. Provided facilitation down trhough left LE in an attempt to elicit contraction, but noted no activation. Patient maintains majority of weight through right LE and required assistance from OT at trunk to maintain upright. On third trial of standing, patient attempted to take a step without warning and unweighted his right leg, in which his left leg just buckled as it is showing no trace contraction. Patient did not understand why he began to go down. Patient closing eyes and drifting to sleep when not being encouraged to participate in therapy and often not opening eyes. PT attempted music, tapping of leg, talking to the patient, encouraging, and a pack of ice on back and neck to keep the patient up, but patient could not maintain alertness. He stated he felt therapists were trying to get him. Patient was returned to room in wheelchair and transferred back to bed with sit pivot transfer to the right with max assist of 2. Sit>supine with max assist of 2. Patient was min assist x2 to scoot up in bed when assisted to grab the head board with his right UE and pull.     Patient's niece, Oliverio Wilson, was present with friends and asked questions regarding patient's progress. ZUNILDA reports she is concerned as his lethargy is not improving.       Safety Device - Type of devices:  [x]  All fall risk precautions in place [x] Bed alarm in place  [x] Call light within reach [] Chair alarm in place [] Positioning belt [x] Gait belt [] Patient at risk for falls [x] Left in bed [] Left in chair [] Telesitter in use [] Sitter present [] Nurse notified []  None        Therapy Time   Individual Concurrent Group Co-treatment   Time In 1030     1400   Time Out 1125     1445   Minutes 55     [address] [place] [address], [id number], 09/22/23 at 3:16 PM

## 2023-09-22 NOTE — PLAN OF CARE
Problem: Discharge Planning  Goal: Discharge to home or other facility with appropriate resources  9/22/2023 0040 by Gary Garcia RN  Outcome: Progressing  9/21/2023 1123 by Niurka Armstrong RN  Outcome: Progressing  Flowsheets (Taken 9/21/2023 6514)  Discharge to home or other facility with appropriate resources:   Identify barriers to discharge with patient and caregiver   Arrange for needed discharge resources and transportation as appropriate   Identify discharge learning needs (meds, wound care, etc)   Refer to discharge planning if patient needs post-hospital services based on physician order or complex needs related to functional status, cognitive ability or social support system     Problem: Pain  Goal: Verbalizes/displays adequate comfort level or baseline comfort level  9/22/2023 0040 by Gary Garcia RN  Outcome: Progressing  Flowsheets (Taken 9/21/2023 1737 by Niurka Armstrong RN)  Verbalizes/displays adequate comfort level or baseline comfort level:   Encourage patient to monitor pain and request assistance   Assess pain using appropriate pain scale   Administer analgesics based on type and severity of pain and evaluate response   Implement non-pharmacological measures as appropriate and evaluate response  9/21/2023 1123 by Niurka Armstrong RN  Outcome: Progressing  Flowsheets (Taken 9/21/2023 0543)  Verbalizes/displays adequate comfort level or baseline comfort level:   Encourage patient to monitor pain and request assistance   Assess pain using appropriate pain scale   Administer analgesics based on type and severity of pain and evaluate response   Implement non-pharmacological measures as appropriate and evaluate response     Problem: Skin/Tissue Integrity  Goal: Absence of new skin breakdown  Description: 1. Monitor for areas of redness and/or skin breakdown  2. Assess vascular access sites hourly  3. Every 4-6 hours minimum:  Change oxygen saturation probe site  4.   Every 4-6 hours:  If on nasal continuous positive airway pressure, respiratory therapy assess nares and determine need for appliance change or resting period.   9/22/2023 0040 by Can Gallagher RN  Outcome: Progressing  9/21/2023 1123 by Audi Finch RN  Outcome: Progressing     Problem: ABCDS Injury Assessment  Goal: Absence of physical injury  9/22/2023 0040 by Can Gallagher RN  Outcome: Progressing  9/21/2023 1123 by Audi Finch RN  Outcome: Progressing  Flowsheets (Taken 9/21/2023 0920)  Absence of Physical Injury: Implement safety measures based on patient assessment     Problem: Neurosensory - Adult  Goal: Achieves stable or improved neurological status  Outcome: Progressing  Goal: Achieves maximal functionality and self care  Outcome: Progressing     Problem: Skin/Tissue Integrity - Adult  Goal: Skin integrity remains intact  Outcome: Progressing     Problem: Musculoskeletal - Adult  Goal: Return mobility to safest level of function  Outcome: Progressing  Goal: Maintain proper alignment of affected body part  Outcome: Progressing  Goal: Return ADL status to a safe level of function  Outcome: Progressing     Problem: Infection - Adult  Goal: Absence of infection at discharge  Outcome: Progressing     Problem: Chronic Conditions and Co-morbidities  Goal: Patient's chronic conditions and co-morbidity symptoms are monitored and maintained or improved  9/22/2023 0040 by Can Gallagher RN  Outcome: Progressing  9/21/2023 1123 by Audi Finch RN  Outcome: Progressing  Flowsheets (Taken 9/21/2023 4397)  Care Plan - Patient's Chronic Conditions and Co-Morbidity Symptoms are Monitored and Maintained or Improved: Monitor and assess patient's chronic conditions and comorbid symptoms for stability, deterioration, or improvement     Problem: Safety - Adult  Goal: Free from fall injury  9/22/2023 0040 by Can Gallagher RN  Outcome: Progressing  9/21/2023 1123 by Audi Finch RN  Outcome: Progressing  Flowsheets (Taken 9/21/2023

## 2023-09-22 NOTE — PROGRESS NOTES
ACUTE REHAB UNIT  SPEECH/LANGUAGE PATHOLOGY      [x] Daily  [] Weekly Care Conference Note  [] Discharge    Patient:Jono Christianson      MTW:[birth year]  TQS:[id number]  Rehab Dx/Hx: Nontraumatic intracranial hemorrhage ([address]) [I62.9]    Precautions: Fall risk; left amando; visual deficits; cognitive deficits; dysphagia with aspiration risk  Home situation: Lives with family  ST Dx: [] Aphasia  [x] Dysarthria  [] Apraxia   [x] Oropharyngeal dysphagia [x] Cognitive Impairment  [] Other:   Initial Speech Therapy Assessment Diagnosis:   1. Cognitive Diagnosis: pt demonstrating mild to marked cognitive -linguistic deficits in domains of sustained/focused attention; left visual attention; insight/PS/VPS for safety ; working memory and STM; reasoning and math language. Pt with visual deficits characterized by poor attention to the left; left body neglect. Pt lethargy noted but pt was able to both physically / verbally actively participate in varied tasks. Flat affect and overall poor sustained visual attention even when right of midline. Problems wtih distractibility/task extinction; poor body awareness; safety awareness. Will initiate therapy and target attention; working memory and safety insight; and visual language function (using visual sscanning strategies for the latter)  2. Speech Diagnosis: Dysarthria features most symptomatic as pt fatigues with increase in phonemic distortions 2/2 to left om weakness which is exacerbated when fatigued. Pt is able to achieve audible/intelligible connected speech  3. Communication Diagnosis: Auditory processing is most optimal with concrete , short questions/statements and simple one step commands. Pt with attention/working memorya nd concern for complex auditory processing deficits. Visual language processing is markedly reduced due to visual inattention deficits and reduced pt insight.  Verbal expression is functional for conveying needs/wants; concrete confrontation naming and demonstrate improved swallow response via laryngeal/pharyngeal ex; sensory stimulatino for therapy trials of easyto chew food and/or thin liquids; AND demonstrate improved ease of bolus control for reduced severity /frequence of oral pocketing Targeted indirectly during po trials    [facility] with [facility] soft food consistencies: material removed via oral care (incomplete mastication; poor bolus formation and A-P oral transit; post swallow oral pocketing)   SLP unable to condition pt to laryngeal/pharyngeal ex requiring volitional /purposeful responses to models/instruction   OM speech and Cognitive-Linguistic Evaluation Goals: Pt goal it to go home and get some sleep. Pt was agreeable to treatment poc to address strategies for visual attention for functional reading/writing; attention/memory and VPS tasks     Therapy working toward pt goal Therapy working toward pt goal   Goal 1: Pt will demonstrate participation in ongoing assessment of visual spatial organization and strategies for functional concrete reading at phrase/sentence level and for concrete form completion wtih set up and max cues Visospatial org :   Not targeterd Too lethargic: max cues for eye opening and sustained eye opening beyond 30 seconds    Motor / verbal response more optimal than visual attention. But overall pt more lethargic than am session   Goal 2: Pt will demonstrate improved focused attention for rule application for >5 task trials without redirect to task instruction /rule application and/or sustain one step command rule application for 30 seconds wtih max set up and max verbal cues Visual attention:   Sustaine visual atteniton right of midline for both communication partner attention and when self feeding: >50%    Localization left of midline: max verbal/tactile cues and inconsistent actual eye trackong left of midline.      Sustained visual atteniton left : poor    Focused attention for >5 task trials: mild cues for ocmplettion    Focused

## 2023-09-22 NOTE — PLAN OF CARE
Problem: Discharge Planning  Goal: Discharge to home or other facility with appropriate resources  Outcome: Progressing  Flowsheets (Taken 9/22/2023 0745)  Discharge to home or other facility with appropriate resources:   Identify barriers to discharge with patient and caregiver   Arrange for needed discharge resources and transportation as appropriate   Identify discharge learning needs (meds, wound care, etc)   Refer to discharge planning if patient needs post-hospital services based on physician order or complex needs related to functional status, cognitive ability or social support system     Problem: Pain  Goal: Verbalizes/displays adequate comfort level or baseline comfort level  Outcome: Progressing  Flowsheets  Taken 9/22/2023 1515  Verbalizes/displays adequate comfort level or baseline comfort level:   Encourage patient to monitor pain and request assistance   Assess pain using appropriate pain scale   Administer analgesics based on type and severity of pain and evaluate response   Implement non-pharmacological measures as appropriate and evaluate response

## 2023-09-22 NOTE — PROGRESS NOTES
Occupational Therapy  [facility]  [facility] Occupational Therapy Daily Treatment Note    Date: 23  Patient Name: Sharif Contreras       Room: K6K-6543/8616-32  MRN: [id number]  Account: [de-identified]   : 1970  (50 y.o.) Gender: male         PM session: copied from PT's report for OT/PT co tx:    \"Patient seen as co-treatment with OT for safety with completing weight bearing activities. Patient was asleep in bed upon arrival.  He did wake to name, but promptly closes his eyes. Patient was able to open his eyes wider, but continued to not keep them open. Patient reporting he had a headache that he rated as a 9/10. RN, Eunice Sanchez, gave patient his Fioricet as ordered. Supine>sit with max assist of 2. Upon upright, it was noted that patient was very sweaty. Patient requesting to change his shirt. He was max assist to maintain upright at edge of bed and required step by step instruction along with assistance to DON shirt. Patient kept eyes closed majority of the time, but he did attempt to help with encouragement and cueing. Sit pivot transfer bed>wheelchair to the right with max assist of 2. Patient with poor trunk control and requires assistance to reposition hips so he does not fall to one side. Performed multiple stands in [facility] with 3 person assist with foot plate removed. He continues to demonstrate poor trunk control and tries to put his head down on the platform of the berny plus (which is especially unsafe because of how tall he is). Patient was very focused on the therapy mat to his right and kept asking to go to it. Provided facilitation down trhough left LE in an attempt to elicit contraction, but noted no activation. Patient maintains majority of weight through right LE and required assistance from OT at trunk to maintain upright.   On third trial of standing, patient attempted to take a step without warning and unweighted his right leg, in which

## 2023-09-22 NOTE — PROGRESS NOTES
Patient admitted to rehab with nontraumatic intracranial hemorrhage. A/Ox4. Transfers with berny plus x2. Mobility restrictions: flaccid left side. On soft and bite sized; nectar thick liquids diet, tolerating well. Medications taken whole with nectar thick. Skin: intact. Oxygen: RA. LDA: none. Has been occasionally incontinent of bowel and occasionally incontinent of bladder. Chair/bed alarms in use and call light in reach. Will monitor for safety.  Electronically signed by Amanda Castro RN on 9/22/2023 at 3:33 PM

## 2023-09-22 NOTE — FLOWSHEET NOTE
Patient admitted to ARU with Dx of non-traumatic ICH. A/Ox4. Confused at times. Denise Nipple out occasionally for \"Phillip\" but couldn't state who she is. (Daughter's name is Heather) Visitors tonight, left late. Transfers with berny plus x2. Mobility restrictions: flaccid left side. On soft and bite sized; nectar thick liquids diet, tolerating well. Medications taken whole with nectar thick. Needs to be monitored closely as he takes pills one at a time as he drops them easily. Needs to be cued to swallow pills with thickened liquid. Skin: intact. Oxygen: RA. LDA: none. Has been occasionally incontinent of bowel and occasionally incontinent of bladder. LBM 9/19/23. Chair/bed alarms in use and call light in reach. Will monitor for safety. Continue to monitor for comfort.

## 2023-09-23 PROCEDURE — 94760 N-INVAS EAR/PLS OXIMETRY 1: CPT

## 2023-09-23 PROCEDURE — 97530 THERAPEUTIC ACTIVITIES: CPT

## 2023-09-23 PROCEDURE — 6370000000 HC RX 637 (ALT 250 FOR IP): Performed by: PHYSICAL MEDICINE & REHABILITATION

## 2023-09-23 PROCEDURE — 97129 THER IVNTJ 1ST 15 MIN: CPT

## 2023-09-23 PROCEDURE — 97112 NEUROMUSCULAR REEDUCATION: CPT

## 2023-09-23 PROCEDURE — 97110 THERAPEUTIC EXERCISES: CPT

## 2023-09-23 PROCEDURE — 92526 ORAL FUNCTION THERAPY: CPT

## 2023-09-23 PROCEDURE — 97535 SELF CARE MNGMENT TRAINING: CPT

## 2023-09-23 PROCEDURE — 1280000000 HC REHAB R&B

## 2023-09-23 RX ADMIN — HYDRALAZINE HYDROCHLORIDE 100 MG: 50 TABLET, FILM COATED ORAL at 05:33

## 2023-09-23 RX ADMIN — HYDRALAZINE HYDROCHLORIDE 100 MG: 50 TABLET, FILM COATED ORAL at 20:08

## 2023-09-23 RX ADMIN — CLONIDINE HYDROCHLORIDE 0.3 MG: 0.1 TABLET ORAL at 13:49

## 2023-09-23 RX ADMIN — CLONIDINE HYDROCHLORIDE 0.3 MG: 0.1 TABLET ORAL at 08:13

## 2023-09-23 RX ADMIN — SENNOSIDES AND DOCUSATE SODIUM 1 TABLET: 50; 8.6 TABLET ORAL at 08:13

## 2023-09-23 RX ADMIN — HYDRALAZINE HYDROCHLORIDE 100 MG: 50 TABLET, FILM COATED ORAL at 13:49

## 2023-09-23 RX ADMIN — CLONIDINE HYDROCHLORIDE 0.3 MG: 0.1 TABLET ORAL at 20:07

## 2023-09-23 RX ADMIN — SENNOSIDES AND DOCUSATE SODIUM 1 TABLET: 50; 8.6 TABLET ORAL at 20:07

## 2023-09-23 RX ADMIN — TIZANIDINE 2 MG: 4 TABLET ORAL at 16:01

## 2023-09-23 RX ADMIN — TRAMADOL HYDROCHLORIDE 50 MG: 50 TABLET ORAL at 19:02

## 2023-09-23 RX ADMIN — TRAMADOL HYDROCHLORIDE 50 MG: 50 TABLET ORAL at 12:50

## 2023-09-23 RX ADMIN — ACETAMINOPHEN 650 MG: 325 TABLET ORAL at 15:39

## 2023-09-23 RX ADMIN — FAMOTIDINE 20 MG: 20 TABLET ORAL at 08:12

## 2023-09-23 RX ADMIN — VALSARTAN 160 MG: 160 TABLET ORAL at 09:19

## 2023-09-23 RX ADMIN — FAMOTIDINE 20 MG: 20 TABLET ORAL at 20:07

## 2023-09-23 RX ADMIN — BUTALBITAL, ACETAMINOPHEN AND CAFFEINE 1 TABLET: 325; 50; 40 TABLET ORAL at 14:04

## 2023-09-23 RX ADMIN — BUTALBITAL, ACETAMINOPHEN AND CAFFEINE 1 TABLET: 325; 50; 40 TABLET ORAL at 05:39

## 2023-09-23 RX ADMIN — ATORVASTATIN CALCIUM 40 MG: 40 TABLET, FILM COATED ORAL at 08:12

## 2023-09-23 RX ADMIN — CARVEDILOL 25 MG: 25 TABLET, FILM COATED ORAL at 16:01

## 2023-09-23 RX ADMIN — AMLODIPINE BESYLATE 10 MG: 10 TABLET ORAL at 08:13

## 2023-09-23 ASSESSMENT — PAIN DESCRIPTION - DESCRIPTORS
DESCRIPTORS: ACHING

## 2023-09-23 ASSESSMENT — PAIN SCALES - GENERAL
PAINLEVEL_OUTOF10: 6
PAINLEVEL_OUTOF10: 0
PAINLEVEL_OUTOF10: 8
PAINLEVEL_OUTOF10: 0
PAINLEVEL_OUTOF10: 8
PAINLEVEL_OUTOF10: 0
PAINLEVEL_OUTOF10: 6
PAINLEVEL_OUTOF10: 2
PAINLEVEL_OUTOF10: 3
PAINLEVEL_OUTOF10: 9
PAINLEVEL_OUTOF10: 9
PAINLEVEL_OUTOF10: 0

## 2023-09-23 ASSESSMENT — PAIN DESCRIPTION - PAIN TYPE: TYPE: ACUTE PAIN

## 2023-09-23 ASSESSMENT — PAIN - FUNCTIONAL ASSESSMENT
PAIN_FUNCTIONAL_ASSESSMENT: PREVENTS OR INTERFERES SOME ACTIVE ACTIVITIES AND ADLS
PAIN_FUNCTIONAL_ASSESSMENT: PREVENTS OR INTERFERES WITH MANY ACTIVE NOT PASSIVE ACTIVITIES

## 2023-09-23 ASSESSMENT — PAIN DESCRIPTION - LOCATION
LOCATION: BACK
LOCATION: HEAD
LOCATION: BACK
LOCATION: EYE;HEAD
LOCATION: HEAD
LOCATION: BACK

## 2023-09-23 ASSESSMENT — PAIN DESCRIPTION - ORIENTATION
ORIENTATION: MID
ORIENTATION: RIGHT
ORIENTATION: MID;LOWER
ORIENTATION: LOWER
ORIENTATION: RIGHT
ORIENTATION: LOWER

## 2023-09-23 ASSESSMENT — PAIN SCALES - WONG BAKER
WONGBAKER_NUMERICALRESPONSE: 0

## 2023-09-23 NOTE — PROGRESS NOTES
Patient had small incontience of urine and a smear of stool, bed bath with wipes given, new gown and linens given. Warm blanket to promote rest - cold washcloth and dimmed lights for headache (PRN) already given. Patient was also helped with lotion for legs and feet. Patient now resting in bed. RR >12.     Electronically signed by Harjinder Brandt RN on 9/23/2023 at 3:11 PM

## 2023-09-23 NOTE — PLAN OF CARE
Problem: Discharge Planning  Goal: Discharge to home or other facility with appropriate resources  9/23/2023 1053 by Sigifredo Valenzuela RN  Outcome: Progressing     Problem: Pain  Goal: Verbalizes/displays adequate comfort level or baseline comfort level  9/23/2023 1053 by Sigifredo Valenzuela RN  Outcome: Progressing     Problem: Skin/Tissue Integrity  Goal: Absence of new skin breakdown  Description: 1. Monitor for areas of redness and/or skin breakdown  2. Assess vascular access sites hourly  3. Every 4-6 hours minimum:  Change oxygen saturation probe site  4. Every 4-6 hours:  If on nasal continuous positive airway pressure, respiratory therapy assess nares and determine need for appliance change or resting period.   9/23/2023 1053 by Sigifredo Valenzuela RN  Outcome: Progressing     Problem: ABCDS Injury Assessment  Goal: Absence of physical injury  9/23/2023 1053 by Sigifredo Valenzuela RN  Outcome: Progressing     Problem: Neurosensory - Adult  Goal: Achieves stable or improved neurological status  9/23/2023 1053 by Sigifredo Valenzuela RN  Outcome: Progressing     Problem: Neurosensory - Adult  Goal: Achieves maximal functionality and self care  9/23/2023 1053 by Sigifredo Valenzuela RN  Outcome: Progressing     Problem: Skin/Tissue Integrity - Adult  Goal: Skin integrity remains intact  9/23/2023 1053 by Sigifredo Valenzuela RN  Outcome: Progressing     Problem: Musculoskeletal - Adult  Goal: Return mobility to safest level of function  9/23/2023 1053 by Sigifredo Valenzuela RN  Outcome: Progressing     Problem: Musculoskeletal - Adult  Goal: Maintain proper alignment of affected body part  9/23/2023 1053 by Sigifredo Valenzuela RN  Outcome: Progressing     Problem: Musculoskeletal - Adult  Goal: Return ADL status to a safe level of function  9/23/2023 1053 by Sigifredo Valenzuela RN  Outcome: Progressing     Problem: Infection - Adult  Goal: Absence of infection at discharge  9/23/2023 1053 by Sigifredo Valenzuela RN  Outcome: Progressing

## 2023-09-23 NOTE — PROGRESS NOTES
Physical Therapy  Facility/Department: [address] REHAB  Rehabilitation Physical Therapy Treatment Note    NAME: Brielle Love  : 1970 (50 y.o.)  MRN: [id number]  CODE STATUS: Full Code    Date of Service: 23       Restrictions:  Restrictions/Precautions: Fall Risk, Aspiration Risk  Position Activity Restriction  Other position/activity restrictions: soft & bite sized diet w/ nectar thick liquids,     SUBJECTIVE  Subjective  Subjective: Patient supine in bed upon arrival, pt is very lethargic and requires maximum verbal and tactile cues to stay awake. Pt has no reports of pain, he does report he is very tired and could sleep all day. Pain: Patient did ask for pain medication but did not report any pain. Post Treatment Pain Screening         OBJECTIVE  Cognition  Overall Cognitive Status: Exceptions  Following Commands: Follows one step commands consistently  Attention Span: Attends with cues to redirect  Memory: Decreased short term memory  Safety Judgement: Decreased awareness of need for assistance;Decreased awareness of need for safety  Problem Solving: Assistance required to generate solutions  Insights: Not aware of deficits  Initiation: Requires cues for all  Sequencing: Requires cues for all  Cognition Comment: pt more groggy/lethargic -  decreased attention with cues to maintain attention and stay on tasks, L neglect, slow processing/responding,mild slurred speech & word finding issues  Orientation  Overall Orientation Status: Within Functional Limits    Functional Mobility  Bed Mobility  Overall Assistance Level: Requires x 2 Assistance;Maximum Assistance  Additional Factors: Verbal cues; Set-up; Increased time to complete; Head of bed raised (HOB slightly elevated)  Bridging  Assistance Level: Maximum assistance  Skilled Clinical Factors: required assist to get LLE into position and assist to perform bridge  Roll Left  Assistance Level: Maximum assistance  Roll Right  Assistance Level: Maximum assistance  Supine to Sit  Assistance Level: Maximum assistance; Requires x 2 assistance  Skilled Clinical Factors: Patient required assist to bring BLE's off the edge of the bed and assist to come up  at his trunk. Scooting  Assistance Level: Maximum assistance  Balance  Sitting Balance: Moderate assistance  Standing Balance  Activity: While seated on the edge of the bed, pt performed static seated balance. He displayed difficulty remaining awake and required frequent cues to do so. Pt with severe lean to the left and required steady physical assist to maintain upright posture. Patient was better able to attend to cues with proper motivation, he would stay more alert for his friend Devere Dubin, and when bribed with some nectar thick coffee. Sit to Stand  Assistance Level: Dependent; Requires x 2 assistance  Skilled Clinical Factors: use of berny stedy with assist of 2. Pt tends to push with right side and collapse to left side without physical support. Max A was required throughout transfer. Stand to Sit  Assistance Level: Dependent; Requires x 2 assistance  Skilled Clinical Factors: in berny stedy      Environmental Mobility  Ambulation  Surface:  (not safe for ambulation at this time, will continue to work on sitting balance with progression to standing balance.)  Wheelchair  Surface:  (did not perform w/c mobility today)      Neuromuscular Education  NDT Treatment: Lower extremity  Facilitation techniques: In supine/hooklying, pt required assist to maintain LLE position and assist to readjust RLE. I manually resisted right hip adductors to facilitate left hip adduction, pt is able to initiate but requires assist to complete movement. Assisted pt in performing bridge, assist to maintain LLE position and lift bottom off the bed. Neuromuscular Comments: Participation limited due to lethargy, repeated verbal and tactile cues.       PT Exercises  Exercise Treatment: Pt sitting in b/s chair with OT facilitating LUE movement to reach for balloon. Pt did reach for balloon with RUE and popped it. So we performed a few reps with patient reaching to grab cones, he tired of this quickly, so switched it up again and had patient reach to grab nectar thick coffee and take a drink, he did well with this but required some guidance at his trunk and assist to maintain his head in a neutral position. ASSESSMENT/PROGRESS TOWARDS GOALS       Assessment  Assessment: Mr. \"Richard\" Gege's alertness has slightly improved according to his family today, but is still very limited. He requires constant verbal and tactile cueing to stay awake and he would not keep eyes open for longer than 5 seconds at time. He still requires two person assist to transfer utilizing [facility]. He requires max-min assist to maintain sitting balance without back support. Patient continues to demonstrate left neglect and very poor insight into his deficits. Patient continues to function well below his baseline and will benefit from continued skilled PT to address impaired cognition, balance, strength, ROM, functional mobility, and left side neglect. Activity Tolerance: Patient limited by fatigue;Patient limited by endurance  Discharge Recommendations: Continue to assess pending progress; Patient would benefit from continued therapy after discharge; Therapy recommended at discharge  PT Equipment Recommendations  Other: continue to assess pending patient progress    Goals  Patient Goals   Patient Goals : \" get back to skating\"  Short Term Goals  Time Frame for Short Term Goals: 2 weeks  Short Term Goal 1: Perform bed mobility with max x 1 assist.  Short Term Goal 2: Perform transfers with mod x 1  Short Term Goal 3: Demonstrate good static sitting balance with supervision  Short Term Goal 4: Demonstrate good static standing balance with mod x 1 with UE support  Short Term Goal 5: Propel wheelchair 150ft with supervision for safety.   Long Term

## 2023-09-23 NOTE — PLAN OF CARE
Problem: Discharge Planning  Goal: Discharge to home or other facility with appropriate resources  9/23/2023 0022 by Marc Kendall RN  Outcome: Progressing  9/23/2023 0021 by Marc Kendall RN  Outcome: Progressing  9/23/2023 0021 by Marc Kendall RN  Outcome: Progressing  9/22/2023 1532 by Margi Bobby RN  Outcome: Progressing  Flowsheets (Taken 9/22/2023 0745)  Discharge to home or other facility with appropriate resources:   Identify barriers to discharge with patient and caregiver   Arrange for needed discharge resources and transportation as appropriate   Identify discharge learning needs (meds, wound care, etc)   Refer to discharge planning if patient needs post-hospital services based on physician order or complex needs related to functional status, cognitive ability or social support system     Problem: Pain  Goal: Verbalizes/displays adequate comfort level or baseline comfort level  9/23/2023 0022 by Marc Kendall RN  Outcome: Progressing  9/23/2023 0021 by Marc Kendall RN  Outcome: Progressing  9/23/2023 0021 by Marc Kendall RN  Outcome: Progressing  9/22/2023 1532 by Margi Bobby RN  Outcome: Progressing  Flowsheets  Taken 9/22/2023 1515  Verbalizes/displays adequate comfort level or baseline comfort level:   Encourage patient to monitor pain and request assistance   Assess pain using appropriate pain scale   Administer analgesics based on type and severity of pain and evaluate response   Implement non-pharmacological measures as appropriate and evaluate response  Taken 9/22/2023 0745  Verbalizes/displays adequate comfort level or baseline comfort level:   Encourage patient to monitor pain and request assistance   Assess pain using appropriate pain scale   Administer analgesics based on type and severity of pain and evaluate response   Implement non-pharmacological measures as appropriate and evaluate response     Problem: Skin/Tissue Integrity  Goal: Absence of new skin breakdown  Description: 1. Monitor for areas of redness and/or skin breakdown  2. Assess vascular access sites hourly  3. Every 4-6 hours minimum:  Change oxygen saturation probe site  4. Every 4-6 hours:  If on nasal continuous positive airway pressure, respiratory therapy assess nares and determine need for appliance change or resting period.   9/23/2023 0022 by Terryl Ahumada, RN  Outcome: Progressing  9/23/2023 0021 by Terryl Ahumada, RN  Outcome: Progressing  9/23/2023 0021 by Terryl Ahumada, RN  Outcome: Progressing  9/22/2023 1532 by Héctor Razo RN  Outcome: Progressing     Problem: ABCDS Injury Assessment  Goal: Absence of physical injury  9/23/2023 0022 by Terryl Ahumada, RN  Outcome: Progressing  9/23/2023 0021 by Terryl Ahumada, RN  Outcome: Progressing  9/23/2023 0021 by Terryl Ahumada, RN  Outcome: Progressing  9/22/2023 1532 by Héctor Razo RN  Outcome: Progressing  Flowsheets (Taken 9/22/2023 1143)  Absence of Physical Injury: Implement safety measures based on patient assessment     Problem: Neurosensory - Adult  Goal: Achieves stable or improved neurological status  9/23/2023 0022 by Terryl Ahumada, RN  Outcome: Progressing  9/23/2023 0021 by Terryl Ahumada, RN  Outcome: Progressing  Goal: Achieves maximal functionality and self care  9/23/2023 0022 by Terryl Ahumada, RN  Outcome: Progressing  9/23/2023 0021 by Terryl Ahumada, RN  Outcome: Progressing     Problem: Skin/Tissue Integrity - Adult  Goal: Skin integrity remains intact  9/23/2023 0022 by Terryl Ahumada, RN  Outcome: Progressing  9/23/2023 0021 by Terryl Ahumada, RN  Outcome: Progressing  Flowsheets (Taken 9/22/2023 1143 by Héctor Razo RN)  Skin Integrity Remains Intact: Monitor for areas of redness and/or skin breakdown     Problem: Musculoskeletal - Adult  Goal: Return mobility to safest level of function  9/23/2023 0022 by Terryl Ahumada, RN  Outcome: Progressing  9/23/2023 0021 by Terryl Ahumada, RN  Outcome: Progressing  Goal:

## 2023-09-23 NOTE — PROGRESS NOTES
Patients report doing well this morning, patient was very appropriate for this RN - was able to take all morning medications, one at a time with thicken liquids. Patient and his niece Glendy Leon) were updated on patient POC - with permission from patient. Patient now working with PT/OT. All needs meet at this time, VSS on RA. Will continue with POC.      Electronically signed by Gina Jimenez RN on 9/23/2023 at 10:55 AM

## 2023-09-23 NOTE — PROGRESS NOTES
ACUTE REHAB UNIT  SPEECH/LANGUAGE PATHOLOGY      [x] Daily  [] Weekly Care Conference Note  [] Discharge    Patient:Jono Delvalle      RUB:  [facility]  Rehab Dx/Hx: Nontraumatic intracranial hemorrhage ([address]) [I62.9]    Precautions: Fall risk; left amando; visual deficits; cognitive deficits; dysphagia with aspiration risk  Home situation: Lives with family  ST Dx: [] Aphasia  [x] Dysarthria  [] Apraxia   [x] Oropharyngeal dysphagia [x] Cognitive Impairment  [] Other:   Initial Speech Therapy Assessment Diagnosis:   1. Cognitive Diagnosis: pt demonstrating mild to marked cognitive -linguistic deficits in domains of sustained/focused attention; left visual attention; insight/PS/VPS for safety ; working memory and STM; reasoning and math language. Pt with visual deficits characterized by poor attention to the left; left body neglect. Pt lethargy noted but pt was able to both physically / verbally actively participate in varied tasks. Flat affect and overall poor sustained visual attention even when right of midline. Problems wtih distractibility/task extinction; poor body awareness; safety awareness. Will initiate therapy and target attention; working memory and safety insight; and visual language function (using visual sscanning strategies for the latter)  2. Speech Diagnosis: Dysarthria features most symptomatic as pt fatigues with increase in phonemic distortions 2/2 to left om weakness which is exacerbated when fatigued. Pt is able to achieve audible/intelligible connected speech  3. Communication Diagnosis: Auditory processing is most optimal with concrete , short questions/statements and simple one step commands. Pt with attention/working memorya nd concern for complex auditory processing deficits. Visual language processing is markedly reduced due to visual inattention deficits and reduced pt insight.  Verbal expression is functional for conveying needs/wants; concrete confrontation naming and Reasoning for this reviewed  Practiced with pt's children for L attention in conversation. Cues to keep eyes open and to move eye to L, not just head. Visual attention:   Max cues      Goal 3: Pt will demonstrate improved insight for  following right/left spatial one step command >90% with max cues   Not addressed this date. Not addressed this date. Goal 4: Pt will maintain audible/intelligible connected speech with >90% aritculatory precision with use of compensatory strategies PRN     Audible/intelligible connected speech. Low volume at times. Better volume with family and friend visiting. Audible/intelligible connected speech with minimal phonemic distortions which may be more related to exacerbation of lethargy    Other areas targeted:     Education:   Ongoing pt ed    Safety Devices: [x] Call light within reach  [x] Chair alarm activated  [] Bed alarm activated  [x] Other: Notified RN of fruit yogurt pt took during session (100% of serving size;  [x] Call light within reach  [x] Chair alarm activated  [] Bed alarm activated  [x] Other:visitors with pt   Progress Assessment: 9 /22/2023: Pt more alert in am session. In the pm session pt was more lethargic with increase confused verbalizations   Plan: Continue as per plan of care.    Continued Tx Upon Discharge: ?    [x] Yes [] No [] TBD based on progress while on ARU [] Vital Stim indicated [] Other:   Estimated discharge date: TBD   Discharge recommendations:   [] Home independently  [] Home with assistance []  24 hour supervision  [] ECF [x] Other: TBD     Additional information:     Interventions used during Rehab Stay:  [] Speech/Language Treatment  [] Instruction in HEP [] Group [x] Dysphagia Treatment [x] Cognitive Treatment   [] Other:    Electronically Signed by    Juvenal Rodriguez MS CCC/SLP [id number]  Speech Language Pathologist  09/23/23

## 2023-09-23 NOTE — FLOWSHEET NOTE
Patient admitted to ARU on 9/19 for debility after being diagnosed with non-traumatic ICH. He stated that he was at the store when he suddenly felt left-sided weakness and headache. A/Ox4. Confused at times. Jonathan Ackermanns out often for family members such as his daughter and stepmother and niece. Family visited tonight including his brother. He did not eat all day according to visitors. Supervisor Eva Singh brought up Ensure, bananas and pudding after dietary didn't deliver what was ordered by the day nurse. Patient complained he was hot, thermostat adjusted, ice pack given. Moose Cottrell brought in. No fever. Transfers with berny plus x2. Mobility restrictions: flaccid left side. On soft and bite sized; nectar thick liquids diet, tolerating well. Medications taken whole with nectar thick. Needs to be monitored closely as he takes pills one at a time as he drops them easily. Needs to be cued to swallow pills with thickened liquid. Skin: intact. Oxygen: RA. LDA: none. Has been occasionally incontinent of bowel and occasionally incontinent of bladder. LBM 9/19/23. Chair/bed alarms in use and call light in reach. Will monitor for safety. Continue to monitor for comfort. Given his prn medications including melatonin, tylenol, Fioricet, Zanaflex and Tramadol. Patient is incontinent of urine and needs to be checked and changed. Patient fell asleep as soon as family left.

## 2023-09-23 NOTE — PROGRESS NOTES
Occupational Therapy  Facility/Department: [facility]  Rehabilitation Occupational Therapy Daily Treatment Note    Date: 23  Patient Name: Ronaldo Skinner       Room: Q9E-9443/1426-65  MRN: [id number]  Account: [de-identified]   : 1970  (50 y.o.) Gender: male                    Past Medical History:  has a past medical history of CKD (chronic kidney disease), Headache, HLD (hyperlipidemia), Hypertension, Left-sided weakness, NSTEMI (non-ST elevated myocardial infarction) ([address]), and Pancreatitis. Past Surgical History:   has a past surgical history that includes Appendectomy. Restrictions  Restrictions/Precautions: Fall Risk, Aspiration Risk  Other position/activity restrictions: soft & bite sized diet w/ nectar thick liquids,  Equipment Used: Bed, Wheelchair    Subjective  Subjective: met in room, pt in bed, friend Saba Tinoco is present on his L side  Restrictions/Precautions: Fall Risk;Aspiration Risk             Objective  Vision - Basic Assessment  Patient Visual Report: Balance difficulty; Difficulty maintaining concentration with focus; Unable to keep objects in focus; Eye fatigue/eye pain/headache;Other (add comment)  Cognition  Overall Cognitive Status: Exceptions  Following Commands:  Follows one step commands consistently  Attention Span: Attends with cues to redirect  Memory: Decreased short term memory  Safety Judgement: Decreased awareness of need for assistance;Decreased awareness of need for safety  Problem Solving: Assistance required to generate solutions  Insights: Not aware of deficits  Initiation: Requires cues for all  Sequencing: Requires cues for all  Cognition Comment: pt more groggy/lethargic -  decreased attention with cues to maintain attention and stay on tasks, L neglect, slow processing/responding,mild slurred speech & word finding issues  Orientation  Overall Orientation Status: Within Functional Limits   Perception  Overall Perceptual Status: Impaired  Unilateral

## 2023-09-24 VITALS
TEMPERATURE: 97.9 F | RESPIRATION RATE: 16 BRPM | HEIGHT: 72 IN | WEIGHT: 197.31 LBS | OXYGEN SATURATION: 96 % | DIASTOLIC BLOOD PRESSURE: 80 MMHG | BODY MASS INDEX: 26.73 KG/M2 | HEART RATE: 50 BPM | SYSTOLIC BLOOD PRESSURE: 133 MMHG

## 2023-09-24 PROCEDURE — 94760 N-INVAS EAR/PLS OXIMETRY 1: CPT

## 2023-09-24 PROCEDURE — 6370000000 HC RX 637 (ALT 250 FOR IP): Performed by: PHYSICAL MEDICINE & REHABILITATION

## 2023-09-24 PROCEDURE — 1280000000 HC REHAB R&B

## 2023-09-24 RX ADMIN — ATORVASTATIN CALCIUM 40 MG: 40 TABLET, FILM COATED ORAL at 08:51

## 2023-09-24 RX ADMIN — SENNOSIDES AND DOCUSATE SODIUM 1 TABLET: 50; 8.6 TABLET ORAL at 20:21

## 2023-09-24 RX ADMIN — CARVEDILOL 25 MG: 25 TABLET, FILM COATED ORAL at 08:51

## 2023-09-24 RX ADMIN — CLONIDINE HYDROCHLORIDE 0.3 MG: 0.1 TABLET ORAL at 08:51

## 2023-09-24 RX ADMIN — CLONIDINE HYDROCHLORIDE 0.3 MG: 0.1 TABLET ORAL at 20:21

## 2023-09-24 RX ADMIN — Medication 3 MG: at 20:22

## 2023-09-24 RX ADMIN — CLONIDINE HYDROCHLORIDE 0.3 MG: 0.1 TABLET ORAL at 14:33

## 2023-09-24 RX ADMIN — SENNOSIDES AND DOCUSATE SODIUM 1 TABLET: 50; 8.6 TABLET ORAL at 08:51

## 2023-09-24 RX ADMIN — TRAMADOL HYDROCHLORIDE 50 MG: 50 TABLET ORAL at 23:42

## 2023-09-24 RX ADMIN — FAMOTIDINE 20 MG: 20 TABLET ORAL at 20:21

## 2023-09-24 RX ADMIN — TRAMADOL HYDROCHLORIDE 50 MG: 50 TABLET ORAL at 05:42

## 2023-09-24 RX ADMIN — TRAMADOL HYDROCHLORIDE 50 MG: 50 TABLET ORAL at 14:33

## 2023-09-24 RX ADMIN — CARVEDILOL 25 MG: 25 TABLET, FILM COATED ORAL at 17:30

## 2023-09-24 RX ADMIN — HYDRALAZINE HYDROCHLORIDE 100 MG: 50 TABLET, FILM COATED ORAL at 05:38

## 2023-09-24 RX ADMIN — HYDRALAZINE HYDROCHLORIDE 100 MG: 50 TABLET, FILM COATED ORAL at 22:03

## 2023-09-24 RX ADMIN — AMLODIPINE BESYLATE 10 MG: 10 TABLET ORAL at 08:51

## 2023-09-24 RX ADMIN — FAMOTIDINE 20 MG: 20 TABLET ORAL at 08:51

## 2023-09-24 RX ADMIN — VALSARTAN 160 MG: 160 TABLET ORAL at 08:51

## 2023-09-24 RX ADMIN — POLYETHYLENE GLYCOL 3350 17 G: 17 POWDER, FOR SOLUTION ORAL at 18:08

## 2023-09-24 RX ADMIN — ACETAMINOPHEN 650 MG: 325 TABLET ORAL at 20:21

## 2023-09-24 RX ADMIN — POLYETHYLENE GLYCOL 3350 17 G: 17 POWDER, FOR SOLUTION ORAL at 05:42

## 2023-09-24 ASSESSMENT — PAIN DESCRIPTION - ORIENTATION
ORIENTATION: POSTERIOR
ORIENTATION: RIGHT
ORIENTATION: LOWER;ANTERIOR

## 2023-09-24 ASSESSMENT — PAIN SCALES - GENERAL
PAINLEVEL_OUTOF10: 6
PAINLEVEL_OUTOF10: 8
PAINLEVEL_OUTOF10: 7
PAINLEVEL_OUTOF10: 3
PAINLEVEL_OUTOF10: 9
PAINLEVEL_OUTOF10: 7

## 2023-09-24 ASSESSMENT — PAIN DESCRIPTION - FREQUENCY
FREQUENCY: CONTINUOUS
FREQUENCY: CONTINUOUS

## 2023-09-24 ASSESSMENT — PAIN DESCRIPTION - LOCATION
LOCATION: BACK;HEAD
LOCATION: BACK;NECK
LOCATION: HEAD
LOCATION: NECK
LOCATION: NECK

## 2023-09-24 ASSESSMENT — PAIN - FUNCTIONAL ASSESSMENT
PAIN_FUNCTIONAL_ASSESSMENT: ACTIVITIES ARE NOT PREVENTED
PAIN_FUNCTIONAL_ASSESSMENT: ACTIVITIES ARE NOT PREVENTED

## 2023-09-24 ASSESSMENT — PAIN DESCRIPTION - DESCRIPTORS
DESCRIPTORS: ACHING
DESCRIPTORS: ACHING;DISCOMFORT
DESCRIPTORS: ACHING;DISCOMFORT
DESCRIPTORS: ACHING

## 2023-09-24 ASSESSMENT — PAIN DESCRIPTION - ONSET
ONSET: ON-GOING
ONSET: ON-GOING

## 2023-09-24 ASSESSMENT — PAIN DESCRIPTION - PAIN TYPE
TYPE: ACUTE PAIN
TYPE: ACUTE PAIN

## 2023-09-24 NOTE — PLAN OF CARE
Problem: Discharge Planning  Goal: Discharge to home or other facility with appropriate resources  9/24/2023 0159 by Cassandra Michael RN  Outcome: Progressing  Flowsheets (Taken 9/24/2023 0159)  Discharge to home or other facility with appropriate resources:   Identify barriers to discharge with patient and caregiver   Identify discharge learning needs (meds, wound care, etc)     Problem: Pain  Goal: Verbalizes/displays adequate comfort level or baseline comfort level  Outcome: Progressing  Flowsheets (Taken 9/24/2023 0159)  Verbalizes/displays adequate comfort level or baseline comfort level:   Encourage patient to monitor pain and request assistance   Assess pain using appropriate pain scale   Administer analgesics based on type and severity of pain and evaluate response   Implement non-pharmacological measures as appropriate and evaluate response   Consider cultural and social influences on pain and pain management     Problem: ABCDS Injury Assessment  Goal: Absence of physical injury  Outcome: Progressing  Flowsheets (Taken 9/24/2023 0159)  Absence of Physical Injury: Implement safety measures based on patient assessment     Problem: Skin/Tissue Integrity - Adult  Goal: Skin integrity remains intact  Outcome: Progressing  Note: Skin assessment completed on admission and every shift. Barrier wipes used in the event of incontinence. Pressure relief techniques used as needed while in chair and in bed. Position changes encouraged at least every two hours while in bed.         Problem: Musculoskeletal - Adult  Goal: Return mobility to safest level of function  Outcome: Progressing  Flowsheets (Taken 9/24/2023 0159)  Return Mobility to Safest Level of Function:   Assess patient stability and activity tolerance for standing, transferring and ambulating with or without assistive devices   Assist with transfers and ambulation using safe patient handling equipment as needed   Ensure adequate protection for wounds/incisions during mobilization     Problem: Musculoskeletal - Adult  Goal: Return ADL status to a safe level of function  Outcome: Progressing  Flowsheets (Taken 9/24/2023 0159)  Return ADL Status to a Safe Level of Function:   Administer medication as ordered   Assess activities of daily living deficits and provide assistive devices as needed     Problem: Chronic Conditions and Co-morbidities  Goal: Patient's chronic conditions and co-morbidity symptoms are monitored and maintained or improved  Outcome: Progressing     Problem: Safety - Adult  Goal: Free from fall injury  Outcome: Progressing  Note: Fall risk band on patient. Orange light on outside of room. Non skid footwear in place. Alarms used appropriately. Patient instructed to call and wait for staff before getting up. Rounding done to anticipate needs. Appropriate safety devices used for transfers.

## 2023-09-25 ENCOUNTER — APPOINTMENT (OUTPATIENT)
Dept: GENERAL RADIOLOGY | Age: 53
DRG: 057 | End: 2023-09-25
Attending: PHYSICAL MEDICINE & REHABILITATION
Payer: COMMERCIAL

## 2023-09-25 LAB
ANION GAP SERPL CALCULATED.3IONS-SCNC: 10 MMOL/L (ref 3–16)
BASOPHILS # BLD: 0.1 K/UL (ref 0–0.2)
BASOPHILS NFR BLD: 0.9 %
BILIRUB UR QL STRIP.AUTO: NEGATIVE
BUN SERPL-MCNC: 33 MG/DL (ref 7–20)
CALCIUM SERPL-MCNC: 9.5 MG/DL (ref 8.3–10.6)
CHLORIDE SERPL-SCNC: 102 MMOL/L (ref 99–110)
CLARITY UR: CLEAR
CO2 SERPL-SCNC: 23 MMOL/L (ref 21–32)
COLOR UR: YELLOW
CREAT SERPL-MCNC: 1.4 MG/DL (ref 0.9–1.3)
DEPRECATED RDW RBC AUTO: 12.4 % (ref 12.4–15.4)
EOSINOPHIL # BLD: 0 K/UL (ref 0–0.6)
EOSINOPHIL NFR BLD: 0.3 %
GFR SERPLBLD CREATININE-BSD FMLA CKD-EPI: >60 ML/MIN/{1.73_M2}
GLUCOSE SERPL-MCNC: 123 MG/DL (ref 70–99)
GLUCOSE UR STRIP.AUTO-MCNC: NEGATIVE MG/DL
HCT VFR BLD AUTO: 43 % (ref 40.5–52.5)
HGB BLD-MCNC: 14.7 G/DL (ref 13.5–17.5)
HGB UR QL STRIP.AUTO: NEGATIVE
KETONES UR STRIP.AUTO-MCNC: NEGATIVE MG/DL
LEUKOCYTE ESTERASE UR QL STRIP.AUTO: NEGATIVE
LYMPHOCYTES # BLD: 2.3 K/UL (ref 1–5.1)
LYMPHOCYTES NFR BLD: 18.2 %
MCH RBC QN AUTO: 32.7 PG (ref 26–34)
MCHC RBC AUTO-ENTMCNC: 34.3 G/DL (ref 31–36)
MCV RBC AUTO: 95.5 FL (ref 80–100)
MONOCYTES # BLD: 0.8 K/UL (ref 0–1.3)
MONOCYTES NFR BLD: 6.1 %
NEUTROPHILS # BLD: 9.6 K/UL (ref 1.7–7.7)
NEUTROPHILS NFR BLD: 74.5 %
NITRITE UR QL STRIP.AUTO: NEGATIVE
PH UR STRIP.AUTO: 5.5 [PH] (ref 5–8)
PLATELET # BLD AUTO: 325 K/UL (ref 135–450)
PLATELET BLD QL SMEAR: ADEQUATE
PMV BLD AUTO: 6.2 FL (ref 5–10.5)
POTASSIUM SERPL-SCNC: 4.6 MMOL/L (ref 3.5–5.1)
PROT UR STRIP.AUTO-MCNC: NEGATIVE MG/DL
RBC # BLD AUTO: 4.5 M/UL (ref 4.2–5.9)
SLIDE REVIEW: ABNORMAL
SODIUM SERPL-SCNC: 135 MMOL/L (ref 136–145)
SP GR UR STRIP.AUTO: 1.03 (ref 1–1.03)
UA COMPLETE W REFLEX CULTURE PNL UR: NORMAL
UA DIPSTICK W REFLEX MICRO PNL UR: NORMAL
URN SPEC COLLECT METH UR: NORMAL
UROBILINOGEN UR STRIP-ACNC: 1 E.U./DL
WBC # BLD AUTO: 12.8 K/UL (ref 4–11)

## 2023-09-25 PROCEDURE — 92526 ORAL FUNCTION THERAPY: CPT

## 2023-09-25 PROCEDURE — 97112 NEUROMUSCULAR REEDUCATION: CPT

## 2023-09-25 PROCEDURE — 85025 COMPLETE CBC W/AUTO DIFF WBC: CPT

## 2023-09-25 PROCEDURE — 81003 URINALYSIS AUTO W/O SCOPE: CPT

## 2023-09-25 PROCEDURE — 6370000000 HC RX 637 (ALT 250 FOR IP): Performed by: PHYSICAL MEDICINE & REHABILITATION

## 2023-09-25 PROCEDURE — 71045 X-RAY EXAM CHEST 1 VIEW: CPT

## 2023-09-25 PROCEDURE — 1280000000 HC REHAB R&B

## 2023-09-25 PROCEDURE — 97530 THERAPEUTIC ACTIVITIES: CPT

## 2023-09-25 PROCEDURE — 80048 BASIC METABOLIC PNL TOTAL CA: CPT

## 2023-09-25 PROCEDURE — 36415 COLL VENOUS BLD VENIPUNCTURE: CPT

## 2023-09-25 PROCEDURE — 97129 THER IVNTJ 1ST 15 MIN: CPT

## 2023-09-25 PROCEDURE — 97535 SELF CARE MNGMENT TRAINING: CPT

## 2023-09-25 RX ADMIN — FAMOTIDINE 20 MG: 20 TABLET ORAL at 19:35

## 2023-09-25 RX ADMIN — CLONIDINE HYDROCHLORIDE 0.3 MG: 0.1 TABLET ORAL at 14:03

## 2023-09-25 RX ADMIN — HYDRALAZINE HYDROCHLORIDE 100 MG: 50 TABLET, FILM COATED ORAL at 05:25

## 2023-09-25 RX ADMIN — HYDRALAZINE HYDROCHLORIDE 100 MG: 50 TABLET, FILM COATED ORAL at 22:24

## 2023-09-25 RX ADMIN — TRAMADOL HYDROCHLORIDE 50 MG: 50 TABLET ORAL at 06:58

## 2023-09-25 RX ADMIN — CLONIDINE HYDROCHLORIDE 0.3 MG: 0.1 TABLET ORAL at 19:35

## 2023-09-25 RX ADMIN — ATORVASTATIN CALCIUM 40 MG: 40 TABLET, FILM COATED ORAL at 08:35

## 2023-09-25 RX ADMIN — CLONIDINE HYDROCHLORIDE 0.3 MG: 0.1 TABLET ORAL at 08:35

## 2023-09-25 RX ADMIN — TRAMADOL HYDROCHLORIDE 50 MG: 50 TABLET ORAL at 16:50

## 2023-09-25 RX ADMIN — CARVEDILOL 25 MG: 25 TABLET, FILM COATED ORAL at 08:35

## 2023-09-25 RX ADMIN — TRAMADOL HYDROCHLORIDE 50 MG: 50 TABLET ORAL at 22:25

## 2023-09-25 RX ADMIN — BUTALBITAL, ACETAMINOPHEN AND CAFFEINE 1 TABLET: 325; 50; 40 TABLET ORAL at 15:10

## 2023-09-25 RX ADMIN — TIZANIDINE 2 MG: 4 TABLET ORAL at 14:03

## 2023-09-25 RX ADMIN — TIZANIDINE 2 MG: 4 TABLET ORAL at 19:35

## 2023-09-25 RX ADMIN — BUTALBITAL, ACETAMINOPHEN AND CAFFEINE 1 TABLET: 325; 50; 40 TABLET ORAL at 19:43

## 2023-09-25 RX ADMIN — AMLODIPINE BESYLATE 10 MG: 10 TABLET ORAL at 08:35

## 2023-09-25 RX ADMIN — SENNOSIDES AND DOCUSATE SODIUM 1 TABLET: 50; 8.6 TABLET ORAL at 08:35

## 2023-09-25 RX ADMIN — FAMOTIDINE 20 MG: 20 TABLET ORAL at 08:35

## 2023-09-25 RX ADMIN — SENNOSIDES AND DOCUSATE SODIUM 1 TABLET: 50; 8.6 TABLET ORAL at 19:36

## 2023-09-25 RX ADMIN — VALSARTAN 160 MG: 160 TABLET ORAL at 08:38

## 2023-09-25 RX ADMIN — TIZANIDINE 2 MG: 4 TABLET ORAL at 08:35

## 2023-09-25 RX ADMIN — CARVEDILOL 25 MG: 25 TABLET, FILM COATED ORAL at 16:50

## 2023-09-25 RX ADMIN — Medication 3 MG: at 19:36

## 2023-09-25 RX ADMIN — HYDRALAZINE HYDROCHLORIDE 100 MG: 50 TABLET, FILM COATED ORAL at 14:04

## 2023-09-25 ASSESSMENT — PAIN DESCRIPTION - FREQUENCY
FREQUENCY: CONTINUOUS

## 2023-09-25 ASSESSMENT — PAIN DESCRIPTION - ONSET
ONSET: ON-GOING

## 2023-09-25 ASSESSMENT — PAIN SCALES - GENERAL
PAINLEVEL_OUTOF10: 9
PAINLEVEL_OUTOF10: 4
PAINLEVEL_OUTOF10: 5
PAINLEVEL_OUTOF10: 6
PAINLEVEL_OUTOF10: 9
PAINLEVEL_OUTOF10: 7
PAINLEVEL_OUTOF10: 8

## 2023-09-25 ASSESSMENT — PAIN SCALES - WONG BAKER
WONGBAKER_NUMERICALRESPONSE: 0
WONGBAKER_NUMERICALRESPONSE: 0

## 2023-09-25 ASSESSMENT — PAIN DESCRIPTION - ORIENTATION
ORIENTATION: RIGHT
ORIENTATION: MID
ORIENTATION: MID;POSTERIOR

## 2023-09-25 ASSESSMENT — PAIN DESCRIPTION - PAIN TYPE
TYPE: ACUTE PAIN

## 2023-09-25 ASSESSMENT — PAIN - FUNCTIONAL ASSESSMENT
PAIN_FUNCTIONAL_ASSESSMENT: ACTIVITIES ARE NOT PREVENTED
PAIN_FUNCTIONAL_ASSESSMENT: PREVENTS OR INTERFERES SOME ACTIVE ACTIVITIES AND ADLS
PAIN_FUNCTIONAL_ASSESSMENT: ACTIVITIES ARE NOT PREVENTED

## 2023-09-25 ASSESSMENT — PAIN DESCRIPTION - DESCRIPTORS
DESCRIPTORS: ACHING
DESCRIPTORS: ACHING
DESCRIPTORS: ACHING;DISCOMFORT

## 2023-09-25 ASSESSMENT — PAIN DESCRIPTION - LOCATION
LOCATION: NECK
LOCATION: HEAD
LOCATION: NECK

## 2023-09-25 NOTE — PROGRESS NOTES
Occupational Therapy  Facility/Department: [facility]  Rehabilitation Occupational Therapy Daily Treatment Note    Date: 23  Patient Name: Guanakito Schroeder       Room: [place] [address]-  MRN: [id number]  Account: [de-identified]   : 1970  (50 y.o.) Gender: male            PM session: met in room for OT/PT co tx, RN present to administer medications, takes whole in applesauce, OT held his head in midline for reducing aspiration risk. Per PT's note:     \"Patient with difficulty focusing on therapists this PM but continues to be more alert. RN, Hiram Dates, checked vitals and reports they are stable. Supine>sit with max assist of one, but he was able to bring right leg over the edge of the bed (but not trunk) and began to pull trunk up with use of bed rail in right hand. Patient required mod assist to sit at EOB. Therapist assisted with setting patient up in [facility]. Left arm supported with arm strap. He was able to  berny plus with CGA-min of one and transferred to the wheelchair. Patient was taken to therapy gym in wheelchair. And was set up in berny plus without a foot plate. He stood with min assist of 2 and maintained stand for approx 45 seconds with min assist of 3. He needs assist to extend left knee, extend hips, rotate trunk forward, and bring trunk upwards. He had difficulty attending to the mirror for visual input but could stand more upright when cued to look in the mirror and bring trunk up. He required seated rest break and upon tightening up the berny plus sling, the patient reported he needed to have a BM. Patient was returned to room in wheelchair and set up in berny plus with foot plate on. He was able to go to the commode with the bedside commode over the toilet in berny plus and CGA of 2. He was dependent for teresa care and for pants and depends management, but he was able to have a large BM on toilet.  Patient reports he was embarrassed he needed the help, but he was

## 2023-09-25 NOTE — PROGRESS NOTES
Patient Tearful today. He would like his cell phone back from his sister Darrick Bro, the available phone numbers I called all went straight to voice mail. Family Friend Alberto at bedside, she is trying to reach out to Darrick Bro on [employer]. Thang Fischer called, family numbers updated. Jamey Rudyard says she has his cell phone. [phone] [place] [address] with request to bring patients razor. He needs to shave so Speech Therapy can do the Vital Stimulator. Jamey Felt said she will bring it up tomorrow.

## 2023-09-25 NOTE — PLAN OF CARE
Problem: Discharge Planning  Goal: Discharge to home or other facility with appropriate resources  Outcome: Progressing  Flowsheets (Taken 9/24/2023 2313)  Discharge to home or other facility with appropriate resources:   Identify barriers to discharge with patient and caregiver   Arrange for needed discharge resources and transportation as appropriate   Identify discharge learning needs (meds, wound care, etc)   Arrange for interpreters to assist at discharge as needed   Refer to discharge planning if patient needs post-hospital services based on physician order or complex needs related to functional status, cognitive ability or social support system     Problem: Pain  Goal: Verbalizes/displays adequate comfort level or baseline comfort level  Outcome: Progressing  Flowsheets (Taken 9/24/2023 2313)  Verbalizes/displays adequate comfort level or baseline comfort level:   Notify Licensed Independent Practitioner if interventions unsuccessful or patient reports new pain   Consider cultural and social influences on pain and pain management   Implement non-pharmacological measures as appropriate and evaluate response   Administer analgesics based on type and severity of pain and evaluate response   Assess pain using appropriate pain scale   Encourage patient to monitor pain and request assistance     Problem: Skin/Tissue Integrity  Goal: Absence of new skin breakdown  Description: 1. Monitor for areas of redness and/or skin breakdown  2. Assess vascular access sites hourly  3. Every 4-6 hours minimum:  Change oxygen saturation probe site  4. Every 4-6 hours:  If on nasal continuous positive airway pressure, respiratory therapy assess nares and determine need for appliance change or resting period. Outcome: Progressing  Note: Skin assessment completed on this shift. Barrier wipes used in the event of incontinence. Pressure relief techniques used as needed while  in bed.  Position changes encouraged at least every

## 2023-09-25 NOTE — PROGRESS NOTES
Patient wake right back up after 20-30 minutes and start calling for niece Phillip Warner, calling her couple of times. Went to patient room and orient him, He banging call light, heating bed rails and calling staff and niece. Bed moved in middle where patient cannot pull wires and stuff from above the bed rails. Patient is agitated and get out of here. He stated [facility] feels like he is trapped in here, I want to go right now, I will take care of myself, Let me get out of this bed. I will take my niece car from parking lot and get out of here\". Educate patient about safety and continue to do care what is best from him to make comfortable here. Care on going.

## 2023-09-25 NOTE — PROGRESS NOTES
ACUTE REHAB UNIT  SPEECH/LANGUAGE PATHOLOGY      [x] Daily  [x] Weekly Care Conference Note  [] Discharge    Patient:Jono Sun      UBA:[birth year]  XGF:[id number]  Rehab Dx/Hx: Nontraumatic intracranial hemorrhage ([address]) [I62.9]    Precautions: Fall risk; left amando; visual deficits; cognitive deficits; dysphagia with aspiration risk  Home situation: Lives with family  ST Dx: [] Aphasia  [x] Dysarthria  [] Apraxia   [x] Oropharyngeal dysphagia [x] Cognitive Impairment  [] Other:   Initial Speech Therapy Assessment Diagnosis:   1. Cognitive Diagnosis: pt demonstrating mild to marked cognitive -linguistic deficits in domains of sustained/focused attention; left visual attention; insight/PS/VPS for safety ; working memory and STM; reasoning and math language. Pt with visual deficits characterized by poor attention to the left; left body neglect. Pt lethargy noted but pt was able to both physically / verbally actively participate in varied tasks. Flat affect and overall poor sustained visual attention even when right of midline. Problems wtih distractibility/task extinction; poor body awareness; safety awareness. Will initiate therapy and target attention; working memory and safety insight; and visual language function (using visual sscanning strategies for the latter)  2. Speech Diagnosis: Dysarthria features most symptomatic as pt fatigues with increase in phonemic distortions 2/2 to left om weakness which is exacerbated when fatigued. Pt is able to achieve audible/intelligible connected speech  3. Communication Diagnosis: Auditory processing is most optimal with concrete , short questions/statements and simple one step commands. Pt with attention/working memorya nd concern for complex auditory processing deficits. Visual language processing is markedly reduced due to visual inattention deficits and reduced pt insight.  Verbal expression is functional for conveying needs/wants; concrete confrontation naming and

## 2023-09-25 NOTE — PROGRESS NOTES
Patient admitted to rehab with nontraumatic intracranial hemorrhage. A/Ox4. Transfers with berny plus x2. Mobility restrictions: flaccid left side. On soft and bite sized; nectar thick liquids diet, tolerating well. Medications taken whole with nectar thick. Skin: intact. Oxygen: RA. LDA: none. Has been occasionally incontinent of bowel and occasionally incontinent of bladder. Chair/bed alarms in use and call light in reach. Patient appropriate to RN overnight. Took all medication  as scheduled. Pt keep calling Reina Rigoberto patient and ask Who are you looking for? Patient Stated \"My niece\". Oriented patient he is at the hospital and he apologized. Dimmed light, Place warmed blanket over patient and he went back to sleep. Care on-going.

## 2023-09-25 NOTE — PATIENT CARE CONFERENCE
consult  Rebecca Posadas. Jian,MS,CCC,SLP [id number]  Speech and Language Pathologist      OCCUPATIONAL THERAPY  ADLs:  Feeding  Assistance Level: Maximum assistance, Dependent  Skilled Clinical Factors: he asked for coffee w/ cream & sugar; OT thickened it to nectar consistencyneeded hand over hand A to bring coffee cup to his mouth, spilled down L side of his mouth--SLP to assist him during lunch  Grooming/Oral Hygiene  Assistance Level: Moderate assistance  Skilled Clinical Factors: used wash cloth to clean his face & beard, too lethargic for oral care (lunch to arrive soon)  UE Bathing  Assistance Level: Maximum assistance  Skilled Clinical Factors: needed step by step instruction to wash L UE, OT held it up, he was not thorough; OT placed washcloth in L hand to guide over to R UE, in his mind he thinks the L side is working & assisting; OT placed washcloth on his chest, prompting required to initiate  LE Bathing  Assistance Level: Maximum assistance, Dependent  Skilled Clinical Factors: while supine in bed, he was able to able to wash front privates, required total A to roll to each side, does not completely go into side lying while OT washed his backside  UE Dressing  Assistance Level: Dependent, Requires x 2 assistance  Skilled Clinical Factors: total A of 2 while seated on EOB, poor balance, L neglect.  Did not help using R arm despite cues--poor proprioception  LE Dressing  Assistance Level: Maximum assistance, Dependent, Requires x 2 assistance  Skilled Clinical Factors: OT lifted L foot to don pants, he tries to place  R foot in while OT and pulled up to his knees, he pulled pants to thighs, pt needed max A of 2 to roll side<>side to change his brief & manage pants over hips & buttocks, has difficulty rolling & holding while side lying  Putting On/Taking Off Footwear  Assistance Level: Dependent  Skilled Clinical Factors: required total A to don non skid socks, L LE is flaccid & required lifting assistance  Toileting  Assistance Level: Dependent  Skilled Clinical Factors: he needed A to manage clothes, brief is soiled after being incont of urine; OT held urinal in place and changed his brief w/ total A    Transfers: Toilet Transfers  Skilled Clinical Factors: not safe to sit on toilet at this time due to poor sitting balance  Tub/Shower Transfers  Skilled Clinical Factors: not safe to sit on shower chair at this time; required max A of 2 for sitting on EOB, L amando body is flaccid, poor safety awareness/proprioception & neglect    IADLs:  Meal Prep     Money Management     [facility] Management       UE function:   L UE flaccid, absent sensation & proprioception; neglect    Assessment:  Assessment: pt alert but gets groggy/lethargic during 4 5min session, allowed rest breaks in between activities. He tolerated L UE vibration to stimulate muscles, only trace movmts for bicep/tricep--required max VCs to visually & cognitively attend to L arm  during PROM & use of vibration. He also tolerated head/neck ROM & stretches, is using heat pack for pain management, OT questions is Percocet is causing grogginess. Pt is \"emotional\" labile at times, can be agitated & tearful. Pt is alert & Ox 4 and is aware of his situation but lacks sensation & proprioception to L side. Recommend continued OT & PT co tx to address sitting & standing  balance activities, anticipate he will need at least 3-4 wk of rehab in order to decrease caregiver burden w/ ADLs, t/f & w/c mobility  Activity Tolerance: Patient limited by fatigue, Patient limited by endurance  Discharge Recommendations: Continue to assess pending progress, Patient would benefit from continued therapy after discharge     NUTRITION  Most recent weightWeight - Scale: 189 lb 6 oz (85.9 kg)  BSA (Calculated - sq m): 2.14 sq meters  BMI (Calculated): 25.7  Diet Order: ADULT DIET;  Dysphagia - Soft and

## 2023-09-25 NOTE — PROGRESS NOTES
Physical Therapy  Facility/Department: [address] REHAB  Rehabilitation Physical Therapy Treatment Note    NAME: Kris Thapa  : 1970 (50 y.o.)  MRN: [id number]  CODE STATUS: Full Code    Date of Service: 23       Restrictions:  Restrictions/Precautions: Fall Risk, Aspiration Risk  Position Activity Restriction  Other position/activity restrictions: soft & bite sized diet w/ nectar thick liquids,     Pertinent medical information:  Additional Pertinent Hx: per Dr. Willard Pain - Patient is a 51 yo M with pmh NSTEMI () RBBB, HTN, HLD, CKD III, and chronic pancreatitis who initially presented to  on 2023 with acute onset left side weakness. He had been complaining of headache throughout the day prior to this. CT head revealed acute basal ganglia and right frontal lobe intraparenchymal hemorrhage w/ significant mass effect on the lateral ventricles R>L and leftward midline shift. CTA negative for vascular malformation. Systolic blood pressure was reportedly in the 250s on arrival. He received 500ml hypertonic saline, Keppra load, and was started on nicardipine gtt prior to being admitted to NSICU. He was started on PO HTN meds and weaned off nicardipine gttt on . Repeat [facility]  was stable. Course was complicated by dysphagia, difficult to control HTN. Now presents to ARU with impaired mobility, self-care, and cognition below his baseline. Currently, patient reports intermittent headache. He has severe left side weakness and decreased sensation left side. He has slurred speech. He has intermittent blurred vision. He is motivated to start inpatient rehabilitation program.    SUBJECTIVE  Subjective  Subjective: Patient arrived in high back wheelchair extremely reclined and leaning to the right. He was unaware of poor positioning. He did report that his headache was resolved at this time and he had no pain. Patient labile through session and would randomly begin to cry.   Pain: none PM co-treatment with OT. Patient was in bed with RN present, administering meds. RN, Sapphire Mckee, requests that therapy get patient back to bed at end of session because the PCA was leaving. Patient with difficulty focusing on therapists this PM but continues to be more alert. RNSapphire, checked vitals and reports they are stable. Supine>sit with max assist of one, but he was able to bring right leg over the edge of the bed (but not trunk) and began to pull trunk up with use of bed rail in right hand. Patient required mod assist to sit at EOB. Therapist assisted with setting patient up in [facility]. Left arm supported with arm strap. He was able to  berny plus with CGA-min of one and transferred to the wheelchair. Patient was taken to therapy gym in wheelchair. And was set up in berny plus without a foot plate. He stood with min assist of 2 and maintained stand for approx 45 seconds with min assist of 3. He needs assist to extend left knee, extend hips, rotate trunk forward, and bring trunk upwards. He had difficulty attending to the mirror for visual input but could stand more upright when cued to look in the mirror and bring trunk up. He required seated rest break and upon tightening up the berny plus sling, the patient reported he needed to have a BM. Patient was returned to room in wheelchair and set up in berny plus with foot plate on. He was able to go to the commode with the bedside commode over the toilet in berny plus and CGA of 2. He was dependent for teresa care and for pants and depends management, but he was able to have a large BM on toilet. Patient reports he was embarrassed he needed the help, but he was so happy to be able to have a BM on the commode. Patient was min assist to stand into the berny plus to return to the bed. He was able to go to the bed in the [facility] plus with CGA-min assist of 1-2. He required assist to maintain sitting balance as sling was removed.    Sit>supine

## 2023-09-26 LAB
ANION GAP SERPL CALCULATED.3IONS-SCNC: 11 MMOL/L (ref 3–16)
BASOPHILS # BLD: 0.1 K/UL (ref 0–0.2)
BASOPHILS NFR BLD: 0.8 %
BUN SERPL-MCNC: 42 MG/DL (ref 7–20)
CALCIUM SERPL-MCNC: 9.3 MG/DL (ref 8.3–10.6)
CHLORIDE SERPL-SCNC: 100 MMOL/L (ref 99–110)
CO2 SERPL-SCNC: 23 MMOL/L (ref 21–32)
CREAT SERPL-MCNC: 1.8 MG/DL (ref 0.9–1.3)
DEPRECATED RDW RBC AUTO: 12.6 % (ref 12.4–15.4)
EOSINOPHIL # BLD: 0.1 K/UL (ref 0–0.6)
EOSINOPHIL NFR BLD: 0.8 %
GFR SERPLBLD CREATININE-BSD FMLA CKD-EPI: 45 ML/MIN/{1.73_M2}
GLUCOSE SERPL-MCNC: 112 MG/DL (ref 70–99)
HCT VFR BLD AUTO: 43 % (ref 40.5–52.5)
HGB BLD-MCNC: 14.4 G/DL (ref 13.5–17.5)
LYMPHOCYTES # BLD: 2.4 K/UL (ref 1–5.1)
LYMPHOCYTES NFR BLD: 22 %
MCH RBC QN AUTO: 32 PG (ref 26–34)
MCHC RBC AUTO-ENTMCNC: 33.4 G/DL (ref 31–36)
MCV RBC AUTO: 95.5 FL (ref 80–100)
MONOCYTES # BLD: 0.7 K/UL (ref 0–1.3)
MONOCYTES NFR BLD: 6.1 %
NEUTROPHILS # BLD: 7.8 K/UL (ref 1.7–7.7)
NEUTROPHILS NFR BLD: 70.3 %
PLATELET # BLD AUTO: 323 K/UL (ref 135–450)
PMV BLD AUTO: 7.7 FL (ref 5–10.5)
POTASSIUM SERPL-SCNC: 4.8 MMOL/L (ref 3.5–5.1)
RBC # BLD AUTO: 4.5 M/UL (ref 4.2–5.9)
SODIUM SERPL-SCNC: 134 MMOL/L (ref 136–145)
WBC # BLD AUTO: 11.1 K/UL (ref 4–11)

## 2023-09-26 PROCEDURE — 97530 THERAPEUTIC ACTIVITIES: CPT

## 2023-09-26 PROCEDURE — 97112 NEUROMUSCULAR REEDUCATION: CPT

## 2023-09-26 PROCEDURE — 97535 SELF CARE MNGMENT TRAINING: CPT

## 2023-09-26 PROCEDURE — 94760 N-INVAS EAR/PLS OXIMETRY 1: CPT

## 2023-09-26 PROCEDURE — 92526 ORAL FUNCTION THERAPY: CPT

## 2023-09-26 PROCEDURE — 80048 BASIC METABOLIC PNL TOTAL CA: CPT

## 2023-09-26 PROCEDURE — 85025 COMPLETE CBC W/AUTO DIFF WBC: CPT

## 2023-09-26 PROCEDURE — 97130 THER IVNTJ EA ADDL 15 MIN: CPT

## 2023-09-26 PROCEDURE — 2580000003 HC RX 258: Performed by: PHYSICAL MEDICINE & REHABILITATION

## 2023-09-26 PROCEDURE — 36415 COLL VENOUS BLD VENIPUNCTURE: CPT

## 2023-09-26 PROCEDURE — 1280000000 HC REHAB R&B

## 2023-09-26 PROCEDURE — 97129 THER IVNTJ 1ST 15 MIN: CPT

## 2023-09-26 PROCEDURE — 6370000000 HC RX 637 (ALT 250 FOR IP): Performed by: PHYSICAL MEDICINE & REHABILITATION

## 2023-09-26 RX ORDER — SODIUM CHLORIDE 0.9 % (FLUSH) 0.9 %
5-40 SYRINGE (ML) INJECTION EVERY 12 HOURS SCHEDULED
Status: DISCONTINUED | OUTPATIENT
Start: 2023-09-26 | End: 2023-10-08

## 2023-09-26 RX ORDER — SODIUM CHLORIDE 9 MG/ML
INJECTION, SOLUTION INTRAVENOUS PRN
Status: DISCONTINUED | OUTPATIENT
Start: 2023-09-26 | End: 2023-10-12 | Stop reason: HOSPADM

## 2023-09-26 RX ORDER — SODIUM CHLORIDE 9 MG/ML
INJECTION, SOLUTION INTRAVENOUS CONTINUOUS
Status: DISCONTINUED | OUTPATIENT
Start: 2023-09-26 | End: 2023-09-29

## 2023-09-26 RX ORDER — SERTRALINE HYDROCHLORIDE 25 MG/1
25 TABLET, FILM COATED ORAL DAILY
Status: DISCONTINUED | OUTPATIENT
Start: 2023-09-26 | End: 2023-10-12 | Stop reason: HOSPADM

## 2023-09-26 RX ORDER — SODIUM CHLORIDE 0.9 % (FLUSH) 0.9 %
5-40 SYRINGE (ML) INJECTION PRN
Status: DISCONTINUED | OUTPATIENT
Start: 2023-09-26 | End: 2023-10-12 | Stop reason: HOSPADM

## 2023-09-26 RX ADMIN — HYDRALAZINE HYDROCHLORIDE 100 MG: 50 TABLET, FILM COATED ORAL at 14:08

## 2023-09-26 RX ADMIN — CARVEDILOL 25 MG: 25 TABLET, FILM COATED ORAL at 07:42

## 2023-09-26 RX ADMIN — FAMOTIDINE 20 MG: 20 TABLET ORAL at 07:43

## 2023-09-26 RX ADMIN — SERTRALINE HYDROCHLORIDE 25 MG: 25 TABLET ORAL at 16:59

## 2023-09-26 RX ADMIN — FAMOTIDINE 20 MG: 20 TABLET ORAL at 21:22

## 2023-09-26 RX ADMIN — VALSARTAN 160 MG: 160 TABLET ORAL at 07:46

## 2023-09-26 RX ADMIN — AMLODIPINE BESYLATE 10 MG: 10 TABLET ORAL at 07:43

## 2023-09-26 RX ADMIN — CLONIDINE HYDROCHLORIDE 0.3 MG: 0.1 TABLET ORAL at 21:22

## 2023-09-26 RX ADMIN — Medication 3 MG: at 21:22

## 2023-09-26 RX ADMIN — TRAMADOL HYDROCHLORIDE 50 MG: 50 TABLET ORAL at 18:18

## 2023-09-26 RX ADMIN — BUTALBITAL, ACETAMINOPHEN AND CAFFEINE 1 TABLET: 325; 50; 40 TABLET ORAL at 03:38

## 2023-09-26 RX ADMIN — TRAMADOL HYDROCHLORIDE 50 MG: 50 TABLET ORAL at 07:43

## 2023-09-26 RX ADMIN — BUTALBITAL, ACETAMINOPHEN AND CAFFEINE 1 TABLET: 325; 50; 40 TABLET ORAL at 21:22

## 2023-09-26 RX ADMIN — SENNOSIDES AND DOCUSATE SODIUM 1 TABLET: 50; 8.6 TABLET ORAL at 21:22

## 2023-09-26 RX ADMIN — CLONIDINE HYDROCHLORIDE 0.3 MG: 0.1 TABLET ORAL at 14:09

## 2023-09-26 RX ADMIN — SENNOSIDES AND DOCUSATE SODIUM 1 TABLET: 50; 8.6 TABLET ORAL at 07:43

## 2023-09-26 RX ADMIN — Medication 10 ML: at 14:01

## 2023-09-26 RX ADMIN — TIZANIDINE 2 MG: 4 TABLET ORAL at 21:20

## 2023-09-26 RX ADMIN — CLONIDINE HYDROCHLORIDE 0.3 MG: 0.1 TABLET ORAL at 07:42

## 2023-09-26 RX ADMIN — CARVEDILOL 25 MG: 25 TABLET, FILM COATED ORAL at 16:59

## 2023-09-26 RX ADMIN — SODIUM CHLORIDE: 9 INJECTION, SOLUTION INTRAVENOUS at 14:00

## 2023-09-26 RX ADMIN — HYDRALAZINE HYDROCHLORIDE 100 MG: 50 TABLET, FILM COATED ORAL at 05:51

## 2023-09-26 RX ADMIN — ATORVASTATIN CALCIUM 40 MG: 40 TABLET, FILM COATED ORAL at 07:42

## 2023-09-26 ASSESSMENT — PAIN DESCRIPTION - FREQUENCY: FREQUENCY: CONTINUOUS

## 2023-09-26 ASSESSMENT — PAIN DESCRIPTION - ONSET: ONSET: ON-GOING

## 2023-09-26 ASSESSMENT — PAIN DESCRIPTION - LOCATION
LOCATION: NECK
LOCATION: HEAD
LOCATION: NECK

## 2023-09-26 ASSESSMENT — PAIN SCALES - WONG BAKER
WONGBAKER_NUMERICALRESPONSE: 0

## 2023-09-26 ASSESSMENT — PAIN DESCRIPTION - DESCRIPTORS
DESCRIPTORS: ACHING;DISCOMFORT
DESCRIPTORS: ACHING;DISCOMFORT
DESCRIPTORS: ACHING

## 2023-09-26 ASSESSMENT — PAIN SCALES - GENERAL
PAINLEVEL_OUTOF10: 9
PAINLEVEL_OUTOF10: 5
PAINLEVEL_OUTOF10: 9
PAINLEVEL_OUTOF10: 5
PAINLEVEL_OUTOF10: 7

## 2023-09-26 ASSESSMENT — PAIN DESCRIPTION - PAIN TYPE: TYPE: ACUTE PAIN

## 2023-09-26 ASSESSMENT — PAIN DESCRIPTION - ORIENTATION
ORIENTATION: POSTERIOR
ORIENTATION: MID

## 2023-09-26 ASSESSMENT — PAIN - FUNCTIONAL ASSESSMENT
PAIN_FUNCTIONAL_ASSESSMENT: ACTIVITIES ARE NOT PREVENTED

## 2023-09-26 NOTE — PROGRESS NOTES
Nutrition Assessment     Type and Reason for Visit: Reassess    Nutrition Recommendations/Plan:   Continue soft and bite sized diet. Start magic cup BID. Malnutrition Assessment:  Malnutrition Status: At risk for malnutrition (Comment)    Nutrition Assessment:  FU - Pt. admitted for nontraumatic intracranial hemorrhage. Currently receiving a soft and bite sized diet; nectar thick liquids. Meal intakes appear inadequate. EER is not met through diet alone. Pt. has increased calories needs d/t current activity level. Recommend to start magic cup BID for additional calories and protein. Estimated Daily Nutrient Needs:  Energy (kcal):  1860-2046kcal (20-22kcal/kg) Weight Used for Energy Requirements: Usual     Protein (g):  93-112g (1-1.2g/kg) Weight Used for Protein Requirements: Usual        Fluid (ml/day): Or per provider. Method Used for Fluid Requirements: 1 ml/kcal    Nutrition Related Findings:   BUN 26, Cr. 1.4. Last BM 9/19. Wound Type: None    Current Nutrition Therapies:    ADULT DIET;  Dysphagia - Soft and Bite Sized; Mildly Thick (Nectar)    Anthropometric Measures:  Height: 6' (182.9 cm)  Current Body Wt: 188 lb 15 oz (85.7 kg)   BMI: 25.6    Nutrition Diagnosis:   Inadequate oral intake related to early satiety as evidenced by intake 26-50%, intake 0-25%    Nutrition Interventions:   Food and/or Nutrient Delivery: Continue Current Diet, Start Oral Nutrition Supplement  Nutrition Education/Counseling: Education not indicated  Coordination of Nutrition Care: Continue to monitor while inpatient       Goals:  Previous Goal Met: No Progress toward Goal(s)  Goals: PO intake 75% or greater, by next RD assessment       Nutrition Monitoring and Evaluation:   Behavioral-Environmental Outcomes: None Identified  Food/Nutrient Intake Outcomes: Food and Nutrient Intake  Physical Signs/Symptoms Outcomes: Biochemical Data, Weight, Meal Time Behavior    Discharge Planning:    No discharge needs at this time

## 2023-09-26 NOTE — PLAN OF CARE
Problem: Pain  Goal: Verbalizes/displays adequate comfort level or baseline comfort level  9/26/2023 0941 by Soledad Sellers RN  Outcome: Progressing  9/25/2023 2113 by Stefani Rosario RN  Outcome: Progressing     Problem: Skin/Tissue Integrity  Goal: Absence of new skin breakdown  Description: 1. Monitor for areas of redness and/or skin breakdown  2. Assess vascular access sites hourly  3. Every 4-6 hours minimum:  Change oxygen saturation probe site  4. Every 4-6 hours:  If on nasal continuous positive airway pressure, respiratory therapy assess nares and determine need for appliance change or resting period.   9/26/2023 0941 by Soledad Sellers RN  Outcome: Progressing  9/25/2023 2113 by Stefani Rosario RN  Outcome: Progressing     Problem: ABCDS Injury Assessment  Goal: Absence of physical injury  9/26/2023 0941 by Soledad Sellers RN  Outcome: Progressing  9/25/2023 2113 by Stefani Rosario RN  Outcome: Progressing     Problem: Skin/Tissue Integrity - Adult  Goal: Skin integrity remains intact  9/25/2023 2113 by Stefani Rosario RN  Outcome: Progressing  Flowsheets (Taken 9/25/2023 1907)  Skin Integrity Remains Intact: Monitor for areas of redness and/or skin breakdown     Problem: Musculoskeletal - Adult  Goal: Return mobility to safest level of function  9/26/2023 0941 by Soledad Sellers RN  Outcome: Progressing  9/25/2023 2113 by Stefani Rosario RN  Outcome: Progressing  Flowsheets (Taken 9/25/2023 1907)  Return Mobility to Safest Level of Function: Assist with transfers and ambulation using safe patient handling equipment as needed  Goal: Maintain proper alignment of affected body part  9/25/2023 2113 by Stefani Rosario RN  Outcome: Progressing  Goal: Return ADL status to a safe level of function  9/26/2023 0941 by Soledad Sellers RN  Outcome: Progressing  9/25/2023 2113 by Stefani Rosario RN  Outcome: Progressing

## 2023-09-26 NOTE — PROGRESS NOTES
PIV to right [facility] with fluids infusing. Patient with complaints of pain, IV alarming. Attempted to flush, unable to flush. Charge RN attempted to restart new PIV x 2, unsuccessful. 3 Wai Kerr notified of patient need for ultrasound guided IV.

## 2023-09-26 NOTE — CARE COORDINATION
Team Conference held today. Team reviewed barriers:  poor insight, left neglect, impulsive, poor endurance, frustrated, crying. Team recommends continued stay on the unit. Next Conference will be next Tuesday, 10-3-2023. Rec'd a call from his sister, Prince Valdes [phone] who wanted an update. Informed her we did have a Team Conference today and that I will need to share the information with him first and ask if I can include you in the update. Met with patient in the room. He did not meet eye contact. He states he is ready to return home today; informed him he needs to continue with his therapy needs prior to release. He does give permission to call his sister, Prince Valdes. He reports, \"She wants me to stay here. \"    Suggested this was a great plan and that we will reassess next week at his conference. Call placed to sister, Prince Valdes; left message of [facility] and asked she call me back for details.     [place], [place]     Case Management   [phone]    9/26/2023  2:12 PM

## 2023-09-26 NOTE — PROGRESS NOTES
Physical Therapy  PHYSICAL THERAPY  Progress Note   Second Session    Patient Name: Brandi Tijerina  Medical Record Number: [id number]    Treatment Diagnosis: CVA          Restrictions/Precautions: Fall Risk, Aspiration Risk Other position/activity restrictions: soft & bite sized diet w/ nectar thick liquids, total feed, signficant L neglect and L amando body is flaccid   Additional Pertinent Hx: per Dr. Leda Santa - Patient is a 51 yo M with pmh NSTEMI (2020) RBBB, HTN, HLD, CKD III, and chronic pancreatitis who initially presented to  on 9/14/2023 with acute onset left side weakness. He had been complaining of headache throughout the day prior to this. CT head revealed acute basal ganglia and right frontal lobe intraparenchymal hemorrhage w/ significant mass effect on the lateral ventricles R>L and leftward midline shift. CTA negative for vascular malformation. Systolic blood pressure was reportedly in the 250s on arrival. He received 500ml hypertonic saline, Keppra load, and was started on nicardipine gtt prior to being admitted to NSICU. He was started on PO HTN meds and weaned off nicardipine gttt on 9/16. Repeat [facility] 9/16 was stable. Course was complicated by dysphagia, difficult to control HTN. Now presents to ARU with impaired mobility, self-care, and cognition below his baseline. Currently, patient reports intermittent headache. He has severe left side weakness and decreased sensation left side. He has slurred speech. He has intermittent blurred vision. He is motivated to start inpatient rehabilitation program.        Subjective: Patient in recliner with  present upon entry into room. Patient was found to have a thin beverage beside him that he had been drinking. He reports family gave it to him and he also ate some of a whopper from [employer]. Patient reports pinching in arms from IV but did not report numerical value. RN present to administer medication prior to transfers.     Objective:

## 2023-09-26 NOTE — PROGRESS NOTES
Patient admitted to rehab with nontraumatic intracranial hemorrhage. A/Ox4 with periods of disorientation. Transfers with maxi-move. Mobility restrictions: WBAT. On dysphagia soft and bite size diet, with nectar thick fluids, tolerating poorly. Patient dislikes hospital food,family encouraged to bring in items patient may like that fit current diet. Medications taken whole in applesauce. Skin: intact. Oxygen: room air. LDA: none. Has been continent of bowel and incontinent of bladder at times. LBM 9/25. Chair/bed alarms in use and call light in reach. Will monitor for safety.  Electronically signed by Nilam Starr RN on 9/25/23 at 9:12 PM EDT

## 2023-09-26 NOTE — PLAN OF CARE
Problem: Discharge Planning  Goal: Discharge to home or other facility with appropriate resources  Outcome: Progressing     Problem: Pain  Goal: Verbalizes/displays adequate comfort level or baseline comfort level  Outcome: Progressing     Problem: Skin/Tissue Integrity  Goal: Absence of new skin breakdown  Description: 1. Monitor for areas of redness and/or skin breakdown  2. Assess vascular access sites hourly  3. Every 4-6 hours minimum:  Change oxygen saturation probe site  4. Every 4-6 hours:  If on nasal continuous positive airway pressure, respiratory therapy assess nares and determine need for appliance change or resting period.   Outcome: Progressing     Problem: ABCDS Injury Assessment  Goal: Absence of physical injury  Outcome: Progressing     Problem: Neurosensory - Adult  Goal: Achieves stable or improved neurological status  Outcome: Progressing  Goal: Achieves maximal functionality and self care  Outcome: Progressing     Problem: Skin/Tissue Integrity - Adult  Goal: Skin integrity remains intact  Outcome: Progressing  Flowsheets (Taken 9/25/2023 1907)  Skin Integrity Remains Intact: Monitor for areas of redness and/or skin breakdown     Problem: Musculoskeletal - Adult  Goal: Return mobility to safest level of function  Outcome: Progressing  Flowsheets (Taken 9/25/2023 1907)  Return Mobility to Safest Level of Function: Assist with transfers and ambulation using safe patient handling equipment as needed  Goal: Maintain proper alignment of affected body part  Outcome: Progressing  Goal: Return ADL status to a safe level of function  Outcome: Progressing     Problem: Infection - Adult  Goal: Absence of infection at discharge  Outcome: Progressing     Problem: Chronic Conditions and Co-morbidities  Goal: Patient's chronic conditions and co-morbidity symptoms are monitored and maintained or improved  Outcome: Progressing     Problem: Safety - Adult  Goal: Free from fall injury  Outcome: Progressing

## 2023-09-26 NOTE — PROGRESS NOTES
Physical Therapy  Facility/Department: [address] REHAB  Rehabilitation Physical Therapy Treatment Note    NAME: Jameson Flores  : 1970 (50 y.o.)  MRN: [id number]  CODE STATUS: Full Code    Date of Service: 23       Restrictions:  Restrictions/Precautions: Fall Risk, Aspiration Risk  Position Activity Restriction  Other position/activity restrictions: soft & bite sized diet w/ nectar thick liquids, total feed, signficant L neglect and L amando body is flaccid    Pertinent medical information:  Additional Pertinent Hx: per Dr. Quinn Lat - Patient is a 49 yo M with pmh NSTEMI () RBBB, HTN, HLD, CKD III, and chronic pancreatitis who initially presented to  on 2023 with acute onset left side weakness. He had been complaining of headache throughout the day prior to this. CT head revealed acute basal ganglia and right frontal lobe intraparenchymal hemorrhage w/ significant mass effect on the lateral ventricles R>L and leftward midline shift. CTA negative for vascular malformation. Systolic blood pressure was reportedly in the 250s on arrival. He received 500ml hypertonic saline, Keppra load, and was started on nicardipine gtt prior to being admitted to NSICU. He was started on PO HTN meds and weaned off nicardipine gttt on . Repeat [facility]  was stable. Course was complicated by dysphagia, difficult to control HTN. Now presents to ARU with impaired mobility, self-care, and cognition below his baseline. Currently, patient reports intermittent headache. He has severe left side weakness and decreased sensation left side. He has slurred speech. He has intermittent blurred vision. He is motivated to start inpatient rehabilitation program.       SUBJECTIVE  Subjective  Subjective: patient supine in bed with SLP, [facility], assisting with eating and friend, Quyen Lucero, present. Patient continues to be labile this AM.  He had no complaints of pain but reports he did get his tramadol earlier.   Pain: none requires two person assist and berny plus was utilized to ensure safe transfer in and out of shower. He was very fatigued after shower and easily falling asleep once positioned in the recliner. He states he could safe with electric razor, but could not stay awake to do so. Patient continues to demonstrate left neglect and very poor insight into his deficits. Patient continues to function well below his baseline and will benefit from continued skilled PT to address impaired cognition, balance, strength, ROM, functional mobility, and left side neglect. Activity Tolerance: Patient limited by fatigue;Patient limited by endurance  Discharge Recommendations: Continue to assess pending progress; Patient would benefit from continued therapy after discharge; Therapy recommended at discharge  PT Equipment Recommendations  Other: continue to assess pending patient progress    Goals  Patient Goals   Patient Goals : \" get back to skating\"  Short Term Goals  Time Frame for Short Term Goals: 2 weeks  Short Term Goal 1: Perform bed mobility with max x 1 assist.  Short Term Goal 2: Perform transfers with mod x 1  Short Term Goal 3: Demonstrate good static sitting balance with supervision  Short Term Goal 4: Demonstrate good static standing balance with mod x 1 with UE support  Short Term Goal 5: Propel wheelchair 150ft with supervision for safety. Long Term Goals  Time Frame for Long Term Goals : 4 weeks  Long Term Goal 1: Perform bed mobility with min assist  Long Term Goal 2: Perform transfers with min assist  Long Term Goal 3: Demonstrate good static and dynamic balance with min assist.  Long Term Goal 4: Propel wheelchair 200ft with supervision for safety. Long Term Goal 5: Ambulate 10ft with RUE support on railing with one person assist and wheelchair follow.     PLAN OF CARE/SAFETY  Physcial Therapy Plan  General Plan:  minutes of therapy at least 5 out of 7 days a week  Days Per Week: 5 Days  Therapy Duration: 4

## 2023-09-26 NOTE — PROGRESS NOTES
oral care, his friend Nicci June is present to assist w/ shaving however razor pops off  Upper Extremity Bathing  Assistance Level: Maximum assistance  Skilled Clinical Factors: seated on BSC in shower, PT supporting L amando body to sit in midline; he needed step by step instruction to wash L UE, OT held it up, he was not thorough; OT placed washcloth on his chest, prompting required to initiate--poor attention to task  Lower Extremity Bathing  Assistance Level: Maximum assistance;Dependent  Skilled Clinical Factors: he washed top of R thigh & front privates briefly; OT washed/rinsed/dried shins<>feet and buttocks while seated on BSC  Upper Extremity Dressing  Assistance Level: Dependent; Requires x 2 assistance  Skilled Clinical Factors: total A of 2 while seated on BSC,  poor balance, L neglect. Did not help using R arm despite cues--poor proprioception  Lower Extremity Dressing  Assistance Level: Requires x 2 assistance;Dependent  Skilled Clinical Factors: OT lifted L foot to don brief & pants, he tries to place  R foot in while OT and pulled up to his knees, he pulled pants to thighs, pt needed max A of 2 to stand up inside berny plus to manage clothing over hips  Putting On/Taking Off Footwear  Assistance Level: Dependent  Skilled Clinical Factors: required total A to don non skid socks, L LE is flaccid & required lifting assistance  Toileting  Assistance Level: Dependent  Skilled Clinical Factors: he needed A to manage clothes, brief is soiled after being incont of urine; OT  changed his brief w/ total A while seated on BSC, used berny plus w/ total A of 2-3 to manage clothing over hips  Toilet Transfers  Assistance Level: Dependent; Requires x 2 assistance  Skilled Clinical Factors: used berny plus w/ A of 2-3 for on/off BSC  Tub/Shower Transfers  Type: Shower  Assistance Level: Dependent; Requires x 2 assistance  Skilled Clinical Factors: used BSC in shower, required A of 2-3 using berny plus          Functional Term Goals : by 3-4 wks pt will complete  Long Term Goal 1: UB dressing w/ min A  Long Term Goal 2: LB dressing w/ Min A  Long Term Goal 3: bathing tasks w/ min A using LH sponge &  amando tech  Long Term Goal 4: toilet tasks w/ min A  Long Term Goal 5: w/c mobility x 50 ft w/ SBA using R UE & LE  Long Term Goal 6: participate in OT/PT co tx to address sitting & standing balance, NDT, WBing & gait training to improve L amando body  Long Term Goal 7: pt will sit on EOB x 10 minutes w/ CGA &  visually attend to midline<>L w/ 75% accuracy during ADLs, fxl act                   Therapy Time   Individual Concurrent PM CO Tx Co-treatment   Time In 0900    1400 0815   Time Out 0915    1445 0900   Minutes 15    45 45   Timed Code Treatment Minutes: [facility], OTR/L #[id number]

## 2023-09-26 NOTE — PROGRESS NOTES
strategies for improved swallowing safety. Max cues for rationale for:   Upright positioning  Small sips/bites  Lingual sweep for clearance of oral pocketing  Rate of intake   Ongoing ed        Pt will demonstrate improved swallow response via laryngeal/pharyngeal ex; sensory stimulatino for therapy trials of easy to chew food and/or thin liquids; AND demonstrate improved ease of bolus control for reduced severity /frequence of oral pocketing Ongoing discussion of NMES Vital Stimulation-pt reporting someone is bringing in shaving equipment from home    Easy to chew:   no anterior loss before/during bolus presp;   no s/s of aspiration. No post swallow pt complaints  Pt took >50% of serving entree item  oral pocketing which pt could not clear with external prompting with 2 clearance swallows ; oral care to remove Not targeted directly     OM speech and Cognitive-Linguistic Evaluation Goals: Pt goal it to go home and get some sleep.  Pt was agreeable to treatment poc to address strategies for visual attention for functional reading/writing; attention/memory and VPS tasks     Therapy working toward pt goal Therapy working toward pt goal   Goal 1: Pt will demonstrate participation in ongoing assessment of visual spatial organization and strategies for functional concrete reading at phrase/sentence level and for concrete form completion wtih set up and max cues Targeted with self feeding:   Limited due to time limitations-SLP at 745 am to 815  Despite am tray being delivered prior to SLP session; pt was not set up for am meal  SLP completed majority of scooping etc   Visual attentino for reading using [address] for left visual atteniton:     Left visual marked utilzited (color /tapping etc to for left visual attention for 8x11 page for visual language processing/and search and find : max cues   Goal 2: Pt will demonstrate improved focused attention for rule application for >5 task trials without redirect to task instruction Kari Benne application and/or sustain one step command rule application for 30 seconds wtih max set up and max verbal cues Task attention for po: Met goal of >5 task trials without need for re-direct for po intake  Goal not met for sustained use of compensatory swallow strategies (ie clearance swallow for >5 reps without re-direct); Max reminders  Topic maintenance for >5 exchanges:   Goal met   Left body scheme for 5 reps after set up;conditioning:   Max cues (verbal/physical)   Task attention  Rule application after set up and conditioning: mas cues for every 1-3 task trials    Visual attention to speaker; one step command with duration and counter utilized for most optimal sustained attention for rule application     Goal 3: Pt will demonstrate improved insight for  following right/left spatial one step command >90% with max cues   R/L for body scheme: max external cues (verbal/tactile)    R/l for one step commands for repositioning in bed: max visual/physical cues. Pt isdependent       Dependent on external cues    Persistent marked deficits in body/space and safety / left amando; safety / left visual attention       Goal 4: Pt will maintain audible/intelligible connected speech with >90% articulatory precision with use of compensatory strategies PRN     Goal met maintianedthis am session Goal met maintianedthis am session   Other areas targeted:     Education:   Ongoing pt ed Ongoing pt ed   Safety Devices: [x] Call light within reach  [x] Chair alarm activated  [x] Bed alarm activated  [x] Other: PT/OT here to assist with completing am meal [] Call light within reach  [] Chair alarm activated  [] Bed alarm activated  [x] Other:transport returned pt to room   Progress Assessment: 9 /22/2023: Pt more alert in am session.  In the pm session pt was more lethargic with increase confused verbalizations  9/25/2023: Lethargy and max cues for goal directed behavior in pm session when compared to more alert am

## 2023-09-27 LAB
ANION GAP SERPL CALCULATED.3IONS-SCNC: 9 MMOL/L (ref 3–16)
BASOPHILS # BLD: 0.1 K/UL (ref 0–0.2)
BASOPHILS NFR BLD: 0.7 %
BUN SERPL-MCNC: 42 MG/DL (ref 7–20)
CALCIUM SERPL-MCNC: 9.4 MG/DL (ref 8.3–10.6)
CHLORIDE SERPL-SCNC: 105 MMOL/L (ref 99–110)
CO2 SERPL-SCNC: 22 MMOL/L (ref 21–32)
CREAT SERPL-MCNC: 1.5 MG/DL (ref 0.9–1.3)
DEPRECATED RDW RBC AUTO: 12.5 % (ref 12.4–15.4)
EOSINOPHIL # BLD: 0.1 K/UL (ref 0–0.6)
EOSINOPHIL NFR BLD: 1 %
GFR SERPLBLD CREATININE-BSD FMLA CKD-EPI: 56 ML/MIN/{1.73_M2}
GLUCOSE SERPL-MCNC: 116 MG/DL (ref 70–99)
HCT VFR BLD AUTO: 40.1 % (ref 40.5–52.5)
HGB BLD-MCNC: 13.4 G/DL (ref 13.5–17.5)
LYMPHOCYTES # BLD: 1.9 K/UL (ref 1–5.1)
LYMPHOCYTES NFR BLD: 16.8 %
MCH RBC QN AUTO: 32 PG (ref 26–34)
MCHC RBC AUTO-ENTMCNC: 33.4 G/DL (ref 31–36)
MCV RBC AUTO: 96 FL (ref 80–100)
MONOCYTES # BLD: 0.7 K/UL (ref 0–1.3)
MONOCYTES NFR BLD: 5.9 %
NEUTROPHILS # BLD: 8.5 K/UL (ref 1.7–7.7)
NEUTROPHILS NFR BLD: 75.6 %
PLATELET # BLD AUTO: 310 K/UL (ref 135–450)
PMV BLD AUTO: 7.9 FL (ref 5–10.5)
POTASSIUM SERPL-SCNC: 5.1 MMOL/L (ref 3.5–5.1)
RBC # BLD AUTO: 4.18 M/UL (ref 4.2–5.9)
SODIUM SERPL-SCNC: 136 MMOL/L (ref 136–145)
WBC # BLD AUTO: 11.2 K/UL (ref 4–11)

## 2023-09-27 PROCEDURE — 1280000000 HC REHAB R&B

## 2023-09-27 PROCEDURE — 97112 NEUROMUSCULAR REEDUCATION: CPT

## 2023-09-27 PROCEDURE — 97129 THER IVNTJ 1ST 15 MIN: CPT

## 2023-09-27 PROCEDURE — 97530 THERAPEUTIC ACTIVITIES: CPT

## 2023-09-27 PROCEDURE — 6370000000 HC RX 637 (ALT 250 FOR IP): Performed by: PHYSICAL MEDICINE & REHABILITATION

## 2023-09-27 PROCEDURE — 97542 WHEELCHAIR MNGMENT TRAINING: CPT

## 2023-09-27 PROCEDURE — 97535 SELF CARE MNGMENT TRAINING: CPT

## 2023-09-27 PROCEDURE — 97130 THER IVNTJ EA ADDL 15 MIN: CPT

## 2023-09-27 PROCEDURE — 2580000003 HC RX 258: Performed by: PHYSICAL MEDICINE & REHABILITATION

## 2023-09-27 PROCEDURE — 85025 COMPLETE CBC W/AUTO DIFF WBC: CPT

## 2023-09-27 PROCEDURE — 94760 N-INVAS EAR/PLS OXIMETRY 1: CPT

## 2023-09-27 PROCEDURE — 36415 COLL VENOUS BLD VENIPUNCTURE: CPT

## 2023-09-27 PROCEDURE — 97110 THERAPEUTIC EXERCISES: CPT

## 2023-09-27 PROCEDURE — 92526 ORAL FUNCTION THERAPY: CPT

## 2023-09-27 PROCEDURE — 80048 BASIC METABOLIC PNL TOTAL CA: CPT

## 2023-09-27 RX ADMIN — SODIUM CHLORIDE: 9 INJECTION, SOLUTION INTRAVENOUS at 20:42

## 2023-09-27 RX ADMIN — CLONIDINE HYDROCHLORIDE 0.3 MG: 0.1 TABLET ORAL at 15:13

## 2023-09-27 RX ADMIN — BUTALBITAL, ACETAMINOPHEN AND CAFFEINE 1 TABLET: 325; 50; 40 TABLET ORAL at 22:51

## 2023-09-27 RX ADMIN — SENNOSIDES AND DOCUSATE SODIUM 1 TABLET: 50; 8.6 TABLET ORAL at 07:55

## 2023-09-27 RX ADMIN — FAMOTIDINE 20 MG: 20 TABLET ORAL at 21:08

## 2023-09-27 RX ADMIN — TRAMADOL HYDROCHLORIDE 50 MG: 50 TABLET ORAL at 05:13

## 2023-09-27 RX ADMIN — HYDRALAZINE HYDROCHLORIDE 100 MG: 50 TABLET, FILM COATED ORAL at 21:08

## 2023-09-27 RX ADMIN — SERTRALINE HYDROCHLORIDE 25 MG: 25 TABLET ORAL at 07:56

## 2023-09-27 RX ADMIN — ATORVASTATIN CALCIUM 40 MG: 40 TABLET, FILM COATED ORAL at 07:37

## 2023-09-27 RX ADMIN — TRAMADOL HYDROCHLORIDE 50 MG: 50 TABLET ORAL at 12:33

## 2023-09-27 RX ADMIN — SODIUM CHLORIDE 1000 ML: 9 INJECTION, SOLUTION INTRAVENOUS at 08:04

## 2023-09-27 RX ADMIN — BUTALBITAL, ACETAMINOPHEN AND CAFFEINE 1 TABLET: 325; 50; 40 TABLET ORAL at 17:13

## 2023-09-27 RX ADMIN — CARVEDILOL 25 MG: 25 TABLET, FILM COATED ORAL at 17:14

## 2023-09-27 RX ADMIN — HYDRALAZINE HYDROCHLORIDE 100 MG: 50 TABLET, FILM COATED ORAL at 05:13

## 2023-09-27 RX ADMIN — TIZANIDINE 2 MG: 4 TABLET ORAL at 22:42

## 2023-09-27 RX ADMIN — TIZANIDINE 2 MG: 4 TABLET ORAL at 08:06

## 2023-09-27 RX ADMIN — Medication 3 MG: at 21:00

## 2023-09-27 RX ADMIN — Medication 10 ML: at 20:50

## 2023-09-27 RX ADMIN — HYDRALAZINE HYDROCHLORIDE 100 MG: 50 TABLET, FILM COATED ORAL at 15:13

## 2023-09-27 RX ADMIN — BUTALBITAL, ACETAMINOPHEN AND CAFFEINE 1 TABLET: 325; 50; 40 TABLET ORAL at 07:32

## 2023-09-27 RX ADMIN — CLONIDINE HYDROCHLORIDE 0.3 MG: 0.1 TABLET ORAL at 07:59

## 2023-09-27 RX ADMIN — SENNOSIDES AND DOCUSATE SODIUM 1 TABLET: 50; 8.6 TABLET ORAL at 21:08

## 2023-09-27 RX ADMIN — CARVEDILOL 25 MG: 25 TABLET, FILM COATED ORAL at 07:57

## 2023-09-27 RX ADMIN — AMLODIPINE BESYLATE 10 MG: 10 TABLET ORAL at 08:05

## 2023-09-27 RX ADMIN — FAMOTIDINE 20 MG: 20 TABLET ORAL at 08:05

## 2023-09-27 RX ADMIN — TRAMADOL HYDROCHLORIDE 50 MG: 50 TABLET ORAL at 20:46

## 2023-09-27 RX ADMIN — CLONIDINE HYDROCHLORIDE 0.3 MG: 0.1 TABLET ORAL at 21:08

## 2023-09-27 RX ADMIN — TIZANIDINE 2 MG: 4 TABLET ORAL at 17:13

## 2023-09-27 ASSESSMENT — PAIN DESCRIPTION - DESCRIPTORS
DESCRIPTORS: ACHING
DESCRIPTORS: ACHING;DISCOMFORT
DESCRIPTORS: ACHING
DESCRIPTORS: ACHING;DISCOMFORT
DESCRIPTORS: ACHING;DISCOMFORT
DESCRIPTORS: ACHING

## 2023-09-27 ASSESSMENT — PAIN DESCRIPTION - LOCATION
LOCATION: NECK
LOCATION: HEAD

## 2023-09-27 ASSESSMENT — PAIN SCALES - GENERAL
PAINLEVEL_OUTOF10: 0
PAINLEVEL_OUTOF10: 0
PAINLEVEL_OUTOF10: 8
PAINLEVEL_OUTOF10: 0
PAINLEVEL_OUTOF10: 8
PAINLEVEL_OUTOF10: 8
PAINLEVEL_OUTOF10: 9
PAINLEVEL_OUTOF10: 7
PAINLEVEL_OUTOF10: 8
PAINLEVEL_OUTOF10: 7

## 2023-09-27 ASSESSMENT — PAIN SCALES - WONG BAKER
WONGBAKER_NUMERICALRESPONSE: 0
WONGBAKER_NUMERICALRESPONSE: 0
WONGBAKER_NUMERICALRESPONSE: 2
WONGBAKER_NUMERICALRESPONSE: 0
WONGBAKER_NUMERICALRESPONSE: 2

## 2023-09-27 ASSESSMENT — PAIN DESCRIPTION - PAIN TYPE
TYPE: ACUTE PAIN
TYPE: ACUTE PAIN

## 2023-09-27 ASSESSMENT — PAIN DESCRIPTION - ONSET
ONSET: ON-GOING
ONSET: ON-GOING

## 2023-09-27 ASSESSMENT — PAIN DESCRIPTION - ORIENTATION
ORIENTATION: RIGHT
ORIENTATION: POSTERIOR
ORIENTATION: POSTERIOR
ORIENTATION: RIGHT
ORIENTATION: RIGHT
ORIENTATION: POSTERIOR

## 2023-09-27 ASSESSMENT — PAIN DESCRIPTION - FREQUENCY
FREQUENCY: CONTINUOUS
FREQUENCY: CONTINUOUS

## 2023-09-27 ASSESSMENT — PAIN - FUNCTIONAL ASSESSMENT
PAIN_FUNCTIONAL_ASSESSMENT: PREVENTS OR INTERFERES SOME ACTIVE ACTIVITIES AND ADLS
PAIN_FUNCTIONAL_ASSESSMENT: ACTIVITIES ARE NOT PREVENTED
PAIN_FUNCTIONAL_ASSESSMENT: PREVENTS OR INTERFERES WITH ALL ACTIVE AND SOME PASSIVE ACTIVITIES
PAIN_FUNCTIONAL_ASSESSMENT: ACTIVITIES ARE NOT PREVENTED
PAIN_FUNCTIONAL_ASSESSMENT: ACTIVITIES ARE NOT PREVENTED

## 2023-09-27 NOTE — PLAN OF CARE
Problem: Discharge Planning  Goal: Discharge to home or other facility with appropriate resources  Outcome: Progressing  Flowsheets (Taken 9/27/2023 0729)  Discharge to home or other facility with appropriate resources:   Identify barriers to discharge with patient and caregiver   Arrange for needed discharge resources and transportation as appropriate   Identify discharge learning needs (meds, wound care, etc)   Refer to discharge planning if patient needs post-hospital services based on physician order or complex needs related to functional status, cognitive ability or social support system     Problem: Pain  Goal: Verbalizes/displays adequate comfort level or baseline comfort level  9/27/2023 1238 by Tegan Aguirre, RN  Outcome: Progressing  Flowsheets (Taken 9/27/2023 0729)  Verbalizes/displays adequate comfort level or baseline comfort level:   Encourage patient to monitor pain and request assistance   Assess pain using appropriate pain scale   Administer analgesics based on type and severity of pain and evaluate response   Implement non-pharmacological measures as appropriate and evaluate response     Problem: Skin/Tissue Integrity  Goal: Absence of new skin breakdown  Description: 1. Monitor for areas of redness and/or skin breakdown  2. Assess vascular access sites hourly  3. Every 4-6 hours minimum:  Change oxygen saturation probe site  4. Every 4-6 hours:  If on nasal continuous positive airway pressure, respiratory therapy assess nares and determine need for appliance change or resting period.   9/27/2023 1238 by Tegan Aguirre, RN  Outcome: Progressing     Problem: ABCDS Injury Assessment  Goal: Absence of physical injury  Outcome: Progressing  Flowsheets (Taken 9/27/2023 1001)  Absence of Physical Injury: Implement safety measures based on patient assessment

## 2023-09-27 NOTE — PROGRESS NOTES
Physical Therapy  Facility/Department: [address] REHAB  Rehabilitation Physical Therapy Treatment Note    NAME: Jael Suazo  : 1970 (50 y.o.)  MRN: [id number]  CODE STATUS: Full Code    Date of Service: 23       Restrictions:  Restrictions/Precautions: Fall Risk, Aspiration Risk  Position Activity Restriction  Other position/activity restrictions: soft & bite sized diet w/ nectar thick liquids, total feed, signficant L neglect and L amando body is flaccid     Pertinent medical information:  Additional Pertinent Hx: per Dr. Rafaela Casillas - Patient is a 51 yo M with pmh NSTEMI () RBBB, HTN, HLD, CKD III, and chronic pancreatitis who initially presented to  on 2023 with acute onset left side weakness. He had been complaining of headache throughout the day prior to this. CT head revealed acute basal ganglia and right frontal lobe intraparenchymal hemorrhage w/ significant mass effect on the lateral ventricles R>L and leftward midline shift. CTA negative for vascular malformation. Systolic blood pressure was reportedly in the 250s on arrival. He received 500ml hypertonic saline, Keppra load, and was started on nicardipine gtt prior to being admitted to NSICU. He was started on PO HTN meds and weaned off nicardipine gttt on . Repeat [facility]  was stable. Course was complicated by dysphagia, difficult to control HTN. Now presents to ARU with impaired mobility, self-care, and cognition below his baseline. Currently, patient reports intermittent headache. He has severe left side weakness and decreased sensation left side. He has slurred speech. He has intermittent blurred vision. He is motivated to start inpatient rehabilitation program.    SUBJECTIVE  Subjective  Subjective: Patient arrived in wheelchair following OT session with Bev Burger. Patient was observed in OT having great difficulty staying awake. He reports no pain but states he is very tired.  Patient states he believes he went to the [facility]

## 2023-09-27 NOTE — PROGRESS NOTES
ACUTE REHAB UNIT  SPEECH/LANGUAGE PATHOLOGY      [x] Daily  [] Weekly Care Conference Note  [] Discharge    Patient:Jono Ramos Child      BFO:[birth year]  RKD:[id number]  Rehab Dx/Hx: Nontraumatic intracranial hemorrhage ([address]) [I62.9]    Precautions: Fall risk; left amando; visual deficits; cognitive deficits; dysphagia with aspiration risk  Home situation: Lives with family  ST Dx: [] Aphasia  [x] Dysarthria  [] Apraxia   [x] Oropharyngeal dysphagia [x] Cognitive Impairment  [] Other:   Initial Speech Therapy Assessment Diagnosis:   1. Cognitive Diagnosis: pt demonstrating mild to marked cognitive -linguistic deficits in domains of sustained/focused attention; left visual attention; insight/PS/VPS for safety ; working memory and STM; reasoning and math language. Pt with visual deficits characterized by poor attention to the left; left body neglect. Pt lethargy noted but pt was able to both physically / verbally actively participate in varied tasks. Flat affect and overall poor sustained visual attention even when right of midline. Problems wtih distractibility/task extinction; poor body awareness; safety awareness. Will initiate therapy and target attention; working memory and safety insight; and visual language function (using visual sscanning strategies for the latter)  2. Speech Diagnosis: Dysarthria features most symptomatic as pt fatigues with increase in phonemic distortions 2/2 to left om weakness which is exacerbated when fatigued. Pt is able to achieve audible/intelligible connected speech  3. Communication Diagnosis: Auditory processing is most optimal with concrete , short questions/statements and simple one step commands. Pt with attention/working memorya nd concern for complex auditory processing deficits. Visual language processing is markedly reduced due to visual inattention deficits and reduced pt insight.  Verbal expression is functional for conveying needs/wants; concrete confrontation naming and

## 2023-09-27 NOTE — PLAN OF CARE
Problem: Pain  Goal: Verbalizes/displays adequate comfort level or baseline comfort level  Outcome: Progressing     Problem: Skin/Tissue Integrity  Goal: Absence of new skin breakdown  Description: 1. Monitor for areas of redness and/or skin breakdown  2. Assess vascular access sites hourly  3. Every 4-6 hours minimum:  Change oxygen saturation probe site  4. Every 4-6 hours:  If on nasal continuous positive airway pressure, respiratory therapy assess nares and determine need for appliance change or resting period.   Outcome: Progressing     Problem: Skin/Tissue Integrity - Adult  Goal: Skin integrity remains intact  Outcome: Progressing     Problem: Musculoskeletal - Adult  Goal: Return mobility to safest level of function  Outcome: Progressing     Problem: Infection - Adult  Goal: Absence of infection at discharge  Outcome: Progressing     Problem: Nutrition Deficit:  Goal: Optimize nutritional status  Outcome: Progressing   Assess nutritional status and recommend course of action   Monitor oral intake, labs, and treatment plans   Recommend appropriate diets, oral nutritional supplements, and vitamin/mineral supplements   Recommend, monitor, and adjust tube feedings and TPN/PPN based on assessed needs   Provide specific nutrition education to patient or family as appropriate

## 2023-09-27 NOTE — PROGRESS NOTES
Air mattress ordered and placed in room d/t Alonso scale score of 14. Education provided to patient. Call light within reach. Care ongoing.  Electronically signed by Mari English RN on 9/27/2023 at 1:07 PM

## 2023-09-27 NOTE — PROGRESS NOTES
This is a 50 y.o.  male admitted on 9/19/2023 with Nontraumatic intracranial hemorrhage ([address]) [I62.9]. A&O X 4. Active bowel sounds. Last BM 9/25/2023. Patient can be incontinent of bladder at times. Depends on. He uses the urinal with assistance. He on a dysphagia: soft and bite sized, nectar thick liquid. Medication taken whole, one at a time with applesauce. Skin is intact. Ultrasound guided IV placed this shift. PIV to the right wrist, IV fluid infusing at 75 ml/hr. Transfers with [facility]. HS medication given. Tolerated well. Call light and bedside table within reach. Patient instructed to call if he needs anything.

## 2023-09-27 NOTE — PROGRESS NOTES
Occupational Therapy  Facility/Department: [facility]  REH[facility]  Rehabilitation Occupational Therapy Daily Treatment Note    Date: 23  Patient Name: Moe Coelho       Room: W2T-3513/3814-37  MRN: [id number]  Account: [de-identified]   : 1970  (50 y.o.) Gender: male            PM session: pt seated in high back w/c, leaning to R side, L 1/2 lap tray, head turned to the R. Pt does not visually track to midline over to L side during session. Pt states \"I have to pee\" but by the time OT, PT & PT/S positioned him in berny plus he states \"I pissed on myself. \" Pt required total A of 2-3 to change his brief, clean teresa areas & don new clothing--he would not engage in cleaning his front private area. Pt needed min A of 2 for standing inside berny plus when he engages his R side & core muscles. Pt placed into high back w/c with goal for w/c mobility. He tried using R hand and foot to propel down hallway, given hand over hand demo by PT/S, but would only push 3-5 times and stop; gets distracted, dissociative conversations, some paranoia about \"you guys are out to get me. \" He went a total of 90 ft w/ several rest breaks and competitive coaxing--he had to guess how many seconds it would take him to propel over threshold x 5 ft. Pt needed max A of 2-3 using berny plus from w/c into the bed, max A of 2 for sit to supine--did not engage R side/strong side and needed A to guide trunk and LEs into bed; positioned L UE on pillow, call light on R side, bed alarm activated. Tx time: 45 min, cont w/ POC to decrease caregiver burden w/ ADLs, improve sitting & standing balance. Desiree Jalloh, OTR/L #[id number]         Past Medical History:  has a past medical history of CKD (chronic kidney disease), Headache, HLD (hyperlipidemia), Hypertension, Left-sided weakness, NSTEMI (non-ST elevated myocardial infarction) ([address]), and Pancreatitis.   Past Surgical History:   has a past surgical history that includes

## 2023-09-27 NOTE — PROGRESS NOTES
Patient admitted to rehab with nontraumatic intracranial hemorrhage. A/Ox4. Transfers with berny plus x2. Mobility restrictions: flaccid left side. On soft and bite sized with nectar thick liquids diet, tolerating well. Medications taken whole with pudding followed by nectar thick liquids. On none for DVT prophylaxis. Skin: intact. Oxygen: RA. LDA: PIV right wrist. Has been occasionally incontinent of bowel and occasionally incontinent of bladder. LBM 9/25/23. Chair/bed alarms in use and call light in reach. Will monitor for safety.  Electronically signed by Marisol Hernández RN on 9/27/2023 at 12:43 PM

## 2023-09-28 ENCOUNTER — APPOINTMENT (OUTPATIENT)
Dept: GENERAL RADIOLOGY | Age: 53
DRG: 057 | End: 2023-09-28
Attending: PHYSICAL MEDICINE & REHABILITATION
Payer: COMMERCIAL

## 2023-09-28 LAB
ANION GAP SERPL CALCULATED.3IONS-SCNC: 11 MMOL/L (ref 3–16)
BASOPHILS # BLD: 0.1 K/UL (ref 0–0.2)
BASOPHILS NFR BLD: 0.4 %
BUN SERPL-MCNC: 25 MG/DL (ref 7–20)
CALCIUM SERPL-MCNC: 9.7 MG/DL (ref 8.3–10.6)
CHLORIDE SERPL-SCNC: 103 MMOL/L (ref 99–110)
CO2 SERPL-SCNC: 22 MMOL/L (ref 21–32)
CREAT SERPL-MCNC: 1.2 MG/DL (ref 0.9–1.3)
DEPRECATED RDW RBC AUTO: 12.4 % (ref 12.4–15.4)
EOSINOPHIL # BLD: 0.1 K/UL (ref 0–0.6)
EOSINOPHIL NFR BLD: 1 %
GFR SERPLBLD CREATININE-BSD FMLA CKD-EPI: >60 ML/MIN/{1.73_M2}
GLUCOSE SERPL-MCNC: 155 MG/DL (ref 70–99)
HCT VFR BLD AUTO: 42.4 % (ref 40.5–52.5)
HGB BLD-MCNC: 14.4 G/DL (ref 13.5–17.5)
LACTATE BLDV-SCNC: 2 MMOL/L (ref 0.4–2)
LYMPHOCYTES # BLD: 1.7 K/UL (ref 1–5.1)
LYMPHOCYTES NFR BLD: 12.5 %
MCH RBC QN AUTO: 32.2 PG (ref 26–34)
MCHC RBC AUTO-ENTMCNC: 34 G/DL (ref 31–36)
MCV RBC AUTO: 94.7 FL (ref 80–100)
MONOCYTES # BLD: 0.6 K/UL (ref 0–1.3)
MONOCYTES NFR BLD: 4.5 %
NEUTROPHILS # BLD: 11.3 K/UL (ref 1.7–7.7)
NEUTROPHILS NFR BLD: 81.6 %
PLATELET # BLD AUTO: 251 K/UL (ref 135–450)
PLATELET BLD QL SMEAR: ADEQUATE
PMV BLD AUTO: 8.6 FL (ref 5–10.5)
POTASSIUM SERPL-SCNC: 4.3 MMOL/L (ref 3.5–5.1)
PROCALCITONIN SERPL IA-MCNC: 0.03 NG/ML (ref 0–0.15)
RBC # BLD AUTO: 4.48 M/UL (ref 4.2–5.9)
SLIDE REVIEW: ABNORMAL
SODIUM SERPL-SCNC: 136 MMOL/L (ref 136–145)
WBC # BLD AUTO: 13.9 K/UL (ref 4–11)

## 2023-09-28 PROCEDURE — 97530 THERAPEUTIC ACTIVITIES: CPT

## 2023-09-28 PROCEDURE — 2580000003 HC RX 258: Performed by: PHYSICAL MEDICINE & REHABILITATION

## 2023-09-28 PROCEDURE — 97535 SELF CARE MNGMENT TRAINING: CPT

## 2023-09-28 PROCEDURE — 71045 X-RAY EXAM CHEST 1 VIEW: CPT

## 2023-09-28 PROCEDURE — 80048 BASIC METABOLIC PNL TOTAL CA: CPT

## 2023-09-28 PROCEDURE — 83605 ASSAY OF LACTIC ACID: CPT

## 2023-09-28 PROCEDURE — 92526 ORAL FUNCTION THERAPY: CPT

## 2023-09-28 PROCEDURE — 94760 N-INVAS EAR/PLS OXIMETRY 1: CPT

## 2023-09-28 PROCEDURE — 97542 WHEELCHAIR MNGMENT TRAINING: CPT

## 2023-09-28 PROCEDURE — 97112 NEUROMUSCULAR REEDUCATION: CPT

## 2023-09-28 PROCEDURE — 85025 COMPLETE CBC W/AUTO DIFF WBC: CPT

## 2023-09-28 PROCEDURE — 97129 THER IVNTJ 1ST 15 MIN: CPT

## 2023-09-28 PROCEDURE — 87040 BLOOD CULTURE FOR BACTERIA: CPT

## 2023-09-28 PROCEDURE — 1280000000 HC REHAB R&B

## 2023-09-28 PROCEDURE — 36415 COLL VENOUS BLD VENIPUNCTURE: CPT

## 2023-09-28 PROCEDURE — 6370000000 HC RX 637 (ALT 250 FOR IP): Performed by: PHYSICAL MEDICINE & REHABILITATION

## 2023-09-28 PROCEDURE — 84145 PROCALCITONIN (PCT): CPT

## 2023-09-28 RX ADMIN — CLONIDINE HYDROCHLORIDE 0.3 MG: 0.1 TABLET ORAL at 20:40

## 2023-09-28 RX ADMIN — FAMOTIDINE 20 MG: 20 TABLET ORAL at 09:23

## 2023-09-28 RX ADMIN — HYDRALAZINE HYDROCHLORIDE 100 MG: 50 TABLET, FILM COATED ORAL at 13:45

## 2023-09-28 RX ADMIN — SENNOSIDES AND DOCUSATE SODIUM 1 TABLET: 50; 8.6 TABLET ORAL at 09:23

## 2023-09-28 RX ADMIN — TRAMADOL HYDROCHLORIDE 50 MG: 50 TABLET ORAL at 22:27

## 2023-09-28 RX ADMIN — CLONIDINE HYDROCHLORIDE 0.3 MG: 0.1 TABLET ORAL at 09:22

## 2023-09-28 RX ADMIN — SODIUM CHLORIDE: 9 INJECTION, SOLUTION INTRAVENOUS at 23:06

## 2023-09-28 RX ADMIN — SODIUM CHLORIDE: 9 INJECTION, SOLUTION INTRAVENOUS at 11:00

## 2023-09-28 RX ADMIN — CLONIDINE HYDROCHLORIDE 0.3 MG: 0.1 TABLET ORAL at 13:45

## 2023-09-28 RX ADMIN — ATORVASTATIN CALCIUM 40 MG: 40 TABLET, FILM COATED ORAL at 09:23

## 2023-09-28 RX ADMIN — TRAMADOL HYDROCHLORIDE 50 MG: 50 TABLET ORAL at 16:08

## 2023-09-28 RX ADMIN — AMLODIPINE BESYLATE 10 MG: 10 TABLET ORAL at 09:23

## 2023-09-28 RX ADMIN — HYDRALAZINE HYDROCHLORIDE 100 MG: 50 TABLET, FILM COATED ORAL at 20:40

## 2023-09-28 RX ADMIN — TRAMADOL HYDROCHLORIDE 50 MG: 50 TABLET ORAL at 05:13

## 2023-09-28 RX ADMIN — SERTRALINE HYDROCHLORIDE 25 MG: 25 TABLET ORAL at 09:23

## 2023-09-28 RX ADMIN — CARVEDILOL 25 MG: 25 TABLET, FILM COATED ORAL at 09:23

## 2023-09-28 RX ADMIN — HYDRALAZINE HYDROCHLORIDE 100 MG: 50 TABLET, FILM COATED ORAL at 05:12

## 2023-09-28 RX ADMIN — FAMOTIDINE 20 MG: 20 TABLET ORAL at 20:41

## 2023-09-28 ASSESSMENT — PAIN DESCRIPTION - LOCATION
LOCATION: HEAD
LOCATION: HEAD
LOCATION: NECK

## 2023-09-28 ASSESSMENT — PAIN DESCRIPTION - FREQUENCY: FREQUENCY: CONTINUOUS

## 2023-09-28 ASSESSMENT — PAIN SCALES - WONG BAKER
WONGBAKER_NUMERICALRESPONSE: 0

## 2023-09-28 ASSESSMENT — PAIN SCALES - GENERAL
PAINLEVEL_OUTOF10: 9
PAINLEVEL_OUTOF10: 5
PAINLEVEL_OUTOF10: 9

## 2023-09-28 ASSESSMENT — PAIN DESCRIPTION - ORIENTATION
ORIENTATION: POSTERIOR

## 2023-09-28 ASSESSMENT — PAIN DESCRIPTION - DESCRIPTORS
DESCRIPTORS: ACHING

## 2023-09-28 ASSESSMENT — PAIN DESCRIPTION - ONSET: ONSET: ON-GOING

## 2023-09-28 ASSESSMENT — PAIN DESCRIPTION - PAIN TYPE: TYPE: ACUTE PAIN

## 2023-09-28 ASSESSMENT — PAIN - FUNCTIONAL ASSESSMENT
PAIN_FUNCTIONAL_ASSESSMENT: PREVENTS OR INTERFERES SOME ACTIVE ACTIVITIES AND ADLS
PAIN_FUNCTIONAL_ASSESSMENT: PREVENTS OR INTERFERES SOME ACTIVE ACTIVITIES AND ADLS

## 2023-09-28 NOTE — PROGRESS NOTES
Patient admitted to rehab with nontraumatic intracranial hemorrhage. A/Ox4. Transfers with berny plus or maximove. Mobility restrictions: left side flaccid. On soft, bite size, nectar thick liqiuds diet, tolerating well. Medications taken whole, one at a time in pudding. Monitor closely, pockets pills. On nothing for DVT prophylaxis. Skin: intact. Oxygen: RA. LDA: right wrist. Has been continent of bowel and  of bladder. LBM 9-27. Chair/bed alarms in use and call light in reach. Will monitor for safety.

## 2023-09-28 NOTE — PROGRESS NOTES
Occupational Therapy  Facility/Department: [facility]  Rehabilitation Occupational Therapy Daily Treatment Note    Date: 23  Patient Name: Kris Thapa       Room: M7V-7411/7971-97  MRN: [id number]  Account: [de-identified]   : 1970  (50 y.o.) Gender: male                    Past Medical History:  has a past medical history of CKD (chronic kidney disease), Headache, HLD (hyperlipidemia), Hypertension, Left-sided weakness, NSTEMI (non-ST elevated myocardial infarction) ([address]), and Pancreatitis. Past Surgical History:   has a past surgical history that includes Appendectomy. Restrictions  Restrictions/Precautions: Fall Risk, Aspiration Risk  Other position/activity restrictions: soft & bite sized diet w/ nectar thick liquids, total feed, signficant L neglect and L amando body is flaccid  Equipment Used: Bed, Wheelchair    Subjective  Subjective: Pt seen bedside for OT treatment. Restrictions/Precautions: Fall Risk;Aspiration Risk             Objective     Cognition  Following Commands: Follows one step commands with increased time; Follows one step commands with repetition  Attention Span: Attends with cues to redirect  Memory: Decreased short term memory  Safety Judgement: Decreased awareness of need for assistance;Decreased awareness of need for safety  Problem Solving: Assistance required to generate solutions;Assistance required to identify errors made  Insights: Not aware of deficits  Initiation: Requires cues for some  Sequencing: Requires cues for some  Orientation  Orientation Level: Oriented X4         ADL  Grooming/Oral Hygiene  Skilled Clinical Factors: Was unable to find electric razor so unable to complete shaving this date. Visitor present will check with niece to see if they took it home. Upper Extremity Dressing  Assistance Level: Maximum assistance  Skilled Clinical Factors: Pt changed gowns to a snapped gown due to IV. Did not rachele shirt this date due to IV.   Lower Extremity training;Equipment evaluation, education, & procurement;Self-Care / ADL    Goals  Patient Goals   Patient goals : \"do what I normally do, walking outside\"  Short Term Goals  Time Frame for Short Term Goals: by 1-2 wks pt will complete  Short Term Goal 1: feeding & grooming tasks w/ SBA  Short Term Goal 2: UB dressing w/ mod A  Short Term Goal 3: LB dressing w/ mod A using A/E & amando tech  Short Term Goal 4: toilet tasks w/ Mod A  Short Term Goal 5: household transfers/SPT w/ mod A of 1  Long Term Goals  Time Frame for Long Term Goals : by 3-4 wks pt will complete  Long Term Goal 1: UB dressing w/ min A  Long Term Goal 2: LB dressing w/ Min A  Long Term Goal 3: bathing tasks w/ min A using LH sponge &  amando tech  Long Term Goal 4: toilet tasks w/ min A  Long Term Goal 5: w/c mobility x 50 ft w/ SBA using R UE & LE  Long Term Goal 6: participate in OT/PT co tx to address sitting & standing balance, NDT, WBing & gait training to improve L amando body  Long Term Goal 7: pt will sit on EOB x 10 minutes w/ CGA &  visually attend to midline<>L w/ 75% accuracy during ADLs, fxl act          Therapy Time   Individual Concurrent Group Co-treatment   Time In 0745         Time Out 0845         Minutes [address]   [address]

## 2023-09-28 NOTE — PROGRESS NOTES
Occupational Therapy  OCCUPATIONAL THERAPY  Progress Note   Second Session    [facility] Record Number: [id number]    Treatment Diagnosis: CVA    General  Chart Reviewed: Yes  Patient assessed for rehabilitation services?: Yes  Additional Pertinent Hx: pt is a 51 y/o male who arrived from [address] [place] [address] after having a stroke (R basal ganglia/frontal lobe intraparenchymal  hemorrhage w/ L midline shift). Had sudden onset of L sided weakness, facial droop & HA. Was found to be hypertensive to the 172'W systolic. PMhx: HTN HLD, NSTEMI, RBB, smoker, chronic pancreatitis, stage 3 CKD  Family / Caregiver Present: No  Referring Practitioner: Dr Dorina Weber  Diagnosis: CVA     Restrictions/Precautions  Restrictions/Precautions: Fall Risk, Aspiration Risk        Position Activity Restriction  Other position/activity restrictions: soft & bite sized diet w/ nectar thick liquids, total feed, signficant L neglect and L amando body is flaccid    Subjective: Patient supine in hospital bed, he is agreeable for OT/PT co tx to address sitting balance, standing w/ berny plus    Objective: sitting, standing balance, toilet tasks, bed mobility    Assessment: pt is more alert, able to follow simple commands. He required max A of 2 for supine<sit on EOB, OT positioned behind and supported L UE and neck position, while PT positioned each LE onto floor--his R side/strong side does not stay secure on floor, questionable proprioception issues. Able to hold head up less than 10 seconds once positioned in midline; able to turn his head to left to locate balloons in corner of room. He was able to sit on EOB x 10 minutes w/ mod A of 1, min A of another, pushing w/ R UE, OT placed L UE in [address] position; allowed pt to collapse forward & L to see if he had any righting reactions however this is absent. When OT asked \"what happened? \" He stated \"I guess I fell. \" Pt asked to use BSC over toilet, OT & PT used berny plus w/ total A of 2, OT secured his L UE but R foot would not remain planted on foot plate. He required total A of 2 to manage his clothing & clean buttocks after having large BM on toilet; pt had urinated on his shoes & clothes. After BM he stated \"I'm about to pass out\"--pt wiped his forehead & back of neck w/ cold wash cloth & provided VCs to keep his eyes open & head up. Used berny plus w/ total A of 2 from [facility] over toilet back to bed. Pt needed max A of 2 from sit to supine-- once in supine, OT & PT required total A to change and perform sponge bathing from waist down; he used bathing wipe to front privates once in bed w/ set up & cues. Required mod A of 2 w/ rolling side<>side in bed to don a clean brief. Pt left in bed, call light on R side, bed alarm on, bedside table on R. He reports fatigue & wanted to sleep at end of sesion.  Pt is functioning well below previous baseline & would benefit from OT services on rehab x 3 wks, will need to be co tx'ed to address balance & safe t/f    Safety Device - Type of devices:  []  All fall risk precautions in place [] Bed alarm in place  [] Call light within reach [] Chair alarm in place [] Positioning belt [] Gait belt [] Patient at risk for falls [] Left in bed [] Left in chair [] Telesitter in use [] Sitter present [] Nurse notified []  None      Therapy Time   Individual Co-treatment   Time In  1400   Time Out  1445   Minutes  45     Electronically signed by ROLANDA Mejias/L #[id number] on 9/28/2023 at 3:00 PM

## 2023-09-28 NOTE — FLOWSHEET NOTE
Patient admitted to ARU with Dx of nontraumatic intracranial hemorrhage. A/Ox4. Transfers by [facility] x 4 during daytime. Mobility restrictions: flaccid left side. On soft and bite sized with nectar thick liquids diet, tolerating well. Medications taken whole with pudding followed by nectar thick liquids. Not on any DVT prophylaxis d/t risk for bleeding. Skin: intact. Oxygen: RA. LDA: PIV right wrist. Cont. IVF Normal saline at 75ml/hr. Has been occasionally incontinent bowel and bladder. LBM 9/27/23. Had to use bedpan as there were only 3 staff members tonight. Chair/bed alarms in use and call light in reach. Will monitor for safety. Brother Olayinka Cha stayed at bedside till 10pm. Heating pad applied back of the neck for chronic pain. Had prn Tramadol and melatonin tonight. Continue to monitor for comfort.

## 2023-09-28 NOTE — PLAN OF CARE
Problem: Discharge Planning  Goal: Discharge to home or other facility with appropriate resources  9/27/2023 2149 by Sigrid Iqbal RN  Outcome: Progressing  9/27/2023 1238 by Mari English RN  Outcome: Progressing  Flowsheets (Taken 9/27/2023 6923)  Discharge to home or other facility with appropriate resources:   Identify barriers to discharge with patient and caregiver   Arrange for needed discharge resources and transportation as appropriate   Identify discharge learning needs (meds, wound care, etc)   Refer to discharge planning if patient needs post-hospital services based on physician order or complex needs related to functional status, cognitive ability or social support system     Problem: Pain  Goal: Verbalizes/displays adequate comfort level or baseline comfort level  9/27/2023 2149 by Sigrid Iqbal RN  Outcome: Progressing  9/27/2023 1238 by Mari English RN  Outcome: Progressing  Flowsheets  Taken 9/27/2023 1233  Verbalizes/displays adequate comfort level or baseline comfort level:   Encourage patient to monitor pain and request assistance   Assess pain using appropriate pain scale   Administer analgesics based on type and severity of pain and evaluate response   Implement non-pharmacological measures as appropriate and evaluate response  Taken 9/27/2023 0729  Verbalizes/displays adequate comfort level or baseline comfort level:   Encourage patient to monitor pain and request assistance   Assess pain using appropriate pain scale   Administer analgesics based on type and severity of pain and evaluate response   Implement non-pharmacological measures as appropriate and evaluate response     Problem: Skin/Tissue Integrity  Goal: Absence of new skin breakdown  Description: 1. Monitor for areas of redness and/or skin breakdown  2. Assess vascular access sites hourly  3. Every 4-6 hours minimum:  Change oxygen saturation probe site  4.   Every 4-6 hours:  If on nasal continuous positive airway pressure, respiratory therapy assess nares and determine need for appliance change or resting period.   9/27/2023 2149 by True Leung RN  Outcome: Progressing  9/27/2023 1238 by Josseline Diallo RN  Outcome: Progressing     Problem: ABCDS Injury Assessment  Goal: Absence of physical injury  9/27/2023 2149 by True Leung RN  Outcome: Progressing  9/27/2023 1238 by Josseline Diallo RN  Outcome: Progressing  Flowsheets (Taken 9/27/2023 1001)  Absence of Physical Injury: Implement safety measures based on patient assessment     Problem: Neurosensory - Adult  Goal: Achieves stable or improved neurological status  Outcome: Progressing  Goal: Achieves maximal functionality and self care  Outcome: Progressing     Problem: Skin/Tissue Integrity - Adult  Goal: Skin integrity remains intact  Outcome: Progressing  Flowsheets (Taken 9/27/2023 1001 by Josseline Diallo RN)  Skin Integrity Remains Intact: Monitor for areas of redness and/or skin breakdown     Problem: Musculoskeletal - Adult  Goal: Return mobility to safest level of function  Outcome: Progressing  Goal: Maintain proper alignment of affected body part  Outcome: Progressing  Goal: Return ADL status to a safe level of function  Outcome: Progressing     Problem: Infection - Adult  Goal: Absence of infection at discharge  Outcome: Progressing     Problem: Chronic Conditions and Co-morbidities  Goal: Patient's chronic conditions and co-morbidity symptoms are monitored and maintained or improved  9/27/2023 2149 by True Leung RN  Outcome: Progressing  9/27/2023 1238 by Josseline Diallo RN  Outcome: Progressing  Flowsheets (Taken 9/27/2023 0729)  Care Plan - Patient's Chronic Conditions and Co-Morbidity Symptoms are Monitored and Maintained or Improved: Monitor and assess patient's chronic conditions and comorbid symptoms for stability, deterioration, or improvement     Problem: Safety - Adult  Goal: Free from fall injury  9/27/2023 2149 by True Leung RN  Outcome:

## 2023-09-28 NOTE — PLAN OF CARE
Problem: Discharge Planning  Goal: Discharge to home or other facility with appropriate resources  9/28/2023 1028 by Shima Cam LPN  Outcome: Progressing  Flowsheets (Taken 9/28/2023 1028)  Discharge to home or other facility with appropriate resources: Identify barriers to discharge with patient and caregiver     Problem: Pain  Goal: Verbalizes/displays adequate comfort level or baseline comfort level  9/28/2023 1028 by Shima Cam LPN  Outcome: Progressing  Flowsheets (Taken 9/28/2023 1028)  Verbalizes/displays adequate comfort level or baseline comfort level:   Encourage patient to monitor pain and request assistance   Assess pain using appropriate pain scale   Administer analgesics based on type and severity of pain and evaluate response   Implement non-pharmacological measures as appropriate and evaluate response     Problem: Skin/Tissue Integrity  Goal: Absence of new skin breakdown  Description: 1. Monitor for areas of redness and/or skin breakdown  2. Assess vascular access sites hourly  3. Every 4-6 hours minimum:  Change oxygen saturation probe site  4. Every 4-6 hours:  If on nasal continuous positive airway pressure, respiratory therapy assess nares and determine need for appliance change or resting period.   9/28/2023 1028 by Shima Cam LPN  Outcome: Progressing     Problem: ABCDS Injury Assessment  Goal: Absence of physical injury  9/28/2023 1028 by Shima Cam LPN  Outcome: Progressing  Flowsheets (Taken 9/28/2023 1028)  Absence of Physical Injury: Implement safety measures based on patient assessment     Problem: Neurosensory - Adult  Goal: Achieves stable or improved neurological status  9/28/2023 1028 by Shima Cam LPN  Outcome: Progressing  Flowsheets (Taken 9/28/2023 1028)  Achieves stable or improved neurological status: Assess for and report changes in neurological status     Problem: Neurosensory - Adult  Goal: Achieves maximal functionality and self care  9/28/2023 1028 by Erlinda Segura

## 2023-09-28 NOTE — PROGRESS NOTES
will not turn head past midline to look fully to this left. He veers to the left and does not self correct. Difficulty gaining traction with right foot as he did not have a shoe on. PT Exercises  Exercise Treatment: AAROM for R LE AP, TKE, hip flex while sitting in high back w/c      ASSESSMENT/PROGRESS TOWARDS GOALS       Assessment  Assessment: Mr. \"Richard\" Tawanna Wells had poor alertness this AM and often fell asleep during session. He would not respond at times and required sternal rub once to get him to answer PT. He had great difficulty participating in therapy session due to fatigue level. Pt required constant cues to stay awake and participate, required hand over hand assist for wheelchair propulsion. Increased difficulty following directions this PM due to poor alertness. Patient continues to demonstrate left neglect and very poor insight into his deficits. Patient continues to function well below his baseline and will benefit from continued skilled PT to address impaired cognition, balance, strength, ROM, functional mobility, and left side neglect. Activity Tolerance: Patient tolerated treatment well  Discharge Recommendations: Continue to assess pending progress; Patient would benefit from continued therapy after discharge; Therapy recommended at discharge  PT Equipment Recommendations  Other: continue to assess pending patient progress    Goals  Patient Goals   Patient Goals : \" get back to skating\"  Short Term Goals  Time Frame for Short Term Goals: 2 weeks  Short Term Goal 1: Perform bed mobility with max x 1 assist.  Short Term Goal 2: Perform transfers with mod x 1  Short Term Goal 3: Demonstrate good static sitting balance with supervision  Short Term Goal 4: Demonstrate good static standing balance with mod x 1 with UE support  Short Term Goal 5: Propel wheelchair 150ft with supervision for safety.   Long Term Goals  Time Frame for Long Term Goals : 4 weeks  Long Term Goal 1: Perform bed mobility needed constant cues and assist to stay in standing position in [employer] until it was in position over toilet, then pt tried to stay standing even as berny plus was being lowered and pt needed to be guided to sit on toilet. Pt with large BM and urinated, urine leaked out between toilet and raised toilet seat causing soiling of pants, socks and shoes. Toileting D. Returned to bed with berny plus with continued assist and cues to maintain standing position, pt pulled R foot back so heel was hanging off of berny plus footplate. Sit to sup max A x 2, rolling to finish cleaning and changing clothing mod A x 2. Pt reported feeling light-headed on toilet after having large BM: BP once back in bed = 131/80.   Second Session Therapy Time     Individual Co-treatment   Time In  1400   Time Out  2711   Minutes  45      Electronically signed by Cuauhtemoc Ritter PT on 9/28/2023 at 3:05 PM

## 2023-09-28 NOTE — PROGRESS NOTES
concrete concept explanation. Pt is a poor historian. Written expression was functional for full name, ; PCP name; family member name. Pt wtih incomplete address (limited to street name/number). Max cues to compensate for visual deficits for form completion. Letter spacing for print more optimal than cursive. Legibilty more optimal with print versus cursive. 4. Dysphagia Diagnosis: Oropharyngeal dysphagia with risk prior to swallow and risk post swallow. Oropharyngeal dysphagia impairments and s/s appear consistent with previous FEES impressions. Oropharyngeal Dysphagia characterized by left oral motor muscular weakness/sensory deficits impacting mastication, bolus control and formation/cohesion and A-P oral transit; delayed initiation of swallow; and concern for reduced laryngeal rom/tongue base retraction and pharyngeal clearance. Pt needs set up and supervision. Pt needs oral care post meal and med pass due to left anterior loss and oral pocketing left and reduced sensation. Aspiration s/s with thin liquids. Feed only when alert. Visual deficits impact ease of self feeding or use of table top and food tray. Pt insight also exacerbates. Recommendations: Continue diet as ordered IDDSI soft/bite size food consistencies with mildly thick liquids. SLP therapy targeting om ex; bolus control/formation ex; laryngeal /pharyngeal ex and sensory stimulation. Therapy will include therapy po trials. Anticipate MBSS prior to d/c from ARU and potentially prior to diet upgrade. Date of Admit: 2023  Room #: M4M-2686/3270-01  Date: 2023       Current functional status (updated daily):         Current Diet Order:ADULT DIET;  Dysphagia - Soft and Bite Sized; Mildly Thick ([place])  ADULT ORAL NUTRITION SUPPLEMENT; Lunch, Dinner; Frozen Oral Supplement   Behavior: [] Alert  [] Cooperative  []  Pleasant  [x] Confused  [] Agitated  [] Uncooperative  [] Distractible [] Motivated  [] Self-Limiting [] Anxious  [x] Other: consistencies with mildly thick liquid consistent diet without observed clinical signs of aspiration, Not targeted     Soft/bite size: goal met with external cues for use of strategy   The patient will improve oral preparation phase via bolus control/manipulation exercises to 5/5 each trial.,  Targeted with thin liquids:   Lingual coordination appears improved for both cup presentations and straw presentations. No anterior loss observed presentation or post swallow of any residue    Pt is achieving left om muscular engagement for sustained labial closure /labial seal         Targeted with thin liquids:   Lingual coordination appears improved for straw presentations. No anterior loss observed presentation or post swallow of any residue      Soft/bite size food consistencies:  goal met with use of compensatory strategies    The patient/caregiver will demonstrate understanding of compensatory strategies for improved swallowing safety. Max cues for rationale for:   Upright positioning  Small sips/bites  Rate of intake   Max reinforcement  for rationale for:   Upright positioning  Small sips/bites  Rate of intake  Lingual sweep/oral suckling for clearance of any oral residue prior to new bite/sip        Pt will demonstrate improved swallow response via laryngeal/pharyngeal ex; sensory stimulatino for therapy trials of easy to chew food and/or thin liquids; AND demonstrate improved ease of bolus control for reduced severity /frequence of oral pocketing Ongoing discussion of NMES Vital Stimulation-unable to complete; pt still with facial / neck hair    Thin liquids:   No overt clnical s/s of aspiration or penetration with isolated sips. No post swallow pt compolaints    Plan will attempt with alternating food presentations and if well hope for upgrade Targeted with po trials of thin liquids alternated with foods:   No overt clnical s/s of aspiration or penetration wti isolated sips.  No post swallow pt complaints  Will

## 2023-09-29 LAB
ALBUMIN SERPL-MCNC: 3.4 G/DL (ref 3.4–5)
ALP SERPL-CCNC: 77 U/L (ref 40–129)
ALT SERPL-CCNC: 38 U/L (ref 10–40)
ANION GAP SERPL CALCULATED.3IONS-SCNC: 11 MMOL/L (ref 3–16)
AST SERPL-CCNC: 34 U/L (ref 15–37)
BACTERIA BLD CULT: NORMAL
BASOPHILS # BLD: 0.1 K/UL (ref 0–0.2)
BASOPHILS NFR BLD: 1 %
BILIRUB DIRECT SERPL-MCNC: <0.2 MG/DL (ref 0–0.3)
BILIRUB INDIRECT SERPL-MCNC: NORMAL MG/DL (ref 0–1)
BILIRUB SERPL-MCNC: 0.4 MG/DL (ref 0–1)
BUN SERPL-MCNC: 20 MG/DL (ref 7–20)
CALCIUM SERPL-MCNC: 8.9 MG/DL (ref 8.3–10.6)
CHLORIDE SERPL-SCNC: 105 MMOL/L (ref 99–110)
CO2 SERPL-SCNC: 20 MMOL/L (ref 21–32)
CREAT SERPL-MCNC: 1.2 MG/DL (ref 0.9–1.3)
DEPRECATED RDW RBC AUTO: 12.3 % (ref 12.4–15.4)
EOSINOPHIL # BLD: 0.1 K/UL (ref 0–0.6)
EOSINOPHIL NFR BLD: 1 %
GFR SERPLBLD CREATININE-BSD FMLA CKD-EPI: >60 ML/MIN/{1.73_M2}
GLUCOSE SERPL-MCNC: 113 MG/DL (ref 70–99)
HCT VFR BLD AUTO: 39.7 % (ref 40.5–52.5)
HGB BLD-MCNC: 13.6 G/DL (ref 13.5–17.5)
LACTATE BLDV-SCNC: 1 MMOL/L (ref 0.4–2)
LYMPHOCYTES # BLD: 2.4 K/UL (ref 1–5.1)
LYMPHOCYTES NFR BLD: 19 %
MCH RBC QN AUTO: 32.4 PG (ref 26–34)
MCHC RBC AUTO-ENTMCNC: 34.2 G/DL (ref 31–36)
MCV RBC AUTO: 94.9 FL (ref 80–100)
MONOCYTES # BLD: 0.6 K/UL (ref 0–1.3)
MONOCYTES NFR BLD: 4.9 %
NEUTROPHILS # BLD: 9.2 K/UL (ref 1.7–7.7)
NEUTROPHILS NFR BLD: 74.1 %
PLATELET # BLD AUTO: 277 K/UL (ref 135–450)
PMV BLD AUTO: 8 FL (ref 5–10.5)
POTASSIUM SERPL-SCNC: 4.4 MMOL/L (ref 3.5–5.1)
PROCALCITONIN SERPL IA-MCNC: 0.05 NG/ML (ref 0–0.15)
PROT SERPL-MCNC: 7.1 G/DL (ref 6.4–8.2)
RBC # BLD AUTO: 4.19 M/UL (ref 4.2–5.9)
SODIUM SERPL-SCNC: 136 MMOL/L (ref 136–145)
WBC # BLD AUTO: 12.4 K/UL (ref 4–11)

## 2023-09-29 PROCEDURE — 97130 THER IVNTJ EA ADDL 15 MIN: CPT

## 2023-09-29 PROCEDURE — 83605 ASSAY OF LACTIC ACID: CPT

## 2023-09-29 PROCEDURE — 97129 THER IVNTJ 1ST 15 MIN: CPT

## 2023-09-29 PROCEDURE — 97530 THERAPEUTIC ACTIVITIES: CPT

## 2023-09-29 PROCEDURE — 97535 SELF CARE MNGMENT TRAINING: CPT

## 2023-09-29 PROCEDURE — 84145 PROCALCITONIN (PCT): CPT

## 2023-09-29 PROCEDURE — 2580000003 HC RX 258: Performed by: PHYSICAL MEDICINE & REHABILITATION

## 2023-09-29 PROCEDURE — 92526 ORAL FUNCTION THERAPY: CPT

## 2023-09-29 PROCEDURE — 1280000000 HC REHAB R&B

## 2023-09-29 PROCEDURE — 94760 N-INVAS EAR/PLS OXIMETRY 1: CPT

## 2023-09-29 PROCEDURE — 97542 WHEELCHAIR MNGMENT TRAINING: CPT

## 2023-09-29 PROCEDURE — 6370000000 HC RX 637 (ALT 250 FOR IP): Performed by: PHYSICAL MEDICINE & REHABILITATION

## 2023-09-29 PROCEDURE — 80076 HEPATIC FUNCTION PANEL: CPT

## 2023-09-29 PROCEDURE — 97112 NEUROMUSCULAR REEDUCATION: CPT

## 2023-09-29 PROCEDURE — 36415 COLL VENOUS BLD VENIPUNCTURE: CPT

## 2023-09-29 PROCEDURE — 85025 COMPLETE CBC W/AUTO DIFF WBC: CPT

## 2023-09-29 PROCEDURE — 80048 BASIC METABOLIC PNL TOTAL CA: CPT

## 2023-09-29 RX ORDER — CARVEDILOL 12.5 MG/1
12.5 TABLET ORAL 2 TIMES DAILY WITH MEALS
Status: DISCONTINUED | OUTPATIENT
Start: 2023-09-29 | End: 2023-10-12 | Stop reason: HOSPADM

## 2023-09-29 RX ADMIN — TRAMADOL HYDROCHLORIDE 50 MG: 50 TABLET ORAL at 17:07

## 2023-09-29 RX ADMIN — SERTRALINE HYDROCHLORIDE 25 MG: 25 TABLET ORAL at 08:25

## 2023-09-29 RX ADMIN — AMLODIPINE BESYLATE 10 MG: 10 TABLET ORAL at 08:25

## 2023-09-29 RX ADMIN — SENNOSIDES AND DOCUSATE SODIUM 1 TABLET: 50; 8.6 TABLET ORAL at 21:12

## 2023-09-29 RX ADMIN — Medication 10 ML: at 21:04

## 2023-09-29 RX ADMIN — ATORVASTATIN CALCIUM 40 MG: 40 TABLET, FILM COATED ORAL at 09:08

## 2023-09-29 RX ADMIN — CARVEDILOL 12.5 MG: 12.5 TABLET, FILM COATED ORAL at 17:12

## 2023-09-29 RX ADMIN — NYSTATIN 500000 UNITS: 100000 SUSPENSION ORAL at 21:11

## 2023-09-29 RX ADMIN — CLONIDINE HYDROCHLORIDE 0.3 MG: 0.1 TABLET ORAL at 08:25

## 2023-09-29 RX ADMIN — TRAMADOL HYDROCHLORIDE 50 MG: 50 TABLET ORAL at 04:33

## 2023-09-29 RX ADMIN — SENNOSIDES AND DOCUSATE SODIUM 1 TABLET: 50; 8.6 TABLET ORAL at 09:08

## 2023-09-29 RX ADMIN — CLONIDINE HYDROCHLORIDE 0.3 MG: 0.1 TABLET ORAL at 16:00

## 2023-09-29 RX ADMIN — HYDRALAZINE HYDROCHLORIDE 100 MG: 50 TABLET, FILM COATED ORAL at 21:12

## 2023-09-29 RX ADMIN — HYDRALAZINE HYDROCHLORIDE 100 MG: 50 TABLET, FILM COATED ORAL at 15:59

## 2023-09-29 RX ADMIN — FAMOTIDINE 20 MG: 20 TABLET ORAL at 21:11

## 2023-09-29 RX ADMIN — BUTALBITAL, ACETAMINOPHEN AND CAFFEINE 1 TABLET: 325; 50; 40 TABLET ORAL at 21:11

## 2023-09-29 RX ADMIN — FAMOTIDINE 20 MG: 20 TABLET ORAL at 08:25

## 2023-09-29 RX ADMIN — CLONIDINE HYDROCHLORIDE 0.3 MG: 0.1 TABLET ORAL at 21:12

## 2023-09-29 RX ADMIN — VALSARTAN 160 MG: 160 TABLET ORAL at 08:25

## 2023-09-29 RX ADMIN — HYDRALAZINE HYDROCHLORIDE 100 MG: 50 TABLET, FILM COATED ORAL at 05:29

## 2023-09-29 ASSESSMENT — PAIN SCALES - GENERAL
PAINLEVEL_OUTOF10: 8
PAINLEVEL_OUTOF10: 7
PAINLEVEL_OUTOF10: 8
PAINLEVEL_OUTOF10: 5
PAINLEVEL_OUTOF10: 8
PAINLEVEL_OUTOF10: 6
PAINLEVEL_OUTOF10: 0

## 2023-09-29 ASSESSMENT — PAIN DESCRIPTION - DESCRIPTORS
DESCRIPTORS: ACHING
DESCRIPTORS: ACHING;DISCOMFORT
DESCRIPTORS: ACHING

## 2023-09-29 ASSESSMENT — PAIN DESCRIPTION - LOCATION
LOCATION: HEAD;NECK

## 2023-09-29 ASSESSMENT — PAIN DESCRIPTION - ORIENTATION
ORIENTATION: POSTERIOR

## 2023-09-29 ASSESSMENT — PAIN - FUNCTIONAL ASSESSMENT: PAIN_FUNCTIONAL_ASSESSMENT: ACTIVITIES ARE NOT PREVENTED

## 2023-09-29 ASSESSMENT — PAIN DESCRIPTION - FREQUENCY: FREQUENCY: CONTINUOUS

## 2023-09-29 ASSESSMENT — PAIN DESCRIPTION - ONSET: ONSET: ON-GOING

## 2023-09-29 ASSESSMENT — PAIN DESCRIPTION - PAIN TYPE
TYPE: ACUTE PAIN
TYPE: ACUTE PAIN

## 2023-09-29 NOTE — PROGRESS NOTES
ACUTE REHAB UNIT  SPEECH/LANGUAGE PATHOLOGY      [x] Daily  [] Weekly Care Conference Note  [] Discharge    Patient:Jono Manjarrez      WXP:[birth year]  DJF:[id number]  Rehab Dx/Hx: Nontraumatic intracranial hemorrhage ([address]) [I62.9]    Precautions: Fall risk; left amando; visual deficits; cognitive deficits; dysphagia with aspiration risk  Home situation: Lives with family  ST Dx: [] Aphasia  [x] Dysarthria  [] Apraxia   [x] Oropharyngeal dysphagia [x] Cognitive Impairment  [] Other:   Initial Speech Therapy Assessment Diagnosis:   1. Cognitive Diagnosis: pt demonstrating mild to marked cognitive -linguistic deficits in domains of sustained/focused attention; left visual attention; insight/PS/VPS for safety ; working memory and STM; reasoning and math language. Pt with visual deficits characterized by poor attention to the left; left body neglect. Pt lethargy noted but pt was able to both physically / verbally actively participate in varied tasks. Flat affect and overall poor sustained visual attention even when right of midline. Problems wtih distractibility/task extinction; poor body awareness; safety awareness. Will initiate therapy and target attention; working memory and safety insight; and visual language function (using visual sscanning strategies for the latter)  2. Speech Diagnosis: Dysarthria features most symptomatic as pt fatigues with increase in phonemic distortions 2/2 to left om weakness which is exacerbated when fatigued. Pt is able to achieve audible/intelligible connected speech  3. Communication Diagnosis: Auditory processing is most optimal with concrete , short questions/statements and simple one step commands. Pt with attention/working memorya nd concern for complex auditory processing deficits. Visual language processing is markedly reduced due to visual inattention deficits and reduced pt insight.  Verbal expression is functional for conveying needs/wants; concrete confrontation naming and bolus prep; pt with preferential chewing right side; external cues for clearance swallow to clear left oral pocketing  Oral care at end of po (75% of meal tray; 100% liquids on meal tray):  only minimal material removed via use of toothette   The patient/caregiver will demonstrate understanding of compensatory strategies for improved swallowing safety. Max cues for rationale for:   Upright positioning  Small sips/bites  Rate of intake  Lingual sweep   Lingual sweep/oral suckling for clearance of any oral residue prior to new bite/sip   Max reinforcement  for rationale for:   Upright positioning  Small sips/bites  Rate of intake  Lingual sweep/oral suckling for clearance of any oral residue prior to new bite/sip        Pt will demonstrate improved swallow response via laryngeal/pharyngeal ex; sensory stimulatino for therapy trials of easy to chew food and/or thin liquids; AND demonstrate improved ease of bolus control for reduced severity /frequence of oral pocketing Ongoing discussion of NMES Vital Stimulation-unable to complete; pt reports brother did shave head and part of bear but pt still with facial hair around cheek area near mouth and upper neck hair    Thin liquids:   No overt clnical s/s of aspiration or penetration with isolated sips. No post swallow pt compolaints    Soft food/bite size : goal met with compensatory strategies Targeted with po trials of thin liquids alternated with soft foods:   No overt clnical s/s of aspiration or penetration wti isolated sips. No post swallow pt complaints    Continued improvement     OM speech and Cognitive-Linguistic Evaluation Goals: Pt goal it to go home and get some sleep.  Pt was agreeable to treatment poc to address strategies for visual attention for functional reading/writing; attention/memory and VPS tasks     Therapy working toward pt goal Therapy working toward pt goal   Goal 1: Pt will demonstrate participation in ongoing assessment of visual spatial

## 2023-09-29 NOTE — PROGRESS NOTES
side with two person assist, max x 1, mod x 1. Therapist wheeled patient outside to terrace to facilitate wheelchair mobility on uneven surface. Patient able to propel wheelchair with RUE and RLE 30' in total. Able to demonstrate improved ability to coordinate RUE and RLE with minimal path deviation. Patient expressed he had to use restroom. Wheeled patient back inside to private room and patient able to use urinal with assistance from therapist to hold urinal in place. Patient then able to propel wheelchair 37' with frequent rest breaks and significant verbal encouragement from therapists. Patient required verbal cues at this time for attention to task as patient was in a more distracted environment. Performed sit to stand from wheelchair in parallel bars x 2 trials. For first trial patient required max x 2 assistance to stand, L Knee block, posterior hip pressure. Patient stood for 10 seconds. With second trial patient required 3 person assist max x 2, and min x 1 with external towel to address excessive trunk rotation to left and left lateral lean secondary to R pushing. Patient with good head control throughout standing activity. Stood for 40 seconds. Required max x 2 to sit. A: Patient tolerated session well with improved tolerance to activity. Demonstrated more participation in functional activities but continues to require max cues for sequencing and attention to task. Notable pushing of RUE and decreased awareness of midline with sitting balance activities. Will continue to address sitting and standing balance impairments and wheelchair mobility in later sessions.        Safety Device - Type of devices:  []  All fall risk precautions in place [] Bed alarm in place  [] Call light within reach [] Chair alarm in place [] Positioning belt [x] Gait belt [] Patient at risk for falls [] Left in bed [x] Left in chair to go back with transport [] Telesitter in use [] Sitter present [] Nurse notified [] None    Second Session Therapy Time     Individual Co-treatment   Time In  1400   Time Out  1445   Minutes  39     Suzie Sams, 09/29/23 at 11:29 AM          Therapist was present, directed the patient's care, made skilled judgement, and was responsible for assessment and treatment of the patient.         Veronique Rowley, [id number]

## 2023-09-29 NOTE — CARE COORDINATION
Spoke with patient about FMLA paperwork. He reports he will need FMLA. Spoke with sister who states she does not think he is still working. Will work on this.   [place]     Case Management   [phone]    9/29/2023  4:29 PM

## 2023-09-29 NOTE — PROGRESS NOTES
Occupational Therapy  Facility/Department: [facility]  Rehabilitation Occupational Therapy Daily Treatment Note    Date: 23  Patient Name: Arnoldo Dumont       Room: [place] [address]  MRN: [id number]  Account: [de-identified]   : 1970  (50 y.o.) Gender: male                    Past Medical History:  has a past medical history of CKD (chronic kidney disease), Headache, HLD (hyperlipidemia), Hypertension, Left-sided weakness, NSTEMI (non-ST elevated myocardial infarction) ([address]), and Pancreatitis. Past Surgical History:   has a past surgical history that includes Appendectomy. Restrictions  Restrictions/Precautions: Fall Risk, Aspiration Risk  Other position/activity restrictions: soft & bite sized diet w/ nectar thick liquids, total feed, signficant L neglect and L amando body is flaccid  Equipment Used: Bed, Wheelchair    Subjective  Subjective: met in room, supine in bed, drifted far L, RN present to give meds & take vitals; he has not eaten breakfast  Restrictions/Precautions: Fall Risk;Aspiration Risk             Objective     Cognition  Following Commands: Follows one step commands with increased time; Follows one step commands with repetition  Attention Span: Attends with cues to redirect  Memory: Decreased short term memory  Safety Judgement: Decreased awareness of need for assistance;Decreased awareness of need for safety  Problem Solving: Assistance required to generate solutions;Assistance required to identify errors made  Insights: Not aware of deficits  Initiation: Requires cues for some  Sequencing: Requires cues for some  Cognition Comment: more alert but significant L neglect and poor proprioception noted, delayed processing  Orientation  Orientation Level: Oriented X4         ADL  Feeding  Assistance Level: Maximum assistance;Dependent  Skilled Clinical Factors: does not initiate feeding himself, head turned to R; able to bring cup to his mouth when drink+straw is placed in R hand, difficulty swallowing meds despite using pudding or applesauce & drinking nectar thick juice from straw. He needed max A of 2 for supine to sit & mod A to sit on EOB once feet on floor. He required max A of 2-3 using berny plus from bed into bathrm & to sit on BSC in shower. Pt required max A for bathing tasks, decreased initiation & thoroughness, required step by step instruction, PT/S on L side to assist w/ position & upright posture during tasks. Pt needed total A of 2-3 to don shirt, brief & pants, used berny plus for standing w/ A of 2-3, flexed posture, UB collapse & assist needed to support  L side. Pt placed in high back w/c w/ L 1/2 lap tray; taken to SLP and his breakfast was heated up for that session. Unable to complete oral care since pt had not eaten breakfast.. Recommend continued OT & PT service son rehab x 2-3 more wks to decreased caregiver burden w/ ADLs  Activity Tolerance: Patient limited by fatigue;Treatment limited secondary to decreased cognition;Treatment limited secondary to medical complications  Discharge Recommendations: Continue to assess pending progress; Patient would benefit from continued therapy after discharge;24 hour supervision or assist  Factors Affecting Discharge: L side flaccid, neglect, poor balance/proprioception/motor planning  OT Equipment Recommendations  Other: TBD depending on DC disposition  Safety Devices  Safety Devices in place: Yes  Type of devices: Gait belt    Patient Education  Education  Education Given To: Patient  Education Provided: ADL Function;Equipment  Education Provided Comments: attempted to have pt use LH sponge to wash shins<>feet but needed hand over hand guidance & cues to attend to his L side  Education Method: Demonstration;Verbal  Barriers to Learning: Cognition;Vision  Education Outcome: Continued education needed; Unable to demonstrate understanding  Skilled Clinical Factors: poor motor planning, proprioception,  L neglect, decreased

## 2023-09-29 NOTE — PLAN OF CARE
Problem: Discharge Planning  Goal: Discharge to home or other facility with appropriate resources  Outcome: Progressing     Problem: Pain  Goal: Verbalizes/displays adequate comfort level or baseline comfort level  Outcome: Progressing     Problem: Skin/Tissue Integrity  Goal: Absence of new skin breakdown  Description: 1. Monitor for areas of redness and/or skin breakdown  2. Assess vascular access sites hourly  3. Every 4-6 hours minimum:  Change oxygen saturation probe site  4. Every 4-6 hours:  If on nasal continuous positive airway pressure, respiratory therapy assess nares and determine need for appliance change or resting period.   Outcome: Progressing     Problem: ABCDS Injury Assessment  Goal: Absence of physical injury  Outcome: Progressing     Problem: Neurosensory - Adult  Goal: Achieves stable or improved neurological status  Outcome: Progressing  Goal: Achieves maximal functionality and self care  Outcome: Progressing  Goal: Absence of seizures  Outcome: Progressing  Goal: Remains free of injury related to seizures activity  Outcome: Progressing     Problem: Skin/Tissue Integrity - Adult  Goal: Skin integrity remains intact  Outcome: Progressing  Goal: Oral mucous membranes remain intact  Outcome: Progressing     Problem: Musculoskeletal - Adult  Goal: Return mobility to safest level of function  Outcome: Progressing  Goal: Maintain proper alignment of affected body part  Outcome: Progressing  Goal: Return ADL status to a safe level of function  Outcome: Progressing     Problem: Infection - Adult  Goal: Absence of infection at discharge  Outcome: Progressing  Goal: Absence of infection during hospitalization  Outcome: Progressing  Goal: Absence of fever/infection during anticipated neutropenic period  Outcome: Progressing     Problem: Respiratory - Adult  Goal: Achieves optimal ventilation and oxygenation  Outcome: Progressing     Problem: Cardiovascular - Adult  Goal: Maintains optimal cardiac

## 2023-09-29 NOTE — PROGRESS NOTES
Patient admitted to rehab with Nontraumatic Intracranial Hemorrhage. A/Ox4. Transfers with Sonido Motley Plus/Maxi Move x4 staff assist. Mobility restrictions: Flaccid L side. On Dysphasia soft and bite size, thins diet, tolerating as best he can, family brings in food. Medications taken whole with thick pudding. On no DVT prophylaxis due to risk for bleeding. Skin: intact. Oxygen: RA. LDA: PIV Right wrist. Has been continent of bowel and incontinent of bladder. LBM 9/28/2023. Chair/bed alarms in use and call light in reach. Will monitor for safety.  Electronically signed by Nimisha Pretty RN on 9/29/2023 at 7:33 PM

## 2023-09-29 NOTE — PROGRESS NOTES
Patient admitted to rehab with nontraumatic intracranial hemorrhage. A/Ox4. Transfers with Regine Aneta plus or Maxi move. Mobility restrictions: flaccid left side. On soft, bite sized diet, nectar thick liquids. Medications taken whole, one at a time in pudding. Skin: intact. Oxygen: RA. LDA: rt wrist. Has been incontinent of bladder this shift. LBM 9/28/23. Pt received prn pain meds x1 this shift (see MAR). Pt resting quietly in bed at this time Bed alarm on and call light in reach.

## 2023-09-29 NOTE — PLAN OF CARE
Problem: Discharge Planning  Goal: Discharge to home or other facility with appropriate resources  9/28/2023 2252 by Kiersten Montenegro RN  Outcome: Progressing  Flowsheets (Taken 9/28/2023 2252)  Discharge to home or other facility with appropriate resources:   Identify barriers to discharge with patient and caregiver   Identify discharge learning needs (meds, wound care, etc)     Problem: Pain  Goal: Verbalizes/displays adequate comfort level or baseline comfort level  9/28/2023 2252 by Kiersten Montenegro RN  Outcome: Progressing  Flowsheets (Taken 9/28/2023 2252)  Verbalizes/displays adequate comfort level or baseline comfort level:   Encourage patient to monitor pain and request assistance   Assess pain using appropriate pain scale   Administer analgesics based on type and severity of pain and evaluate response   Implement non-pharmacological measures as appropriate and evaluate response     Problem: Skin/Tissue Integrity  Goal: Absence of new skin breakdown  Description: 1. Monitor for areas of redness and/or skin breakdown  2. Assess vascular access sites hourly  3. Every 4-6 hours minimum:  Change oxygen saturation probe site  4. Every 4-6 hours:  If on nasal continuous positive airway pressure, respiratory therapy assess nares and determine need for appliance change or resting period. 9/28/2023 2252 by Kiersten Montenegro RN  Outcome: Progressing  Note: Skin assessment completed on admission and every shift. Barrier wipes used in the event of incontinence. Pressure relief techniques used as needed while in chair and in bed. Position changes encouraged at least every two hours while in bed.         Problem: ABCDS Injury Assessment  Goal: Absence of physical injury  9/28/2023 2252 by Kiersten Montenegro RN  Outcome: Progressing     Problem: Neurosensory - Adult  Goal: Achieves stable or improved neurological status  9/28/2023 2252 by Kiersten Montenegro RN  Outcome: Progressing  Flowsheets (Taken 9/28/2023 2252)  Achieves stable or improved neurological status: Assess for and report changes in neurological status     Problem: Neurosensory - Adult  Goal: Achieves maximal functionality and self care  9/28/2023 2252 by Leonora Maguire RN  Outcome: Progressing  Flowsheets (Taken 9/28/2023 2252)  Achieves maximal functionality and self care:   Monitor swallowing and airway patency with patient fatigue and changes in neurological status   Encourage and assist patient to increase activity and self care with guidance from physical therapy/occupational therapy     Problem: Skin/Tissue Integrity - Adult  Goal: Skin integrity remains intact  9/28/2023 2252 by Leonora Maguire RN  Outcome: Progressing  Note: Skin assessment completed on admission and every shift. Barrier wipes used in the event of incontinence. Pressure relief techniques used as needed while in chair and in bed. Position changes encouraged at least every two hours while in bed.

## 2023-09-30 PROBLEM — R44.3 HALLUCINATIONS: Status: ACTIVE | Noted: 2023-09-30

## 2023-09-30 PROBLEM — F41.9 ANXIETY DISORDER: Status: ACTIVE | Noted: 2023-09-30

## 2023-09-30 LAB
ANION GAP SERPL CALCULATED.3IONS-SCNC: 11 MMOL/L (ref 3–16)
BASOPHILS # BLD: 0.1 K/UL (ref 0–0.2)
BASOPHILS NFR BLD: 0.7 %
BILIRUB UR QL STRIP.AUTO: NEGATIVE
BUN SERPL-MCNC: 23 MG/DL (ref 7–20)
CALCIUM SERPL-MCNC: 8.8 MG/DL (ref 8.3–10.6)
CHLORIDE SERPL-SCNC: 103 MMOL/L (ref 99–110)
CLARITY UR: CLEAR
CO2 SERPL-SCNC: 22 MMOL/L (ref 21–32)
COLOR UR: YELLOW
CREAT SERPL-MCNC: 1.5 MG/DL (ref 0.9–1.3)
DEPRECATED RDW RBC AUTO: 12.6 % (ref 12.4–15.4)
EOSINOPHIL # BLD: 0.1 K/UL (ref 0–0.6)
EOSINOPHIL NFR BLD: 1.3 %
GFR SERPLBLD CREATININE-BSD FMLA CKD-EPI: 56 ML/MIN/{1.73_M2}
GLUCOSE SERPL-MCNC: 133 MG/DL (ref 70–99)
GLUCOSE UR STRIP.AUTO-MCNC: NEGATIVE MG/DL
HCT VFR BLD AUTO: 38.2 % (ref 40.5–52.5)
HGB BLD-MCNC: 13 G/DL (ref 13.5–17.5)
HGB UR QL STRIP.AUTO: NEGATIVE
KETONES UR STRIP.AUTO-MCNC: NEGATIVE MG/DL
LEUKOCYTE ESTERASE UR QL STRIP.AUTO: NEGATIVE
LYMPHOCYTES # BLD: 3 K/UL (ref 1–5.1)
LYMPHOCYTES NFR BLD: 31.1 %
MCH RBC QN AUTO: 32.7 PG (ref 26–34)
MCHC RBC AUTO-ENTMCNC: 34.2 G/DL (ref 31–36)
MCV RBC AUTO: 95.8 FL (ref 80–100)
MONOCYTES # BLD: 0.7 K/UL (ref 0–1.3)
MONOCYTES NFR BLD: 7.1 %
NEUTROPHILS # BLD: 5.8 K/UL (ref 1.7–7.7)
NEUTROPHILS NFR BLD: 59.8 %
NITRITE UR QL STRIP.AUTO: NEGATIVE
PH UR STRIP.AUTO: 5.5 [PH] (ref 5–8)
PLATELET # BLD AUTO: 271 K/UL (ref 135–450)
PMV BLD AUTO: 7.7 FL (ref 5–10.5)
POTASSIUM SERPL-SCNC: 4.2 MMOL/L (ref 3.5–5.1)
PROT UR STRIP.AUTO-MCNC: NEGATIVE MG/DL
RBC # BLD AUTO: 3.98 M/UL (ref 4.2–5.9)
SODIUM SERPL-SCNC: 136 MMOL/L (ref 136–145)
SP GR UR STRIP.AUTO: 1.02 (ref 1–1.03)
UA COMPLETE W REFLEX CULTURE PNL UR: NORMAL
UA DIPSTICK W REFLEX MICRO PNL UR: NORMAL
URN SPEC COLLECT METH UR: NORMAL
UROBILINOGEN UR STRIP-ACNC: 0.2 E.U./DL
WBC # BLD AUTO: 9.8 K/UL (ref 4–11)

## 2023-09-30 PROCEDURE — 83036 HEMOGLOBIN GLYCOSYLATED A1C: CPT

## 2023-09-30 PROCEDURE — 81003 URINALYSIS AUTO W/O SCOPE: CPT

## 2023-09-30 PROCEDURE — 80048 BASIC METABOLIC PNL TOTAL CA: CPT

## 2023-09-30 PROCEDURE — 99222 1ST HOSP IP/OBS MODERATE 55: CPT | Performed by: REGISTERED NURSE

## 2023-09-30 PROCEDURE — 6370000000 HC RX 637 (ALT 250 FOR IP): Performed by: PHYSICAL MEDICINE & REHABILITATION

## 2023-09-30 PROCEDURE — 85025 COMPLETE CBC W/AUTO DIFF WBC: CPT

## 2023-09-30 PROCEDURE — 36415 COLL VENOUS BLD VENIPUNCTURE: CPT

## 2023-09-30 PROCEDURE — 94760 N-INVAS EAR/PLS OXIMETRY 1: CPT

## 2023-09-30 PROCEDURE — 1280000000 HC REHAB R&B

## 2023-09-30 RX ORDER — ALPRAZOLAM 0.25 MG/1
0.25 TABLET ORAL 3 TIMES DAILY PRN
Status: DISCONTINUED | OUTPATIENT
Start: 2023-09-30 | End: 2023-10-12 | Stop reason: HOSPADM

## 2023-09-30 RX ADMIN — HYDRALAZINE HYDROCHLORIDE 100 MG: 50 TABLET, FILM COATED ORAL at 06:07

## 2023-09-30 RX ADMIN — FAMOTIDINE 20 MG: 20 TABLET ORAL at 21:58

## 2023-09-30 RX ADMIN — NYSTATIN 500000 UNITS: 100000 SUSPENSION ORAL at 16:35

## 2023-09-30 RX ADMIN — TRAMADOL HYDROCHLORIDE 50 MG: 50 TABLET ORAL at 21:59

## 2023-09-30 RX ADMIN — CLONIDINE HYDROCHLORIDE 0.3 MG: 0.1 TABLET ORAL at 08:05

## 2023-09-30 RX ADMIN — SERTRALINE HYDROCHLORIDE 25 MG: 25 TABLET ORAL at 08:07

## 2023-09-30 RX ADMIN — SENNOSIDES AND DOCUSATE SODIUM 1 TABLET: 50; 8.6 TABLET ORAL at 21:59

## 2023-09-30 RX ADMIN — NYSTATIN 500000 UNITS: 100000 SUSPENSION ORAL at 11:53

## 2023-09-30 RX ADMIN — Medication 3 MG: at 21:58

## 2023-09-30 RX ADMIN — FAMOTIDINE 20 MG: 20 TABLET ORAL at 08:07

## 2023-09-30 RX ADMIN — CLONIDINE HYDROCHLORIDE 0.3 MG: 0.1 TABLET ORAL at 13:36

## 2023-09-30 RX ADMIN — CARVEDILOL 12.5 MG: 12.5 TABLET, FILM COATED ORAL at 16:35

## 2023-09-30 RX ADMIN — AMLODIPINE BESYLATE 10 MG: 10 TABLET ORAL at 08:04

## 2023-09-30 RX ADMIN — NYSTATIN 500000 UNITS: 100000 SUSPENSION ORAL at 22:04

## 2023-09-30 RX ADMIN — SENNOSIDES AND DOCUSATE SODIUM 1 TABLET: 50; 8.6 TABLET ORAL at 08:06

## 2023-09-30 RX ADMIN — CLONIDINE HYDROCHLORIDE 0.3 MG: 0.1 TABLET ORAL at 21:58

## 2023-09-30 RX ADMIN — TIZANIDINE 2 MG: 4 TABLET ORAL at 11:53

## 2023-09-30 RX ADMIN — HYDRALAZINE HYDROCHLORIDE 100 MG: 50 TABLET, FILM COATED ORAL at 21:59

## 2023-09-30 RX ADMIN — ATORVASTATIN CALCIUM 40 MG: 40 TABLET, FILM COATED ORAL at 08:04

## 2023-09-30 RX ADMIN — HYDRALAZINE HYDROCHLORIDE 100 MG: 50 TABLET, FILM COATED ORAL at 13:36

## 2023-09-30 RX ADMIN — VALSARTAN 160 MG: 160 TABLET ORAL at 08:07

## 2023-09-30 RX ADMIN — NYSTATIN 500000 UNITS: 100000 SUSPENSION ORAL at 08:08

## 2023-09-30 RX ADMIN — TRAMADOL HYDROCHLORIDE 50 MG: 50 TABLET ORAL at 08:06

## 2023-09-30 ASSESSMENT — PAIN DESCRIPTION - ONSET: ONSET: PROGRESSIVE

## 2023-09-30 ASSESSMENT — PAIN DESCRIPTION - LOCATION
LOCATION: HEAD;NECK
LOCATION: HEAD;NECK
LOCATION: NECK

## 2023-09-30 ASSESSMENT — PAIN DESCRIPTION - DESCRIPTORS
DESCRIPTORS: THROBBING
DESCRIPTORS: THROBBING
DESCRIPTORS: ACHING;THROBBING

## 2023-09-30 ASSESSMENT — PAIN SCALES - WONG BAKER
WONGBAKER_NUMERICALRESPONSE: 0
WONGBAKER_NUMERICALRESPONSE: 0

## 2023-09-30 ASSESSMENT — PAIN SCALES - GENERAL
PAINLEVEL_OUTOF10: 7
PAINLEVEL_OUTOF10: 6
PAINLEVEL_OUTOF10: 5
PAINLEVEL_OUTOF10: 0
PAINLEVEL_OUTOF10: 9
PAINLEVEL_OUTOF10: 9
PAINLEVEL_OUTOF10: 5
PAINLEVEL_OUTOF10: 8
PAINLEVEL_OUTOF10: 7
PAINLEVEL_OUTOF10: 6

## 2023-09-30 ASSESSMENT — PAIN DESCRIPTION - FREQUENCY: FREQUENCY: INTERMITTENT

## 2023-09-30 ASSESSMENT — PAIN DESCRIPTION - ORIENTATION
ORIENTATION: POSTERIOR

## 2023-09-30 NOTE — PLAN OF CARE
Problem: Discharge Planning  Goal: Discharge to home or other facility with appropriate resources  Outcome: Progressing  Flowsheets (Taken 9/30/2023 0023 by Maris Calvin RN)  Discharge to home or other facility with appropriate resources:   Identify barriers to discharge with patient and caregiver   Identify discharge learning needs (meds, wound care, etc)     Problem: Pain  Goal: Verbalizes/displays adequate comfort level or baseline comfort level  Outcome: Progressing  Flowsheets (Taken 9/30/2023 1436)  Verbalizes/displays adequate comfort level or baseline comfort level:   Encourage patient to monitor pain and request assistance   Assess pain using appropriate pain scale   Administer analgesics based on type and severity of pain and evaluate response   Implement non-pharmacological measures as appropriate and evaluate response     Problem: Skin/Tissue Integrity  Goal: Absence of new skin breakdown  Description: 1. Monitor for areas of redness and/or skin breakdown  2. Assess vascular access sites hourly  3. Every 4-6 hours minimum:  Change oxygen saturation probe site  4. Every 4-6 hours:  If on nasal continuous positive airway pressure, respiratory therapy assess nares and determine need for appliance change or resting period.   Outcome: Progressing     Problem: ABCDS Injury Assessment  Goal: Absence of physical injury  Outcome: Progressing  Flowsheets (Taken 9/30/2023 0023 by Maris Calvin RN)  Absence of Physical Injury: Implement safety measures based on patient assessment     Problem: Neurosensory - Adult  Goal: Achieves stable or improved neurological status  Outcome: Progressing  Flowsheets (Taken 9/30/2023 1436)  Achieves stable or improved neurological status: Assess for and report changes in neurological status  Goal: Achieves maximal functionality and self care  Outcome: Progressing  Flowsheets (Taken 9/28/2023 2022 by Maris Calvin RN)  Achieves maximal functionality and self care: Monitor swallowing and airway patency with patient fatigue and changes in neurological status   Encourage and assist patient to increase activity and self care with guidance from physical therapy/occupational therapy  Goal: Absence of seizures  Outcome: Progressing  Flowsheets (Taken 9/30/2023 1436)  Absence of seizures: Monitor for seizure activity.   If seizure occurs, document type and location of movements and any associated apnea     Problem: Skin/Tissue Integrity - Adult  Goal: Skin integrity remains intact  Outcome: Progressing  Flowsheets (Taken 9/30/2023 1436)  Skin Integrity Remains Intact:   Monitor for areas of redness and/or skin breakdown   Assess vascular access sites hourly  Goal: Oral mucous membranes remain intact  Outcome: Progressing  Flowsheets (Taken 9/30/2023 1436)  Oral Mucous Membranes Remain Intact:   Assess oral mucosa and hygiene practices   Implement preventative oral hygiene regimen     Problem: Musculoskeletal - Adult  Goal: Return mobility to safest level of function  Outcome: Progressing  Flowsheets (Taken 9/30/2023 1436)  Return Mobility to Safest Level of Function:   Assess patient stability and activity tolerance for standing, transferring and ambulating with or without assistive devices   Assist with transfers and ambulation using safe patient handling equipment as needed   Ensure adequate protection for wounds/incisions during mobilization  Goal: Maintain proper alignment of affected body part  Outcome: Progressing  Flowsheets (Taken 9/28/2023 1028 by Billie Sarkar LPN)  Maintain proper alignment of affected body part: Support and protect limb and body alignment per provider's orders  Goal: Return ADL status to a safe level of function  Outcome: Progressing  Flowsheets (Taken 9/30/2023 1436)  Return ADL Status to a Safe Level of Function:   Administer medication as ordered   Assess activities of daily living deficits and provide assistive devices as needed     Problem: Infection -

## 2023-09-30 NOTE — PROGRESS NOTES
Patient admitted to rehab with nontraumatic intracranial hemorrhage. A/Ox4. Transfers with Dene Bolognese plus or Maxi move. Mobility restrictions: flaccid left side. On soft, bite sized diet, thin liquids. Medications taken whole, one at a time in pudding. Skin: intact. Oxygen: RA. Has been incontinent of bladder this shift. LBM 9/29/23. Pt received prn pain meds x1 this shift (see MAR). Pt resting quietly in bed at this time Bed alarm on and call light in reach.

## 2023-09-30 NOTE — PROGRESS NOTES
Hypertension  No date: Left-sided weakness  11/02/2020: NSTEMI (non-ST elevated myocardial infarction) ([address])  No date: Pancreatitis  Past Surgical History:  No date: APPENDECTOMY      Family History:   History reviewed. No pertinent family history. Psychiatric: denies     History of completed suicide: denies     Allergies:  No Known Allergies    Home Medications:   No current facility-administered medications on file prior to encounter.   Current Outpatient Medications on File Prior to Encounter:  amLODIPine (NORVASC) 10 MG tablet, Take 1 tablet by mouth daily, Disp: , Rfl:   atorvastatin (LIPITOR) 40 MG tablet, Take 1 tablet by mouth daily, Disp: , Rfl:   bisacodyl (DULCOLAX) 10 MG suppository, Place 1 suppository rectally daily as needed for Constipation, Disp: , Rfl:   carvedilol (COREG) 25 MG tablet, Take 1 tablet by mouth 2 times daily (with meals), Disp: , Rfl:   cloNIDine (CATAPRES) 0.3 MG tablet, Take 1 tablet by mouth 3 times daily, Disp: , Rfl:   hydrALAZINE (APRESOLINE) 100 MG tablet, Take 1 tablet by mouth 3 times daily, Disp: , Rfl:   melatonin 3 MG TABS tablet, Take 1 tablet by mouth at bedtime, Disp: , Rfl:    tiZANidine (ZANAFLEX) 2 MG tablet, Take 1 tablet by mouth every 6 hours as needed, Disp: , Rfl:   valsartan (DIOVAN) 160 MG tablet, Take 1 tablet by mouth daily, Disp: , Rfl:         Medications:  Scheduled Meds:carvedilol, 12.5 mg, Oral, BID WC  nystatin, 5 mL, Oral, 4x Daily  sodium chloride flush, 5-40 mL, IntraVENous, 2 times per day  sertraline, 25 mg, Oral, Daily  amLODIPine, 10 mg, Oral, Daily  cloNIDine, 0.3 mg, Oral, TID  hydrALAZINE, 100 mg, Oral, 3 times per day  valsartan, 160 mg, Oral, Daily  atorvastatin, 40 mg, Oral, Daily  famotidine, 20 mg, Oral, BID  sennosides-docusate sodium, 1 tablet, Oral, BID      PRN Meds:.sodium chloride flush, sodium chloride, butalbital-acetaminophen-caffeine, traMADol, bisacodyl, ondansetron, melatonin, hydrALAZINE, tiZANidine, acetaminophen, experience hallucinations, then lower dose of antipsychotics such as Risperidone can be be considered. 4. Will order UA, TSH, B12 and folate. Reviewed attending and nurses notes. 5. Will sign off at this time. Dispo: Does NOT require inpatient psych admission. Thank you for this consult, please call the psychiatry consult line for further questions.     Marely Law DNP, PMHNP-BC, CNP  Behavioral Health Service/ psychiatry

## 2023-09-30 NOTE — PLAN OF CARE
Problem: Discharge Planning  Goal: Discharge to home or other facility with appropriate resources  9/30/2023 0023 by Oz Toledo RN  Outcome: Progressing  Flowsheets (Taken 9/30/2023 0023)  Discharge to home or other facility with appropriate resources:   Identify barriers to discharge with patient and caregiver   Identify discharge learning needs (meds, wound care, etc)     Problem: Pain  Goal: Verbalizes/displays adequate comfort level or baseline comfort level  9/30/2023 0023 by Oz Toledo RN  Outcome: Progressing  Flowsheets (Taken 9/30/2023 0023)  Verbalizes/displays adequate comfort level or baseline comfort level:   Encourage patient to monitor pain and request assistance   Assess pain using appropriate pain scale   Administer analgesics based on type and severity of pain and evaluate response   Implement non-pharmacological measures as appropriate and evaluate response     Problem: Skin/Tissue Integrity  Goal: Absence of new skin breakdown  Description: 1. Monitor for areas of redness and/or skin breakdown  2. Assess vascular access sites hourly  3. Every 4-6 hours minimum:  Change oxygen saturation probe site  4. Every 4-6 hours:  If on nasal continuous positive airway pressure, respiratory therapy assess nares and determine need for appliance change or resting period. 9/30/2023 0023 by Oz Toledo RN  Outcome: Progressing  Note: Skin assessment completed on admission and every shift. Barrier wipes used in the event of incontinence. Pressure relief techniques used as needed while in chair and in bed. Position changes encouraged at least every two hours while in bed.         Problem: ABCDS Injury Assessment  Goal: Absence of physical injury  9/30/2023 0023 by Oz Toledo RN  Outcome: Progressing  Flowsheets (Taken 9/30/2023 0023)  Absence of Physical Injury: Implement safety measures based on patient assessment     Problem: Skin/Tissue Integrity - Adult  Goal: Skin integrity remains

## 2023-09-30 NOTE — CONSULTS
HPI:   context: This is a 50 y.o male with PMHx HTN, pancreatitis, NSTEMI, CKD, HLD presented to [facility] on 9/14/19 with Left side weakness, L facial droop, and HA. SBP in 250's up on arrival to ED. He was admitted to [facility] from 9/14/23-9/19/23 for acute basal ganglia/R frontal lobe intraparenchymal hemorrhage with mass effect then transferred [facility] for IP rehab. Per care everywhere chart review patient has chronic hypertensive emergencies for which his visited ED many times. Patient has no hx of psychiatric disorder. Psychiatry was consulted for hallucinations      associated symptoms: Patient reports he had two episodes hallucinations ( auditory and visual.) During my visit two family members at bed side. Patient was very anxious and agitated. He was crying and his HOPA was called by family members. He states, \" I looked on the wall and read writing says- Kill them all. \" \" I want to cut my my left arm because it was numb. \" \" I feel my left side fights fights with and right side. \" Reports, \" I am scared of doing this situation. \"  Denies having AVH during my visit. Denies sleep deprivation. modifying factors:   Timing: acute   duration: 2-3 days   severity: moderate      Collateral information: Talked with patient's niece Marizol Luque [facility]) and dicussed plan of care including considering PRN benzos for anxiety. ROS:  Left side weakness. Denies fever, nausea, urinary retention.      Past Psychiatric History:       Hosp: denies        Diagnoses: denies        Med trials: denies    Outpt: denies       Suicide Attempts: denies      Substance Use History:     Nicotine: former      Alcohol: social drinker      Illicits: denies      Past Medical History:   Past Medical History:  No date: CKD (chronic kidney disease)  No date: Headache  No date: HLD (hyperlipidemia)  No date: Hypertension  No date: Left-sided weakness  11/02/2020: NSTEMI (non-ST elevated myocardial infarction) ([address])  No date:

## 2023-09-30 NOTE — PROGRESS NOTES
Left message with psych hotline @[phone] with details about reason for consult including pt states \"I'm seeing words on the walls saying kill them all. \" Pt also states \"I want to cut my left arm off because I thought someone was touching me last night when I woke up with my left hand on my penis\"

## 2023-09-30 NOTE — PROGRESS NOTES
Patient admitted to rehab with nontraumatic intracranial hemmorrhage. A/Ox4. Transfers with berny plus x2/maxi movex3-4. Mobility restrictions: flaccid L side. On dysphagia soft and bite sized diet, tolerating well. Medications taken whole with thins. On none due to risk for bleeds for DVT prophylaxis. Skin: intact. Oxygen: RA. LDA: none. Has been incontinent of bowel and incontinent of bladder. LBM 9/29. Chair/bed alarms in use and call light in reach. Will monitor for safety.

## 2023-10-01 VITALS
HEIGHT: 72 IN | TEMPERATURE: 97.9 F | BODY MASS INDEX: 25.41 KG/M2 | DIASTOLIC BLOOD PRESSURE: 89 MMHG | WEIGHT: 187.61 LBS | RESPIRATION RATE: 16 BRPM | HEART RATE: 79 BPM | OXYGEN SATURATION: 97 % | SYSTOLIC BLOOD PRESSURE: 135 MMHG

## 2023-10-01 LAB
ANION GAP SERPL CALCULATED.3IONS-SCNC: 12 MMOL/L (ref 3–16)
BASOPHILS # BLD: 0.1 K/UL (ref 0–0.2)
BASOPHILS NFR BLD: 0.7 %
BUN SERPL-MCNC: 20 MG/DL (ref 7–20)
CALCIUM SERPL-MCNC: 9.4 MG/DL (ref 8.3–10.6)
CHLORIDE SERPL-SCNC: 100 MMOL/L (ref 99–110)
CO2 SERPL-SCNC: 23 MMOL/L (ref 21–32)
CREAT SERPL-MCNC: 1.3 MG/DL (ref 0.9–1.3)
DEPRECATED RDW RBC AUTO: 12.5 % (ref 12.4–15.4)
EOSINOPHIL # BLD: 0.1 K/UL (ref 0–0.6)
EOSINOPHIL NFR BLD: 1.1 %
EST. AVERAGE GLUCOSE BLD GHB EST-MCNC: 122.6 MG/DL
FOLATE SERPL-MCNC: 5.54 NG/ML (ref 4.78–24.2)
GFR SERPLBLD CREATININE-BSD FMLA CKD-EPI: >60 ML/MIN/{1.73_M2}
GLUCOSE SERPL-MCNC: 133 MG/DL (ref 70–99)
HBA1C MFR BLD: 5.9 %
HCT VFR BLD AUTO: 39.4 % (ref 40.5–52.5)
HGB BLD-MCNC: 13.8 G/DL (ref 13.5–17.5)
LYMPHOCYTES # BLD: 2.5 K/UL (ref 1–5.1)
LYMPHOCYTES NFR BLD: 23.6 %
MCH RBC QN AUTO: 32.9 PG (ref 26–34)
MCHC RBC AUTO-ENTMCNC: 34.9 G/DL (ref 31–36)
MCV RBC AUTO: 94.2 FL (ref 80–100)
MONOCYTES # BLD: 0.6 K/UL (ref 0–1.3)
MONOCYTES NFR BLD: 5.7 %
NEUTROPHILS # BLD: 7.2 K/UL (ref 1.7–7.7)
NEUTROPHILS NFR BLD: 68.9 %
PLATELET # BLD AUTO: 276 K/UL (ref 135–450)
PMV BLD AUTO: 7.8 FL (ref 5–10.5)
POTASSIUM SERPL-SCNC: 4.4 MMOL/L (ref 3.5–5.1)
RBC # BLD AUTO: 4.18 M/UL (ref 4.2–5.9)
SODIUM SERPL-SCNC: 135 MMOL/L (ref 136–145)
T4 FREE SERPL-MCNC: 1.5 NG/DL (ref 0.9–1.8)
TSH SERPL DL<=0.005 MIU/L-ACNC: 6.84 UIU/ML (ref 0.27–4.2)
VIT B12 SERPL-MCNC: 828 PG/ML (ref 211–911)
WBC # BLD AUTO: 10.5 K/UL (ref 4–11)

## 2023-10-01 PROCEDURE — 82746 ASSAY OF FOLIC ACID SERUM: CPT

## 2023-10-01 PROCEDURE — 82607 VITAMIN B-12: CPT

## 2023-10-01 PROCEDURE — 6370000000 HC RX 637 (ALT 250 FOR IP): Performed by: PHYSICAL MEDICINE & REHABILITATION

## 2023-10-01 PROCEDURE — 84439 ASSAY OF FREE THYROXINE: CPT

## 2023-10-01 PROCEDURE — 85025 COMPLETE CBC W/AUTO DIFF WBC: CPT

## 2023-10-01 PROCEDURE — 36415 COLL VENOUS BLD VENIPUNCTURE: CPT

## 2023-10-01 PROCEDURE — 84443 ASSAY THYROID STIM HORMONE: CPT

## 2023-10-01 PROCEDURE — 1280000000 HC REHAB R&B

## 2023-10-01 PROCEDURE — 80048 BASIC METABOLIC PNL TOTAL CA: CPT

## 2023-10-01 RX ADMIN — TIZANIDINE 2 MG: 4 TABLET ORAL at 06:49

## 2023-10-01 RX ADMIN — TRAMADOL HYDROCHLORIDE 50 MG: 50 TABLET ORAL at 12:29

## 2023-10-01 RX ADMIN — SENNOSIDES AND DOCUSATE SODIUM 1 TABLET: 50; 8.6 TABLET ORAL at 21:09

## 2023-10-01 RX ADMIN — NYSTATIN 500000 UNITS: 100000 SUSPENSION ORAL at 09:37

## 2023-10-01 RX ADMIN — SENNOSIDES AND DOCUSATE SODIUM 1 TABLET: 50; 8.6 TABLET ORAL at 09:37

## 2023-10-01 RX ADMIN — CARVEDILOL 12.5 MG: 12.5 TABLET, FILM COATED ORAL at 16:17

## 2023-10-01 RX ADMIN — HYDRALAZINE HYDROCHLORIDE 100 MG: 50 TABLET, FILM COATED ORAL at 05:35

## 2023-10-01 RX ADMIN — Medication 3 MG: at 21:09

## 2023-10-01 RX ADMIN — HYDRALAZINE HYDROCHLORIDE 100 MG: 50 TABLET, FILM COATED ORAL at 12:58

## 2023-10-01 RX ADMIN — FAMOTIDINE 20 MG: 20 TABLET ORAL at 09:37

## 2023-10-01 RX ADMIN — ATORVASTATIN CALCIUM 40 MG: 40 TABLET, FILM COATED ORAL at 09:37

## 2023-10-01 RX ADMIN — CARVEDILOL 12.5 MG: 12.5 TABLET, FILM COATED ORAL at 09:37

## 2023-10-01 RX ADMIN — HYDRALAZINE HYDROCHLORIDE 100 MG: 50 TABLET, FILM COATED ORAL at 23:08

## 2023-10-01 RX ADMIN — NYSTATIN 500000 UNITS: 100000 SUSPENSION ORAL at 21:09

## 2023-10-01 RX ADMIN — CLONIDINE HYDROCHLORIDE 0.3 MG: 0.1 TABLET ORAL at 21:09

## 2023-10-01 RX ADMIN — TRAMADOL HYDROCHLORIDE 50 MG: 50 TABLET ORAL at 21:23

## 2023-10-01 RX ADMIN — NYSTATIN 500000 UNITS: 100000 SUSPENSION ORAL at 13:14

## 2023-10-01 RX ADMIN — VALSARTAN 160 MG: 160 TABLET ORAL at 09:37

## 2023-10-01 RX ADMIN — FAMOTIDINE 20 MG: 20 TABLET ORAL at 21:09

## 2023-10-01 RX ADMIN — CLONIDINE HYDROCHLORIDE 0.3 MG: 0.1 TABLET ORAL at 09:36

## 2023-10-01 RX ADMIN — AMLODIPINE BESYLATE 10 MG: 10 TABLET ORAL at 09:37

## 2023-10-01 RX ADMIN — NYSTATIN 500000 UNITS: 100000 SUSPENSION ORAL at 16:17

## 2023-10-01 RX ADMIN — SERTRALINE HYDROCHLORIDE 25 MG: 25 TABLET ORAL at 09:37

## 2023-10-01 RX ADMIN — TIZANIDINE 2 MG: 4 TABLET ORAL at 16:17

## 2023-10-01 RX ADMIN — TRAMADOL HYDROCHLORIDE 50 MG: 50 TABLET ORAL at 04:32

## 2023-10-01 RX ADMIN — CLONIDINE HYDROCHLORIDE 0.3 MG: 0.1 TABLET ORAL at 12:59

## 2023-10-01 ASSESSMENT — PAIN SCALES - GENERAL
PAINLEVEL_OUTOF10: 7
PAINLEVEL_OUTOF10: 7
PAINLEVEL_OUTOF10: 5
PAINLEVEL_OUTOF10: 9
PAINLEVEL_OUTOF10: 8
PAINLEVEL_OUTOF10: 7
PAINLEVEL_OUTOF10: 6
PAINLEVEL_OUTOF10: 5
PAINLEVEL_OUTOF10: 5
PAINLEVEL_OUTOF10: 9
PAINLEVEL_OUTOF10: 0
PAINLEVEL_OUTOF10: 5
PAINLEVEL_OUTOF10: 10
PAINLEVEL_OUTOF10: 8

## 2023-10-01 ASSESSMENT — PAIN DESCRIPTION - ORIENTATION
ORIENTATION: POSTERIOR

## 2023-10-01 ASSESSMENT — PAIN DESCRIPTION - DESCRIPTORS
DESCRIPTORS: THROBBING
DESCRIPTORS: ACHING;THROBBING
DESCRIPTORS: THROBBING
DESCRIPTORS: THROBBING
DESCRIPTORS: ACHING;THROBBING

## 2023-10-01 ASSESSMENT — PAIN DESCRIPTION - LOCATION
LOCATION: NECK
LOCATION: HEAD;NECK
LOCATION: NECK

## 2023-10-01 ASSESSMENT — PAIN - FUNCTIONAL ASSESSMENT
PAIN_FUNCTIONAL_ASSESSMENT: ACTIVITIES ARE NOT PREVENTED

## 2023-10-01 ASSESSMENT — PAIN SCALES - WONG BAKER
WONGBAKER_NUMERICALRESPONSE: 0

## 2023-10-01 NOTE — PROGRESS NOTES
Patient admitted to rehab with non traumatic intracranial hemmorhage. A/Ox4. Transfers with berny plus x2/maxi move x3-4. Mobility restrictions: L side flaccid WBAT. On dysphagia soft and bite sized diet, tolerating well. Medications taken whole with pudding/applesauce. On none for DVT prophylaxis. Skin: intact. Oxygen: RA. LDA: NONE. Has been continent of bowel and incontinent of bladder. LBM 9/29. Chair/bed alarms in use and call light in reach. Will monitor for safety. Pain controlled well with muscle relaxer.

## 2023-10-01 NOTE — PROGRESS NOTES
Patient admitted to rehab with nontraumatic intracranial hemorrhage. A/Ox4. Transfers with Mary Carmen Bent plus or Maxi move. Mobility restrictions: flaccid left side. On soft, bite sized diet, thin liquids. Medications taken whole, one at a time in pudding. Skin: intact. Oxygen: RA. Has been incontinent of bladder this shift. LBM 9/29/23. Pt received prn pain meds x2 this shift (see MAR). No hallucinations, and no unusual thoughts or statements expressed or observed this shift. Pt resting quietly in bed at this time Bed alarm on and call light in reach.

## 2023-10-01 NOTE — PLAN OF CARE
Problem: Discharge Planning  Goal: Discharge to home or other facility with appropriate resources  10/1/2023 0123 by Zoya Hogan RN  Outcome: Progressing  Flowsheets (Taken 10/1/2023 0123)  Discharge to home or other facility with appropriate resources:   Identify barriers to discharge with patient and caregiver   Identify discharge learning needs (meds, wound care, etc)     Problem: Pain  Goal: Verbalizes/displays adequate comfort level or baseline comfort level  10/1/2023 0123 by Zoya Hogan RN  Outcome: Progressing  Flowsheets (Taken 10/1/2023 0123)  Verbalizes/displays adequate comfort level or baseline comfort level:   Encourage patient to monitor pain and request assistance   Assess pain using appropriate pain scale   Administer analgesics based on type and severity of pain and evaluate response   Implement non-pharmacological measures as appropriate and evaluate response     Problem: Skin/Tissue Integrity  Goal: Absence of new skin breakdown  Description: 1. Monitor for areas of redness and/or skin breakdown  2. Assess vascular access sites hourly  3. Every 4-6 hours minimum:  Change oxygen saturation probe site  4. Every 4-6 hours:  If on nasal continuous positive airway pressure, respiratory therapy assess nares and determine need for appliance change or resting period. 10/1/2023 0123 by Zoya Hogan RN  Outcome: Progressing  Note: Skin assessment completed on admission and every shift. Barrier wipes used in the event of incontinence. Pressure relief techniques used as needed while in chair and in bed. Position changes encouraged at least every two hours while in bed.         Problem: ABCDS Injury Assessment  Goal: Absence of physical injury  10/1/2023 0123 by Zoya Hogan RN  Outcome: Progressing  Flowsheets (Taken 10/1/2023 0123)  Absence of Physical Injury: Implement safety measures based on patient assessment     Problem: Musculoskeletal - Adult  Goal: Return mobility to safest level

## 2023-10-02 LAB
ANION GAP SERPL CALCULATED.3IONS-SCNC: 10 MMOL/L (ref 3–16)
BACTERIA BLD CULT: NORMAL
BASOPHILS # BLD: 0.1 K/UL (ref 0–0.2)
BASOPHILS NFR BLD: 0.5 %
BUN SERPL-MCNC: 22 MG/DL (ref 7–20)
CALCIUM SERPL-MCNC: 9.3 MG/DL (ref 8.3–10.6)
CHLORIDE SERPL-SCNC: 103 MMOL/L (ref 99–110)
CO2 SERPL-SCNC: 21 MMOL/L (ref 21–32)
CREAT SERPL-MCNC: 1.2 MG/DL (ref 0.9–1.3)
DEPRECATED RDW RBC AUTO: 12.3 % (ref 12.4–15.4)
EKG ATRIAL RATE: 68 BPM
EKG DIAGNOSIS: NORMAL
EKG P AXIS: 52 DEGREES
EKG P-R INTERVAL: 132 MS
EKG Q-T INTERVAL: 410 MS
EKG QRS DURATION: 152 MS
EKG QTC CALCULATION (BAZETT): 435 MS
EKG R AXIS: -8 DEGREES
EKG T AXIS: -8 DEGREES
EKG VENTRICULAR RATE: 68 BPM
EOSINOPHIL # BLD: 0.1 K/UL (ref 0–0.6)
EOSINOPHIL NFR BLD: 1 %
GFR SERPLBLD CREATININE-BSD FMLA CKD-EPI: >60 ML/MIN/{1.73_M2}
GLUCOSE BLD-MCNC: 113 MG/DL (ref 70–99)
GLUCOSE SERPL-MCNC: 139 MG/DL (ref 70–99)
HCT VFR BLD AUTO: 40.2 % (ref 40.5–52.5)
HGB BLD-MCNC: 13.4 G/DL (ref 13.5–17.5)
LYMPHOCYTES # BLD: 2.4 K/UL (ref 1–5.1)
LYMPHOCYTES NFR BLD: 21.2 %
MCH RBC QN AUTO: 31.8 PG (ref 26–34)
MCHC RBC AUTO-ENTMCNC: 33.3 G/DL (ref 31–36)
MCV RBC AUTO: 95.6 FL (ref 80–100)
MONOCYTES # BLD: 0.8 K/UL (ref 0–1.3)
MONOCYTES NFR BLD: 6.9 %
NEUTROPHILS # BLD: 8.1 K/UL (ref 1.7–7.7)
NEUTROPHILS NFR BLD: 70.4 %
PERFORMED ON: ABNORMAL
PLATELET # BLD AUTO: 294 K/UL (ref 135–450)
PMV BLD AUTO: 8.3 FL (ref 5–10.5)
POTASSIUM SERPL-SCNC: 4.2 MMOL/L (ref 3.5–5.1)
RBC # BLD AUTO: 4.21 M/UL (ref 4.2–5.9)
SODIUM SERPL-SCNC: 134 MMOL/L (ref 136–145)
TROPONIN, HIGH SENSITIVITY: 18 NG/L (ref 0–22)
WBC # BLD AUTO: 11.5 K/UL (ref 4–11)

## 2023-10-02 PROCEDURE — 97129 THER IVNTJ 1ST 15 MIN: CPT

## 2023-10-02 PROCEDURE — 85025 COMPLETE CBC W/AUTO DIFF WBC: CPT

## 2023-10-02 PROCEDURE — 97530 THERAPEUTIC ACTIVITIES: CPT

## 2023-10-02 PROCEDURE — 9990000010 HC NO CHARGE VISIT

## 2023-10-02 PROCEDURE — 36415 COLL VENOUS BLD VENIPUNCTURE: CPT

## 2023-10-02 PROCEDURE — 97130 THER IVNTJ EA ADDL 15 MIN: CPT

## 2023-10-02 PROCEDURE — 93005 ELECTROCARDIOGRAM TRACING: CPT | Performed by: PHYSICAL MEDICINE & REHABILITATION

## 2023-10-02 PROCEDURE — 84484 ASSAY OF TROPONIN QUANT: CPT

## 2023-10-02 PROCEDURE — 1280000000 HC REHAB R&B

## 2023-10-02 PROCEDURE — 80048 BASIC METABOLIC PNL TOTAL CA: CPT

## 2023-10-02 PROCEDURE — 93010 ELECTROCARDIOGRAM REPORT: CPT | Performed by: INTERNAL MEDICINE

## 2023-10-02 PROCEDURE — 6370000000 HC RX 637 (ALT 250 FOR IP): Performed by: PHYSICAL MEDICINE & REHABILITATION

## 2023-10-02 RX ORDER — TRAZODONE HYDROCHLORIDE 50 MG/1
50 TABLET ORAL NIGHTLY PRN
Status: DISCONTINUED | OUTPATIENT
Start: 2023-10-02 | End: 2023-10-12 | Stop reason: HOSPADM

## 2023-10-02 RX ADMIN — VALSARTAN 160 MG: 160 TABLET ORAL at 08:05

## 2023-10-02 RX ADMIN — SERTRALINE HYDROCHLORIDE 25 MG: 25 TABLET ORAL at 08:05

## 2023-10-02 RX ADMIN — CLONIDINE HYDROCHLORIDE 0.3 MG: 0.1 TABLET ORAL at 21:22

## 2023-10-02 RX ADMIN — FAMOTIDINE 20 MG: 20 TABLET ORAL at 21:24

## 2023-10-02 RX ADMIN — FAMOTIDINE 20 MG: 20 TABLET ORAL at 07:57

## 2023-10-02 RX ADMIN — TRAMADOL HYDROCHLORIDE 50 MG: 50 TABLET ORAL at 21:25

## 2023-10-02 RX ADMIN — NYSTATIN 500000 UNITS: 100000 SUSPENSION ORAL at 17:50

## 2023-10-02 RX ADMIN — CARVEDILOL 12.5 MG: 12.5 TABLET, FILM COATED ORAL at 07:56

## 2023-10-02 RX ADMIN — NYSTATIN 500000 UNITS: 100000 SUSPENSION ORAL at 08:02

## 2023-10-02 RX ADMIN — HYDRALAZINE HYDROCHLORIDE 100 MG: 50 TABLET, FILM COATED ORAL at 06:16

## 2023-10-02 RX ADMIN — CLONIDINE HYDROCHLORIDE 0.3 MG: 0.1 TABLET ORAL at 14:13

## 2023-10-02 RX ADMIN — NYSTATIN 500000 UNITS: 100000 SUSPENSION ORAL at 14:14

## 2023-10-02 RX ADMIN — HYDRALAZINE HYDROCHLORIDE 100 MG: 50 TABLET, FILM COATED ORAL at 14:12

## 2023-10-02 RX ADMIN — ATORVASTATIN CALCIUM 40 MG: 40 TABLET, FILM COATED ORAL at 08:04

## 2023-10-02 RX ADMIN — TRAZODONE HYDROCHLORIDE 50 MG: 50 TABLET ORAL at 21:53

## 2023-10-02 RX ADMIN — AMLODIPINE BESYLATE 10 MG: 10 TABLET ORAL at 08:04

## 2023-10-02 RX ADMIN — NYSTATIN 500000 UNITS: 100000 SUSPENSION ORAL at 21:22

## 2023-10-02 RX ADMIN — HYDRALAZINE HYDROCHLORIDE 100 MG: 50 TABLET, FILM COATED ORAL at 21:53

## 2023-10-02 RX ADMIN — TRAMADOL HYDROCHLORIDE 50 MG: 50 TABLET ORAL at 06:16

## 2023-10-02 RX ADMIN — SENNOSIDES AND DOCUSATE SODIUM 1 TABLET: 50; 8.6 TABLET ORAL at 21:22

## 2023-10-02 RX ADMIN — CLONIDINE HYDROCHLORIDE 0.3 MG: 0.1 TABLET ORAL at 08:04

## 2023-10-02 ASSESSMENT — PAIN DESCRIPTION - DESCRIPTORS
DESCRIPTORS: ACHING;THROBBING
DESCRIPTORS: ACHING;DISCOMFORT;THROBBING

## 2023-10-02 ASSESSMENT — PAIN DESCRIPTION - ONSET: ONSET: PROGRESSIVE

## 2023-10-02 ASSESSMENT — PAIN DESCRIPTION - ORIENTATION
ORIENTATION: POSTERIOR
ORIENTATION: POSTERIOR

## 2023-10-02 ASSESSMENT — PAIN SCALES - GENERAL
PAINLEVEL_OUTOF10: 9
PAINLEVEL_OUTOF10: 9
PAINLEVEL_OUTOF10: 5

## 2023-10-02 ASSESSMENT — PAIN DESCRIPTION - LOCATION
LOCATION: NECK
LOCATION: NECK

## 2023-10-02 ASSESSMENT — PAIN DESCRIPTION - PAIN TYPE: TYPE: ACUTE PAIN

## 2023-10-02 ASSESSMENT — PAIN DESCRIPTION - FREQUENCY: FREQUENCY: INTERMITTENT

## 2023-10-02 NOTE — PROGRESS NOTES
Occupational Therapy  Facility/Department: [facility]  REHAB  Rehabilitation Occupational Therapy Daily Treatment Note    Date: 10/2/23  Patient Name: Jameson Flores       Room: [place] [address]10/5282-39  MRN: [id number]  Account: [de-identified]   : 1970  (50 y.o.) Gender: male         PM Session: Patient was scheduled for co-treatment at [phone], but patient is on medical hold until troponin levels are read. No results have been given yet. Will check back. 45 minute variance secondary to medical hold. Coty Black, OTR/L #[id number]            Past Medical History:  has a past medical history of CKD (chronic kidney disease), Headache, HLD (hyperlipidemia), Hypertension, Left-sided weakness, NSTEMI (non-ST elevated myocardial infarction) ([address]), and Pancreatitis. Past Surgical History:   has a past surgical history that includes Appendectomy. Restrictions  Restrictions/Precautions: Fall Risk, Aspiration Risk  Other position/activity restrictions: soft & bite sized diet w/ thin liquids, total feed, signficant L neglect and L amando body is flaccid  Equipment Used: Bed, Wheelchair    Subjective  Subjective: met pt in room, pt's friend Joy Klein present in room, agreeable for bedside activities as pt was returned to bed due to becoming diaphoretic and \"anxoius\"  Restrictions/Precautions: Fall Risk;Aspiration Risk             Objective  Vision - Basic Assessment  Patient Visual Report: Balance difficulty; Difficulty maintaining concentration with focus; Unable to keep objects in focus; Eye fatigue/eye pain/headache;Other (add comment)  Cognition  Following Commands: Follows one step commands with increased time; Follows one step commands with repetition  Attention Span: Attends with cues to redirect  Memory: Decreased short term memory  Safety Judgement: Decreased awareness of need for assistance;Decreased awareness of need for safety  Problem Solving: Assistance required to generate solutions;Assistance required to identify

## 2023-10-02 NOTE — PROGRESS NOTES
This is a 50 y.o.  male admitted on 9/19/2023 with Nontraumatic intracranial hemorrhage ([address]) [I62.9]. A&O X 4. Reported pain of 9/10 to the neck. Pain medication given as ordered, see MAR. Active bowel sounds. Last BM 10/1/2023. Patient is incontinent of bladder. Depends on. He on a dysphagia: soft and bite sized diet. Medications taken whole, one at a time with thins. Skin is intact. Transfers with [facility]. HS medication given. Tolerated well. Call light and bedside table within reach. Patient instructed to call if he needs anything.

## 2023-10-02 NOTE — PROGRESS NOTES
Physical Therapy  Attempt Note    Jael Lakeisha  [id number]    Patient was scheduled for co-treatment at [phone], but patient is on medical hold until troponin levels are read. No results have been given yet. Will check back. 45 minute variance secondary to medical hold.      Martin Sanchez, [id number]

## 2023-10-02 NOTE — CARE COORDINATION
Spoke with [employer] [facility] Supervisor who states pt has not been an employee since June 2023 and is not an employee with [facility] either.     [employer] [facility], [address] [facility]     Case Management   [phone]    10/2/2023  3:06 PM

## 2023-10-02 NOTE — PROGRESS NOTES
ACUTE REHAB UNIT  SPEECH/LANGUAGE PATHOLOGY      [x] Daily  [] Weekly Care Conference Note  [] Discharge    Patient:Jono Pierce      TFR:46/2/4958  DWU:[id number]  Rehab Dx/Hx: Nontraumatic intracranial hemorrhage ([address]) [I62.9]    Precautions: Fall risk; left amando; visual deficits; cognitive deficits; dysphagia with aspiration risk  Home situation: Lives with family  ST Dx: [] Aphasia  [x] Dysarthria  [] Apraxia   [x] Oropharyngeal dysphagia [x] Cognitive Impairment  [] Other:   Initial Speech Therapy Assessment Diagnosis:   1. Cognitive Diagnosis: pt demonstrating mild to marked cognitive -linguistic deficits in domains of sustained/focused attention; left visual attention; insight/PS/VPS for safety ; working memory and STM; reasoning and math language. Pt with visual deficits characterized by poor attention to the left; left body neglect. Pt lethargy noted but pt was able to both physically / verbally actively participate in varied tasks. Flat affect and overall poor sustained visual attention even when right of midline. Problems wtih distractibility/task extinction; poor body awareness; safety awareness. Will initiate therapy and target attention; working memory and safety insight; and visual language function (using visual sscanning strategies for the latter)  2. Speech Diagnosis: Dysarthria features most symptomatic as pt fatigues with increase in phonemic distortions 2/2 to left om weakness which is exacerbated when fatigued. Pt is able to achieve audible/intelligible connected speech  3. Communication Diagnosis: Auditory processing is most optimal with concrete , short questions/statements and simple one step commands. Pt with attention/working memorya nd concern for complex auditory processing deficits. Visual language processing is markedly reduced due to visual inattention deficits and reduced pt insight.  Verbal expression is functional for conveying needs/wants; concrete confrontation naming and

## 2023-10-02 NOTE — PLAN OF CARE
Problem: Discharge Planning  Goal: Discharge to home or other facility with appropriate resources  Outcome: Progressing     Problem: Pain  Goal: Verbalizes/displays adequate comfort level or baseline comfort level  Outcome: Progressing     Problem: Skin/Tissue Integrity  Goal: Absence of new skin breakdown  Description: 1. Monitor for areas of redness and/or skin breakdown  2. Assess vascular access sites hourly  3. Every 4-6 hours minimum:  Change oxygen saturation probe site  4. Every 4-6 hours:  If on nasal continuous positive airway pressure, respiratory therapy assess nares and determine need for appliance change or resting period.   Outcome: Progressing     Problem: ABCDS Injury Assessment  Goal: Absence of physical injury  Outcome: Progressing     Problem: Neurosensory - Adult  Goal: Achieves stable or improved neurological status  Outcome: Progressing     Problem: Neurosensory - Adult  Goal: Achieves maximal functionality and self care  Outcome: Progressing     Problem: Skin/Tissue Integrity - Adult  Goal: Skin integrity remains intact  Outcome: Progressing     Problem: Infection - Adult  Goal: Absence of infection at discharge  Outcome: Progressing     Problem: Infection - Adult  Goal: Absence of infection during hospitalization  Outcome: Progressing     Problem: Genitourinary - Adult  Goal: Absence of urinary retention  Outcome: Progressing     Problem: Metabolic/Fluid and Electrolytes - Adult  Goal: Electrolytes maintained within normal limits  Outcome: Progressing

## 2023-10-02 NOTE — PROGRESS NOTES
Normocephalic, atraumatic. Normal sclera/conjunctiva. MMM. CV: Regular rate and rhythm. Resp: No respiratory distress. Lungs CTAB. Abd: Soft, nontender, nondistended, NABS+   Ext: No edema. Neuro: Alert. Oriented to person, place, situation. Right gaze preference, left neglect. Left hemiplegia, 0/5 except 1/5 left biceps. Psych: Cooperative, appropriate mood and affect    Laboratory data: Available via EMR.    Last 24 hour lab  Recent Results (from the past 24 hour(s))   Basic Metabolic Panel w/ Reflex to MG    Collection Time: 10/01/23 10:44 AM   Result Value Ref Range    Sodium 135 (L) 136 - 145 mmol/L    Potassium reflex Magnesium 4.4 3.5 - 5.1 mmol/L    Chloride 100 99 - 110 mmol/L    CO2 23 21 - 32 mmol/L    Anion Gap 12 3 - 16    Glucose 133 (H) 70 - 99 mg/dL    BUN 20 7 - 20 mg/dL    Creatinine 1.3 0.9 - 1.3 mg/dL    Est, Glom Filt Rate >60 >60    Calcium 9.4 8.3 - 10.6 mg/dL   CBC with Auto Differential    Collection Time: 10/01/23 10:44 AM   Result Value Ref Range    WBC 10.5 4.0 - 11.0 K/uL    RBC 4.18 (L) 4.20 - 5.90 M/uL    Hemoglobin 13.8 13.5 - 17.5 g/dL    Hematocrit 39.4 (L) 40.5 - 52.5 %    MCV 94.2 80.0 - 100.0 fL    MCH 32.9 26.0 - 34.0 pg    MCHC 34.9 31.0 - 36.0 g/dL    RDW 12.5 12.4 - 15.4 %    Platelets 409 767 - 887 K/uL    MPV 7.8 5.0 - 10.5 fL    Neutrophils % 68.9 %    Lymphocytes % 23.6 %    Monocytes % 5.7 %    Eosinophils % 1.1 %    Basophils % 0.7 %    Neutrophils Absolute 7.2 1.7 - 7.7 K/uL    Lymphocytes Absolute 2.5 1.0 - 5.1 K/uL    Monocytes Absolute 0.6 0.0 - 1.3 K/uL    Eosinophils Absolute 0.1 0.0 - 0.6 K/uL    Basophils Absolute 0.1 0.0 - 0.2 K/uL   TSH with Reflex    Collection Time: 10/01/23 10:44 AM   Result Value Ref Range    TSH 6.84 (H) 0.27 - 4.20 uIU/mL   Vitamin B12 & Folate    Collection Time: 10/01/23 10:44 AM   Result Value Ref Range    Vitamin B-12 828 211 - 911 pg/mL    Folate 5.54 4.78 - 24.20 ng/mL   T4, Free    Collection Time: 10/01/23 10:44 AM

## 2023-10-02 NOTE — PLAN OF CARE
Problem: Discharge Planning  Goal: Discharge to home or other facility with appropriate resources  Outcome: Progressing  Flowsheets (Taken 10/2/2023 1346)  Discharge to home or other facility with appropriate resources: Identify barriers to discharge with patient and caregiver     Problem: Pain  Goal: Verbalizes/displays adequate comfort level or baseline comfort level  Outcome: Progressing  Flowsheets (Taken 10/2/2023 1346)  Verbalizes/displays adequate comfort level or baseline comfort level:   Encourage patient to monitor pain and request assistance   Assess pain using appropriate pain scale   Administer analgesics based on type and severity of pain and evaluate response   Implement non-pharmacological measures as appropriate and evaluate response     Problem: Skin/Tissue Integrity  Goal: Absence of new skin breakdown  Description: 1. Monitor for areas of redness and/or skin breakdown  2. Assess vascular access sites hourly  3. Every 4-6 hours minimum:  Change oxygen saturation probe site  4. Every 4-6 hours:  If on nasal continuous positive airway pressure, respiratory therapy assess nares and determine need for appliance change or resting period.   Outcome: Progressing     Problem: ABCDS Injury Assessment  Goal: Absence of physical injury  Outcome: Progressing  Flowsheets (Taken 10/2/2023 1346)  Absence of Physical Injury: Implement safety measures based on patient assessment     Problem: Neurosensory - Adult  Goal: Achieves stable or improved neurological status  Outcome: Progressing  Flowsheets (Taken 10/2/2023 1346)  Achieves stable or improved neurological status: Assess for and report changes in neurological status     Problem: Neurosensory - Adult  Goal: Achieves maximal functionality and self care  Outcome: Progressing  Flowsheets (Taken 10/2/2023 1346)  Achieves maximal functionality and self care:   Monitor swallowing and airway patency with patient fatigue and changes in neurological status

## 2023-10-02 NOTE — PROGRESS NOTES
Physical Therapy  Facility/Department: [address] REHAB  Rehabilitation Physical Therapy Treatment Note    NAME: Cheryl Barragan  : 1970 (50 y.o.)  MRN: [id number]  CODE STATUS: Full Code    Date of Service: 10/2/23       Restrictions:  Restrictions/Precautions: Fall Risk, Aspiration Risk  Position Activity Restriction  Other position/activity restrictions: soft & bite sized diet w/ thin liquids, total feed, signficant L neglect and L amando body is flaccid  Pertinent medical information:  Additional Pertinent Hx: per Dr. Grande Ek - Patient is a 51 yo M with pmh NSTEMI () RBBB, HTN, HLD, CKD III, and chronic pancreatitis who initially presented to  on 2023 with acute onset left side weakness. He had been complaining of headache throughout the day prior to this. CT head revealed acute basal ganglia and right frontal lobe intraparenchymal hemorrhage w/ significant mass effect on the lateral ventricles R>L and leftward midline shift. CTA negative for vascular malformation. Systolic blood pressure was reportedly in the 250s on arrival. He received 500ml hypertonic saline, Keppra load, and was started on nicardipine gtt prior to being admitted to NSICU. He was started on PO HTN meds and weaned off nicardipine gttt on . Repeat [facility]  was stable. Course was complicated by dysphagia, difficult to control HTN. Now presents to ARU with impaired mobility, self-care, and cognition below his baseline. Currently, patient reports intermittent headache. He has severe left side weakness and decreased sensation left side. He has slurred speech. He has intermittent blurred vision. He is motivated to start inpatient rehabilitation program.       SUBJECTIVE  Subjective  Subjective: Patient supine in hospital bed at start of session. Patient in good spirits. Pain: Reports posterior neck stiffnes from positioning. Did not report numerical value.         Social/Functional History  Lives With: Family (niece Milton

## 2023-10-02 NOTE — PATIENT CARE CONFERENCE
diet, safety and fall prevention. MEDICAL      TEAM SUMMARY AND DISCHARGE PLAN  Estimated Length of Stay:10/12/2023  Destination: home health  Anticipated Services at Discharge:    [x] OT  [x] PT   [x] SLP    [x] RN   [] Home Health aide []   Community Resources: _______________________________  Equipment recommendations:  [] Hospital bed [] Tub bench  [] Shower chair [] Hand held shower  [] Raised toilet seat [] Toilet safety frame [] Bedside commode   [] W/C: _____  [] Rolling Walker [] Standard walker [] Gait belt [] cane: _________  [] Sliding board [] Alternate seating/furniture [] O2 [] Hip Kit: _______  [] Life Line [] Other: _______  Factors facilitating achievement of predicted outcomes: Family support, Friend support, and Sense of humor  Barriers to the achievement of predicted outcomes/Interventions:    L side flaccid, neglect, poor balance/proprioception/motor planning      Interdisciplinary Individualized Plan of Care Review:    Continue Current Plan of Care: Yes    Modifications:_____________________________    Special Needs in the Upcoming Week :    [] Family/Caregiver Education  [] Home visit  []Therapeutic Pass   [] Consults:_______    [] Other;_______    Patient Rehab Team Goals for the Upcoming Week:  1. Perform transfers with [place]ina [place] with all staff. 2. Toilet with assist of 2 consistently  3. Tolerate diet without medical complications   4. Demonstrate improved focused attention for following commands and left visual attention during basic DL tasks with max verbal cues and tactile/physical lcues        Team Members Present at Conference:  Physician:Dr. Montse Oneil MD  : Cami Tanner [place]    Occupational Therapist: Adam Earl, OTR/L [id number]  [facility], [id number]\\  Speech Therapist: Marge Alarcon. Flum,MS,CCC,SLP [id number]  Speech and Language Pathologist    ARU Supervisor:Lynn Wang RN CRRN  Dietician: Poncho Chew RD, SHOBHA  Psychologist:  Other:      I led

## 2023-10-02 NOTE — PROGRESS NOTES
Patient admitted to rehab with nontraumatic intracranial hemorrhage. A/Ox4. Transfers with [facility]. Mobility restrictions: left side flaccid. On adult dysphagia soft & bite sized diet, tolerating well (needs asst). Medications taken whole with thins one at a time. On nothing for DVT prophylaxis d/t risk for bleeding. Skin: intact. Oxygen: RA. LDA: none. Has been continent of bowel and incontinent of bladder. LBM 10-2. Chair/bed alarms in use and call light in reach. Will monitor for safety. Had episode of feeling like heart was fluttering this am, checked by Dr Margy Sidhu, no further episodes.

## 2023-10-02 NOTE — PROGRESS NOTES
Patient was sliding out of chair, c/o heart \"flutters\", PT did assist back to bed with nurses assist, Dr. Kelly Kebede here, nursing supervisor aware.

## 2023-10-03 LAB
ANION GAP SERPL CALCULATED.3IONS-SCNC: 11 MMOL/L (ref 3–16)
BASOPHILS # BLD: 0.1 K/UL (ref 0–0.2)
BASOPHILS NFR BLD: 0.6 %
BUN SERPL-MCNC: 24 MG/DL (ref 7–20)
CALCIUM SERPL-MCNC: 9.2 MG/DL (ref 8.3–10.6)
CHLORIDE SERPL-SCNC: 102 MMOL/L (ref 99–110)
CO2 SERPL-SCNC: 22 MMOL/L (ref 21–32)
CREAT SERPL-MCNC: 1.4 MG/DL (ref 0.9–1.3)
DEPRECATED RDW RBC AUTO: 12.3 % (ref 12.4–15.4)
EOSINOPHIL # BLD: 0.1 K/UL (ref 0–0.6)
EOSINOPHIL NFR BLD: 0.9 %
GFR SERPLBLD CREATININE-BSD FMLA CKD-EPI: >60 ML/MIN/{1.73_M2}
GLUCOSE SERPL-MCNC: 150 MG/DL (ref 70–99)
HCT VFR BLD AUTO: 40.5 % (ref 40.5–52.5)
HGB BLD-MCNC: 13.7 G/DL (ref 13.5–17.5)
LYMPHOCYTES # BLD: 2.4 K/UL (ref 1–5.1)
LYMPHOCYTES NFR BLD: 22.2 %
MCH RBC QN AUTO: 32.5 PG (ref 26–34)
MCHC RBC AUTO-ENTMCNC: 34 G/DL (ref 31–36)
MCV RBC AUTO: 95.8 FL (ref 80–100)
MONOCYTES # BLD: 0.7 K/UL (ref 0–1.3)
MONOCYTES NFR BLD: 6.6 %
NEUTROPHILS # BLD: 7.4 K/UL (ref 1.7–7.7)
NEUTROPHILS NFR BLD: 69.7 %
PLATELET # BLD AUTO: 291 K/UL (ref 135–450)
PMV BLD AUTO: 7.8 FL (ref 5–10.5)
POTASSIUM SERPL-SCNC: 4.4 MMOL/L (ref 3.5–5.1)
RBC # BLD AUTO: 4.23 M/UL (ref 4.2–5.9)
SODIUM SERPL-SCNC: 135 MMOL/L (ref 136–145)
WBC # BLD AUTO: 10.7 K/UL (ref 4–11)

## 2023-10-03 PROCEDURE — 85025 COMPLETE CBC W/AUTO DIFF WBC: CPT

## 2023-10-03 PROCEDURE — 1280000000 HC REHAB R&B

## 2023-10-03 PROCEDURE — 97530 THERAPEUTIC ACTIVITIES: CPT

## 2023-10-03 PROCEDURE — 6370000000 HC RX 637 (ALT 250 FOR IP): Performed by: PHYSICAL MEDICINE & REHABILITATION

## 2023-10-03 PROCEDURE — 94760 N-INVAS EAR/PLS OXIMETRY 1: CPT

## 2023-10-03 PROCEDURE — 97535 SELF CARE MNGMENT TRAINING: CPT

## 2023-10-03 PROCEDURE — 97542 WHEELCHAIR MNGMENT TRAINING: CPT

## 2023-10-03 PROCEDURE — 80048 BASIC METABOLIC PNL TOTAL CA: CPT

## 2023-10-03 PROCEDURE — 36415 COLL VENOUS BLD VENIPUNCTURE: CPT

## 2023-10-03 PROCEDURE — 92526 ORAL FUNCTION THERAPY: CPT

## 2023-10-03 PROCEDURE — 97129 THER IVNTJ 1ST 15 MIN: CPT

## 2023-10-03 RX ADMIN — BUTALBITAL, ACETAMINOPHEN AND CAFFEINE 1 TABLET: 325; 50; 40 TABLET ORAL at 10:15

## 2023-10-03 RX ADMIN — SENNOSIDES AND DOCUSATE SODIUM 1 TABLET: 50; 8.6 TABLET ORAL at 21:05

## 2023-10-03 RX ADMIN — NYSTATIN 500000 UNITS: 100000 SUSPENSION ORAL at 21:05

## 2023-10-03 RX ADMIN — TRAMADOL HYDROCHLORIDE 50 MG: 50 TABLET ORAL at 20:33

## 2023-10-03 RX ADMIN — FAMOTIDINE 20 MG: 20 TABLET ORAL at 21:05

## 2023-10-03 RX ADMIN — SENNOSIDES AND DOCUSATE SODIUM 1 TABLET: 50; 8.6 TABLET ORAL at 10:09

## 2023-10-03 RX ADMIN — CARVEDILOL 12.5 MG: 12.5 TABLET, FILM COATED ORAL at 10:04

## 2023-10-03 RX ADMIN — SERTRALINE HYDROCHLORIDE 25 MG: 25 TABLET ORAL at 10:09

## 2023-10-03 RX ADMIN — ATORVASTATIN CALCIUM 40 MG: 40 TABLET, FILM COATED ORAL at 09:59

## 2023-10-03 RX ADMIN — NYSTATIN 500000 UNITS: 100000 SUSPENSION ORAL at 13:47

## 2023-10-03 RX ADMIN — NYSTATIN 500000 UNITS: 100000 SUSPENSION ORAL at 16:48

## 2023-10-03 RX ADMIN — VALSARTAN 160 MG: 160 TABLET ORAL at 09:58

## 2023-10-03 RX ADMIN — AMLODIPINE BESYLATE 10 MG: 10 TABLET ORAL at 10:04

## 2023-10-03 RX ADMIN — NYSTATIN 500000 UNITS: 100000 SUSPENSION ORAL at 10:09

## 2023-10-03 RX ADMIN — FAMOTIDINE 20 MG: 20 TABLET ORAL at 10:09

## 2023-10-03 RX ADMIN — CLONIDINE HYDROCHLORIDE 0.3 MG: 0.1 TABLET ORAL at 10:03

## 2023-10-03 RX ADMIN — CLONIDINE HYDROCHLORIDE 0.3 MG: 0.1 TABLET ORAL at 21:05

## 2023-10-03 RX ADMIN — TRAZODONE HYDROCHLORIDE 50 MG: 50 TABLET ORAL at 21:05

## 2023-10-03 ASSESSMENT — PAIN SCALES - GENERAL
PAINLEVEL_OUTOF10: 8
PAINLEVEL_OUTOF10: 3
PAINLEVEL_OUTOF10: 0
PAINLEVEL_OUTOF10: 5

## 2023-10-03 ASSESSMENT — PAIN DESCRIPTION - LOCATION
LOCATION: HEAD
LOCATION: HEAD

## 2023-10-03 ASSESSMENT — PAIN DESCRIPTION - ORIENTATION
ORIENTATION: RIGHT;LEFT;ANTERIOR
ORIENTATION: RIGHT

## 2023-10-03 ASSESSMENT — PAIN DESCRIPTION - DESCRIPTORS
DESCRIPTORS: THROBBING
DESCRIPTORS: ACHING;THROBBING

## 2023-10-03 ASSESSMENT — PAIN DESCRIPTION - FREQUENCY: FREQUENCY: INTERMITTENT

## 2023-10-03 ASSESSMENT — PAIN DESCRIPTION - ONSET: ONSET: PROGRESSIVE

## 2023-10-03 ASSESSMENT — PAIN DESCRIPTION - PAIN TYPE: TYPE: ACUTE PAIN

## 2023-10-03 NOTE — PROGRESS NOTES
Comprehensive Nutrition Assessment    Type and Reason for Visit:  Reassess    Nutrition Recommendations/Plan:   Continue current diet. Continue magic cup     Malnutrition Assessment:  Malnutrition Status: At risk for malnutrition (Comment) (10/03/23 1022)    Context:  Acute Illness     Findings of the 6 clinical characteristics of malnutrition:  Energy Intake:  75% or less of estimated energy requirements for 7 or more days  Weight Loss:  5% over 1 month     Body Fat Loss:  No significant body fat loss     Muscle Mass Loss:  No significant muscle mass loss    Fluid Accumulation:  No significant fluid accumulation     Strength:  Not Performed    Nutrition Assessment:    FU  - Pt. admitted for nontraumatic intracranial hemorrhage. Currently receiving soft and bite sized diet. Thickened liquids discontinued, Pt. now on thins. Meal intakes appear okay, can be inadequate at times. Magic cup started at last assessment. Supplement inakes are good. No new RD recommendations at this time. Nutrition Related Findings:    Na 135, BUN 24, Cr. 1.4, . LBM 10/2 Wound Type: None       Current Nutrition Intake & Therapies:    Average Meal Intake: %, 26-50%, 1-25%  Average Supplements Intake: Unable to assess  ADULT ORAL NUTRITION SUPPLEMENT; Lunch, Dinner; Frozen Oral Supplement  ADULT DIET; Dysphagia - Soft and Bite Sized    Anthropometric Measures:  Height: 6' (182.9 cm)  Ideal Body Weight (IBW): 178 lbs (81 kg)       Current Body Weight: 187 lb 6.3 oz (85 kg), 105.3 % IBW. Current BMI (kg/m2): 25.4                          BMI Categories: Overweight (BMI 25.0-29. 9)    Estimated Daily Nutrient Needs:  Energy Requirements Based On: Kcal/kg  Weight Used for Energy Requirements: Usual  Energy (kcal/day): 1860-2046kcal (20-22kcal/kg)  Weight Used for Protein Requirements: Usual  Protein (g/day): 93-112g (1-1.2g/kg)  Method Used for Fluid Requirements: 1 ml/kcal  Fluid (ml/day): Or per provider.     Nutrition

## 2023-10-03 NOTE — PROGRESS NOTES
50 y.o. patient admitted to rehab with nontraumatic IC. A/Ox4. Transfers with berny plus x2 or maxi move. Mobility restrictions: flaccid R side. On adult dysphagia diet, bite sized. Has poor appetite. Medications taken one at a time, whole w/ pudding. On no meds for DVT prophylaxis d/t bleed risk. Skin: intact. Oxygen: RA. LDA: no PIV. Has been incontinent of bowel and incontinent of bladder. LBM 10/2. Chair/bed alarms in use and call light in reach.

## 2023-10-03 NOTE — PROGRESS NOTES
facilitate safe discharge. Plan will be presented to patient/family (if available). Anticipated Dispo: anticipating need for SNF  ELOS: 10/12      Madeline.  John Montana MD 10/3/2023, 10:12 AM

## 2023-10-03 NOTE — PLAN OF CARE
Problem: Discharge Planning  Goal: Discharge to home or other facility with appropriate resources  10/2/2023 2326 by Marcela Art RN  Outcome: Progressing  Flowsheets (Taken 10/2/2023 2326)  Discharge to home or other facility with appropriate resources:   Identify barriers to discharge with patient and caregiver   Arrange for needed discharge resources and transportation as appropriate   Identify discharge learning needs (meds, wound care, etc)   Arrange for interpreters to assist at discharge as needed   Refer to discharge planning if patient needs post-hospital services based on physician order or complex needs related to functional status, cognitive ability or social support system     Problem: Pain  Goal: Verbalizes/displays adequate comfort level or baseline comfort level  10/2/2023 2326 by Marcela Art RN  Outcome: Progressing  Flowsheets (Taken 10/2/2023 2326)  Verbalizes/displays adequate comfort level or baseline comfort level:   Notify Licensed Independent Practitioner if interventions unsuccessful or patient reports new pain   Consider cultural and social influences on pain and pain management   Implement non-pharmacological measures as appropriate and evaluate response   Assess pain using appropriate pain scale   Encourage patient to monitor pain and request assistance   Administer analgesics based on type and severity of pain and evaluate response

## 2023-10-03 NOTE — PROGRESS NOTES
Occupational Therapy  [facility]  Rehabilitation Occupational Therapy Daily Treatment Note    Date: 10/3/23  Patient Name: Pam Portillo       Room: [address]76  MRN: [id number]  Account: [de-identified]   : 1970  (50 y.o.) Gender: male            PM session: met in therapy gym for OT/PT co tx. He is slouched in high back w/c, maximove pad underneath him. He is reporting a headache. Required total A of 2 to reposition him more upright, L UE supported by 1/2 lap tray. Pt reported L side chest pain and \"flutters. \"  PT/S took BP--97/52 --pt reclined back in high back w/c and took another BP--110/73. He said his headache was \"going away\" and asked to go outside. Pt on patio to work on w/c mobility using R UE & LE--required min A to guide him on straight path as he deviated to L, does not turn head to midline or left side to check for obstacles, only propels 5 ft & stops to talk, pt could not repetitively propel himself to mailbox--went a total of 34' w/ min A & cues, needed several rest breaks. Pt became sweaty & was brought back inside. After he cooled off, OT & PT/S prepared standing in parallel bars, he again reported dizziness, BP taken in sitting 93/65. He was transported back to his room to place him back in bed. Pt required max A of 1 for sitting on EOB; needed max A of 2 for sit pivot w/c to bed, needed max A to 2 for supine to sit and reposition in middle. BP taken in supine at 103/67 RN in room & given update on this pt. Tx time: 45 min, cont w/ POC Star Kingsley OTR/L #[id number]         Past Medical History:  has a past medical history of CKD (chronic kidney disease), Headache, HLD (hyperlipidemia), Hypertension, Left-sided weakness, NSTEMI (non-ST elevated myocardial infarction) ([address]), and Pancreatitis. Past Surgical History:   has a past surgical history that includes Appendectomy.     Restrictions  Restrictions/Precautions: Fall Risk, Aspiration Risk  Other

## 2023-10-03 NOTE — PROGRESS NOTES
verbalizations  9/25/2023: Lethargy and max cues for goal directed behavior in pm session when compared to more alert am session  9/26/2023: alert for both sessions (am and pm). However distractible  by both visual/auditory stimuli  9/27/2023: alert for both sessions (am and pm) but pm session confusion almost more of a paranoid nature  9/28/2032: Will request MD orders for diet upgrade to thin liquid with continued soft/bite size food consistency diet trial  9/29/2023: pt appears to be tolerating soft/bite size foods with thin liquids. Ongoing ed pt/staff for positioning/assist at meals  10/2/2023: lethargy which is a status change for this SLP and am treatment sessions with the pt compared to last week. PM session held as reportedly awaiting medical tests as other staff noting status changes. MD documentation noted. 10/3/2023: More alert this am compared to 10/2/2023 session   Plan: Continue as per plan of care. Continued Tx Upon Discharge: ?    [x] Yes [] No [] TBD based on progress while on ARU [] Vital Stim indicated [] Other:   Estimated discharge date: TBD   Discharge recommendations:   [] Home independently  [] Home with assistance []  24 hour supervision  [] ECF [x] Other: TBD     Additional information:     Interventions used during Rehab Stay:  [] Speech/Language Treatment  [] Instruction in HEP [] Group [x] Dysphagia Treatment [x] Cognitive Treatment   [] Other:    Electronically Signed by    Vanessa Bunch. Flum,MS,CCC,SLP [id number]  Speech and Language Pathologist

## 2023-10-03 NOTE — PROGRESS NOTES
BP dropped to 93/65 during afternoon therapy. Pt reported being lightheaded, dizzy and sweaty. Pt returned to bed. BP rechecked by therapy and was 103/69. All afternoon BP meds were held.

## 2023-10-03 NOTE — PLAN OF CARE
Problem: Discharge Planning  Goal: Discharge to home or other facility with appropriate resources  10/3/2023 1046 by Boom Haider RN  Outcome: Progressing  Flowsheets (Taken 10/3/2023 0850)  Discharge to home or other facility with appropriate resources: Identify barriers to discharge with patient and caregiver  10/2/2023 2326 by Justa Maguire RN  Outcome: Progressing  Flowsheets (Taken 10/2/2023 2326)  Discharge to home or other facility with appropriate resources:   Identify barriers to discharge with patient and caregiver   Arrange for needed discharge resources and transportation as appropriate   Identify discharge learning needs (meds, wound care, etc)   Arrange for interpreters to assist at discharge as needed   Refer to discharge planning if patient needs post-hospital services based on physician order or complex needs related to functional status, cognitive ability or social support system     Problem: Pain  Goal: Verbalizes/displays adequate comfort level or baseline comfort level  10/3/2023 1046 by Boom Haider RN  Outcome: Progressing  10/2/2023 2326 by Justa Maguire RN  Outcome: Progressing  Flowsheets (Taken 10/2/2023 2326)  Verbalizes/displays adequate comfort level or baseline comfort level:   Notify Licensed Independent Practitioner if interventions unsuccessful or patient reports new pain   Consider cultural and social influences on pain and pain management   Implement non-pharmacological measures as appropriate and evaluate response   Assess pain using appropriate pain scale   Encourage patient to monitor pain and request assistance   Administer analgesics based on type and severity of pain and evaluate response     Problem: Skin/Tissue Integrity  Goal: Absence of new skin breakdown  Description: 1. Monitor for areas of redness and/or skin breakdown  2. Assess vascular access sites hourly  3. Every 4-6 hours minimum:  Change oxygen saturation probe site  4. Every 4-6 hours:   If

## 2023-10-03 NOTE — PROGRESS NOTES
back. BP recycled 110/73 and patient stated he felt okay. After rest break patient was transported to outside terrace. Patient performed wheelchair mobility 29' with Min A x 1 secondary to path deviation to the left. Patient able to coordinate RLE and RUE to propel wheelchair. Transported patient back inside in wheelchair to parallel bars. Prior to attempting to stand patient reported feeling dizzy BP taken in sitting 93/65. Transported patient back to room secondary to low BP. Performed sit pivot back to hospital bed with two person assist. Max x 1 and Min assist of other person. Patient demonstrated poor sitting balance sitting edge of bed with inability to recover without max assist x1 to maintain midline. Performed sit to supine with max x 2 with patient able to lift RLE into bed and assistance for LLE and trunk. Once settled, notified RN of recent BP readings. Recycled BP in Supine 103/67 pulse 67. Patient left in bed with RN present. A: Patient unable to progress with further mobility secondary to low BP. Will continue to monitor BP and symptoms at later sessions. Safety Device - Type of devices:  [x]  All fall risk precautions in place [x] Bed alarm in place  [x] Call light within reach [] Chair alarm in place [] Positioning belt [] Gait belt [x] Patient at risk for falls [x] Left in bed [] Left in chair [] Telesitter in use [] Sitter present [] Nurse notified []  None      ASSESSMENT/PROGRESS TOWARDS GOALS       Assessment  Assessment: Mr. Junior John demonstrates slight improvements in his functional mobility this AM. Patient is dependent with sit to stand transfers and uses Claudeen Maroon Plus with two person assist to manage LUE. Patient is able to follow simple commands but has decreased attention to tasks and takes significant cues to redirect to tasks. Sitting balance both dynamic and static has improved with the LUE supported.  Patient is able to maintain midline with verbal cues to shift weight and

## 2023-10-03 NOTE — PROGRESS NOTES
Patient admitted to rehab with nontraumatic intracranial hemorrhage. A/Ox4. Transfers with [facility]. Mobility restrictions: left side flaccid. On adult dysphagia soft & bite sized diet, tolerating well (needs asst). Medications taken whole with thins one at a time. On nothing for DVT prophylaxis d/t risk for bleeding. Skin: intact. Oxygen: RA. LDA: none. Has been continent of bowel and incontinent of bladder. LBM 10-2. Bed alarms in use and call light in reach. Family staying overnight @ bedside. Pain rated 9/10, Pain medication given as ordered,See MAR. Care on going.

## 2023-10-04 LAB
ANION GAP SERPL CALCULATED.3IONS-SCNC: 11 MMOL/L (ref 3–16)
BASOPHILS # BLD: 0 K/UL (ref 0–0.2)
BASOPHILS NFR BLD: 0.2 %
BUN SERPL-MCNC: 29 MG/DL (ref 7–20)
CALCIUM SERPL-MCNC: 9.6 MG/DL (ref 8.3–10.6)
CHLORIDE SERPL-SCNC: 101 MMOL/L (ref 99–110)
CO2 SERPL-SCNC: 23 MMOL/L (ref 21–32)
CREAT SERPL-MCNC: 1.4 MG/DL (ref 0.9–1.3)
DEPRECATED RDW RBC AUTO: 12.3 % (ref 12.4–15.4)
EOSINOPHIL # BLD: 0.1 K/UL (ref 0–0.6)
EOSINOPHIL NFR BLD: 0.8 %
GFR SERPLBLD CREATININE-BSD FMLA CKD-EPI: >60 ML/MIN/{1.73_M2}
GLUCOSE SERPL-MCNC: 137 MG/DL (ref 70–99)
HCT VFR BLD AUTO: 38.8 % (ref 40.5–52.5)
HGB BLD-MCNC: 13.3 G/DL (ref 13.5–17.5)
LYMPHOCYTES # BLD: 1.6 K/UL (ref 1–5.1)
LYMPHOCYTES NFR BLD: 14.5 %
MCH RBC QN AUTO: 32.7 PG (ref 26–34)
MCHC RBC AUTO-ENTMCNC: 34.4 G/DL (ref 31–36)
MCV RBC AUTO: 95.1 FL (ref 80–100)
MONOCYTES # BLD: 0.6 K/UL (ref 0–1.3)
MONOCYTES NFR BLD: 5.8 %
NEUTROPHILS # BLD: 8.7 K/UL (ref 1.7–7.7)
NEUTROPHILS NFR BLD: 78.7 %
PLATELET # BLD AUTO: 257 K/UL (ref 135–450)
PMV BLD AUTO: 8.3 FL (ref 5–10.5)
POTASSIUM SERPL-SCNC: 4.1 MMOL/L (ref 3.5–5.1)
RBC # BLD AUTO: 4.08 M/UL (ref 4.2–5.9)
SODIUM SERPL-SCNC: 135 MMOL/L (ref 136–145)
WBC # BLD AUTO: 11.1 K/UL (ref 4–11)

## 2023-10-04 PROCEDURE — 97530 THERAPEUTIC ACTIVITIES: CPT

## 2023-10-04 PROCEDURE — 80048 BASIC METABOLIC PNL TOTAL CA: CPT

## 2023-10-04 PROCEDURE — 6370000000 HC RX 637 (ALT 250 FOR IP): Performed by: PHYSICAL MEDICINE & REHABILITATION

## 2023-10-04 PROCEDURE — 1280000000 HC REHAB R&B

## 2023-10-04 PROCEDURE — 97112 NEUROMUSCULAR REEDUCATION: CPT

## 2023-10-04 PROCEDURE — 97130 THER IVNTJ EA ADDL 15 MIN: CPT

## 2023-10-04 PROCEDURE — 92526 ORAL FUNCTION THERAPY: CPT

## 2023-10-04 PROCEDURE — 97535 SELF CARE MNGMENT TRAINING: CPT

## 2023-10-04 PROCEDURE — 94760 N-INVAS EAR/PLS OXIMETRY 1: CPT

## 2023-10-04 PROCEDURE — 97129 THER IVNTJ 1ST 15 MIN: CPT

## 2023-10-04 PROCEDURE — 85025 COMPLETE CBC W/AUTO DIFF WBC: CPT

## 2023-10-04 PROCEDURE — 36415 COLL VENOUS BLD VENIPUNCTURE: CPT

## 2023-10-04 RX ADMIN — FAMOTIDINE 20 MG: 20 TABLET ORAL at 22:21

## 2023-10-04 RX ADMIN — NYSTATIN 500000 UNITS: 100000 SUSPENSION ORAL at 22:21

## 2023-10-04 RX ADMIN — ATORVASTATIN CALCIUM 40 MG: 40 TABLET, FILM COATED ORAL at 09:36

## 2023-10-04 RX ADMIN — NYSTATIN 500000 UNITS: 100000 SUSPENSION ORAL at 14:32

## 2023-10-04 RX ADMIN — SERTRALINE HYDROCHLORIDE 25 MG: 25 TABLET ORAL at 09:36

## 2023-10-04 RX ADMIN — TRAZODONE HYDROCHLORIDE 50 MG: 50 TABLET ORAL at 22:21

## 2023-10-04 RX ADMIN — DICLOFENAC SODIUM 4 G: 10 GEL TOPICAL at 15:53

## 2023-10-04 RX ADMIN — BUTALBITAL, ACETAMINOPHEN AND CAFFEINE 1 TABLET: 325; 50; 40 TABLET ORAL at 07:30

## 2023-10-04 RX ADMIN — FAMOTIDINE 20 MG: 20 TABLET ORAL at 09:36

## 2023-10-04 RX ADMIN — AMLODIPINE BESYLATE 10 MG: 10 TABLET ORAL at 11:42

## 2023-10-04 RX ADMIN — NYSTATIN 500000 UNITS: 100000 SUSPENSION ORAL at 16:59

## 2023-10-04 RX ADMIN — CARVEDILOL 12.5 MG: 12.5 TABLET, FILM COATED ORAL at 17:00

## 2023-10-04 RX ADMIN — NYSTATIN 500000 UNITS: 100000 SUSPENSION ORAL at 09:37

## 2023-10-04 RX ADMIN — SENNOSIDES AND DOCUSATE SODIUM 1 TABLET: 50; 8.6 TABLET ORAL at 22:21

## 2023-10-04 RX ADMIN — TRAMADOL HYDROCHLORIDE 50 MG: 50 TABLET ORAL at 03:23

## 2023-10-04 RX ADMIN — TRAMADOL HYDROCHLORIDE 50 MG: 50 TABLET ORAL at 22:21

## 2023-10-04 RX ADMIN — SENNOSIDES AND DOCUSATE SODIUM 1 TABLET: 50; 8.6 TABLET ORAL at 09:36

## 2023-10-04 ASSESSMENT — PAIN DESCRIPTION - ONSET
ONSET: ON-GOING
ONSET: PROGRESSIVE
ONSET: ON-GOING

## 2023-10-04 ASSESSMENT — PAIN DESCRIPTION - DESCRIPTORS
DESCRIPTORS: ACHING;THROBBING
DESCRIPTORS: ACHING;THROBBING
DESCRIPTORS: ACHING;DISCOMFORT;THROBBING

## 2023-10-04 ASSESSMENT — PAIN DESCRIPTION - ORIENTATION
ORIENTATION: RIGHT;OTHER (COMMENT)
ORIENTATION: RIGHT
ORIENTATION: RIGHT;OTHER (COMMENT)

## 2023-10-04 ASSESSMENT — PAIN SCALES - GENERAL
PAINLEVEL_OUTOF10: 10
PAINLEVEL_OUTOF10: 8
PAINLEVEL_OUTOF10: 9
PAINLEVEL_OUTOF10: 7
PAINLEVEL_OUTOF10: 9

## 2023-10-04 ASSESSMENT — PAIN DESCRIPTION - LOCATION
LOCATION: HEAD

## 2023-10-04 ASSESSMENT — PAIN DESCRIPTION - FREQUENCY
FREQUENCY: INTERMITTENT

## 2023-10-04 ASSESSMENT — PAIN - FUNCTIONAL ASSESSMENT
PAIN_FUNCTIONAL_ASSESSMENT: PREVENTS OR INTERFERES SOME ACTIVE ACTIVITIES AND ADLS
PAIN_FUNCTIONAL_ASSESSMENT: PREVENTS OR INTERFERES SOME ACTIVE ACTIVITIES AND ADLS
PAIN_FUNCTIONAL_ASSESSMENT: ACTIVITIES ARE NOT PREVENTED

## 2023-10-04 ASSESSMENT — PAIN DESCRIPTION - PAIN TYPE
TYPE: ACUTE PAIN

## 2023-10-04 NOTE — CARE COORDINATION
Team Conference held on Tuesday, 10-3-2023. Team reviewed barriers:   impulsive, limited endurance, poor attention, making minimal progress, low Blood pressure with some dizziness. Dr Fred Paul is working on [employer] pressure medications. Team recommends he is a FEED by staff for all his meals. Team recommends continued stay on REhab with DC to SNF on Thursday, 10-. MEt with patient and a friend in the room. Provided information to him regading conference. He was hopeful to return home. Informed him the difference between home vs SNF for a short time. He eagerly wants SNF. Provided him with CMS star rated list of agencies for his review with his family. Call placed to Sister, Edelmira Mae, to leave message asking for returned call.   [facility] [address] [facility]     Case Management   [phone]    10/4/2023  4:53 PM

## 2023-10-04 NOTE — PROGRESS NOTES
Patient admitted to rehab with intracranial hemorrhage. A/Ox4. Transfers with berny plus x 2 or maximove. Patient was not out of bed overnight. Mobility restrictions: He is flaccid to left side and has left sided neglect to body and with vision. On dysphagia, soft, and bite size diet, tolerating fairly well. Medications taken whole one at a time with applesauce, staff must watch for pocketing of medications and food. Skin: intact. Oxygen: RA. LDA: None at this time. Has been incontinent of bladder overnight and is dependent on staff for personal care (2 assist). LBM 10/2. Chair/bed alarms in use and call light in reach. Will monitor for safety. 1 Family member stayed overnight. Patient with continued complaints of headache, and pain to right temple; medicated with prn Oxycodone 10mg per request. Patient states little relief. Patient also given ice for back of neck due to pain. Patient needs encouragement to assist with care and movements in bed. Fall precautions remain in place. Will continue to monitor and assist as needed.

## 2023-10-04 NOTE — PROGRESS NOTES
required cues to slow down his eating as well as clearing all food in his mouth. Pt able to bring a drink to his mouth. Pt used liquid to rinse his mouth. Pt opened his mouth for a check of pocketing and mouth appeared clear. Pt had more food to eat but had to set aside due to cotx with PT. Pt very distracted throughout the session requiring cues to attend to his food. Lower Extremity Dressing  Assistance Level: Requires x 2 assistance;Dependent  Skilled Clinical Factors: Pt stood in the berny plus to manage clothing over hips. Pt required assist to thread his feet in the pants. Pt followed directions to lift his right foot to rachele pants. He required assist to lift his left foot. Putting On/Taking Off Footwear  Assistance Level: Dependent  Skilled Clinical Factors: Required assist to rachele socks and shoes. Pt was able to lift his right foot to rachele socks and shoes. Toileting  Assistance Level: Dependent; Requires x 2 assistance  Skilled Clinical Factors: Pt asked for the urinal.  When attempted to assist him with the urinal noted his pants and depend was already wet. Pt required assist to change depends and pants. Stood in the berny plus to manage depends and pants. Bed To/From Chair  Assistance Level: Dependent; Requires x 2 assistance  Skilled Clinical Factors: Used the berny plus for transfer to the wheelchair. Assessment  Assessment  Assessment: Pt was seen bedside initially to assist with feeding. Pt engaged in feeding this date as he was able to scoop his food and bring to his mouth. Pt also able to grab the cup off the table to take a drink. He required cues for speed of eating and to clear his mouth prior to taking another bite. Completed cotx with PT to  the berny plus. Pt completed transfer using berny plus. Pt was more alert this date and was very talkative requiring cues to stay on task.   Activity Tolerance: Patient tolerated treatment well  Discharge Recommendations:

## 2023-10-04 NOTE — PROGRESS NOTES
lean forward--he allows his chest to collapse on the frame of berny plus and had difficulty keeping R foot directly underneath him. L UE is strapped to armrest. He required max A of 2 to stand inside berny plus, needed max A of 2-3  for weight shifting and stepping w/ R foot, needed total A to lift/move L foot. Pt limited by decreased attention, L neglect & poor proprioception. pt allows his chest to collapse on frame of berny plus. He was able to take a few steps x 3 ft w/ max A of 2-3; allowed rest break & went another 3ft w/ max A x 2-3 using berny plus. The maximove pad was placed in high back w/c and he was taken back to his room via transporter.  Tx time: 39, cont w/ POC Luis Enrique Becerril OTR/L #[id number]     Safety Device - Type of devices:  []  All fall risk precautions in place [] Bed alarm in place  [] Call light within reach [] Chair alarm in place [] Positioning belt [x] Gait belt [] Patient at risk for falls [] Left in bed [x] Left in chair [] Telesitter in use [] Sitter present [] Nurse notified []  None      Therapy Time   Individual Co-treatment   Time In  1230   Time Out  1315   Minutes  45     Electronically signed by ROLANDA Dooley/L #[id number] on 10/4/2023 at 3:05 PM

## 2023-10-04 NOTE — PLAN OF CARE
Problem: Pain  Goal: Verbalizes/displays adequate comfort level or baseline comfort level  Note: Pt able to express presence/absence of pain and rate pain appropriately using numerical scale. Pain/discomfort being managed with PRN analgesics per MD orders (see MAR). Pain assessed every shift and after interventions. Continues to have frequent headaches, patient rated 9 to 10 overnight. Describes as throbbing and achy. Medicated with prn Oxycodone 10 mg with very little relief.

## 2023-10-04 NOTE — PROGRESS NOTES
50 y.o. patient admitted to rehab with nontraumatic IC. A/Ox4. Transfers with berny plus x2 or maxi move. Mobility restrictions: flaccid R side. On adult dysphagia diet, bite sized. Can have cereal. Has poor appetite. Medications taken one at a time, whole w/ pudding or applesauce. On no meds for DVT prophylaxis d/t bleed risk. Skin: intact. Oxygen: RA. LDA: no PIV. Has been incontinent of bowel and continent of bladder. Used urinal today. LBM 10/2. Chair/bed alarms in use and call light in reach.

## 2023-10-04 NOTE — PROGRESS NOTES
full loss of balance backward that required max assist x 1 for sitting balance. Patient sat edge of bed with max assist x1. Performed squat pivot transfer to wheelchair with two person assist max x 2. Patient able to participate in transfer with RLE and reaching with RUE. Transported patient to activity gym with Elena Salvage Plus. Performs sit to  [address] with LUE supported with ACE Wrap. Patient able to ambulate 3'x2  in Elena Salvage Plus x4. Patient required assistance at L Knee for blocking and advancing limp. Person to manage trunk and verbal cues for head control. Person for forward pull at hips to assist with weight shifting. One person to manage direction of Nhi Plus. Required significant verbal cues for head control and step by step sequencing for LE ambulation. A: Patient demonstrated improved tolerance to activity this PM. Patient able to ambulate with Elena Salvage plus 3' x 2 with 4 person assist. Patient with significant decreased attention to task that requires multiple cues to redirect. Will continue to address impairments in later skilled therapy. Safety Device - Type of devices:  []  All fall risk precautions in place [] Bed alarm in place  [] Call light within reach [] Chair alarm in place [] Positioning belt [x] Gait belt [] Patient at risk for falls [] Left in bed [x] Left in chair to go back with transport [] Telesitter in use [] Sitter present [] Nurse notified []  None             ASSESSMENT/PROGRESS TOWARDS GOALS       Assessment  Assessment: Mr. Lucy Rosa demonstrates improved tolerance to activity todays session. Patient able to participate in functional activities with alertness and followed simple commands with less verbal cues. Able to propel wheelchair 150' in manual wheelchair with Min Assist due to path deviation to left. Demonstrated significant lateral lean to right that required verbal cues with patient able to correct without assistance from SPT.  Continues to need Elena Salvage Plus for sit to

## 2023-10-04 NOTE — PLAN OF CARE
Problem: Discharge Planning  Goal: Discharge to home or other facility with appropriate resources  Outcome: Progressing  Flowsheets (Taken 10/4/2023 0735)  Discharge to home or other facility with appropriate resources: Identify barriers to discharge with patient and caregiver     Problem: Pain  Goal: Verbalizes/displays adequate comfort level or baseline comfort level  10/4/2023 1326 by Ashlee Delacruz RN  Outcome: Progressing  10/4/2023 0536 by Renee Cartagena RN  Note: Pt able to express presence/absence of pain and rate pain appropriately using numerical scale. Pain/discomfort being managed with PRN analgesics per MD orders (see MAR). Pain assessed every shift and after interventions. Continues to have frequent headaches, patient rated 9 to 10 overnight. Describes as throbbing and achy. Medicated with prn Oxycodone 10 mg with very little relief. Problem: Skin/Tissue Integrity  Goal: Absence of new skin breakdown  Description: 1. Monitor for areas of redness and/or skin breakdown  2. Assess vascular access sites hourly  3. Every 4-6 hours minimum:  Change oxygen saturation probe site  4. Every 4-6 hours:  If on nasal continuous positive airway pressure, respiratory therapy assess nares and determine need for appliance change or resting period.   Outcome: Progressing     Problem: ABCDS Injury Assessment  Goal: Absence of physical injury  Outcome: Progressing  Flowsheets  Taken 10/4/2023 1324 by Ashlee Delacruz RN  Absence of Physical Injury: Implement safety measures based on patient assessment  Taken 10/4/2023 0318 by Renee Cartagena RN  Absence of Physical Injury: Implement safety measures based on patient assessment     Problem: Neurosensory - Adult  Goal: Achieves stable or improved neurological status  Outcome: Progressing  Flowsheets (Taken 10/4/2023 0735)  Achieves stable or improved neurological status: Assess for and report changes in neurological status  Goal: Achieves

## 2023-10-05 LAB
ANION GAP SERPL CALCULATED.3IONS-SCNC: 11 MMOL/L (ref 3–16)
BASOPHILS # BLD: 0 K/UL (ref 0–0.2)
BASOPHILS NFR BLD: 0.3 %
BUN SERPL-MCNC: 25 MG/DL (ref 7–20)
CALCIUM SERPL-MCNC: 9.2 MG/DL (ref 8.3–10.6)
CHLORIDE SERPL-SCNC: 102 MMOL/L (ref 99–110)
CO2 SERPL-SCNC: 23 MMOL/L (ref 21–32)
CREAT SERPL-MCNC: 1.2 MG/DL (ref 0.9–1.3)
DEPRECATED RDW RBC AUTO: 12.2 % (ref 12.4–15.4)
EOSINOPHIL # BLD: 0.1 K/UL (ref 0–0.6)
EOSINOPHIL NFR BLD: 0.9 %
GFR SERPLBLD CREATININE-BSD FMLA CKD-EPI: >60 ML/MIN/{1.73_M2}
GLUCOSE SERPL-MCNC: 136 MG/DL (ref 70–99)
HCT VFR BLD AUTO: 39.7 % (ref 40.5–52.5)
HGB BLD-MCNC: 13.9 G/DL (ref 13.5–17.5)
LYMPHOCYTES # BLD: 1.9 K/UL (ref 1–5.1)
LYMPHOCYTES NFR BLD: 19.2 %
MCH RBC QN AUTO: 33.3 PG (ref 26–34)
MCHC RBC AUTO-ENTMCNC: 35.1 G/DL (ref 31–36)
MCV RBC AUTO: 94.8 FL (ref 80–100)
MONOCYTES # BLD: 0.8 K/UL (ref 0–1.3)
MONOCYTES NFR BLD: 8.4 %
NEUTROPHILS # BLD: 7.1 K/UL (ref 1.7–7.7)
NEUTROPHILS NFR BLD: 71.2 %
PLATELET # BLD AUTO: 251 K/UL (ref 135–450)
PMV BLD AUTO: 8 FL (ref 5–10.5)
POTASSIUM SERPL-SCNC: 3.9 MMOL/L (ref 3.5–5.1)
RBC # BLD AUTO: 4.18 M/UL (ref 4.2–5.9)
SODIUM SERPL-SCNC: 136 MMOL/L (ref 136–145)
WBC # BLD AUTO: 10 K/UL (ref 4–11)

## 2023-10-05 PROCEDURE — 94760 N-INVAS EAR/PLS OXIMETRY 1: CPT

## 2023-10-05 PROCEDURE — 97129 THER IVNTJ 1ST 15 MIN: CPT

## 2023-10-05 PROCEDURE — 92526 ORAL FUNCTION THERAPY: CPT

## 2023-10-05 PROCEDURE — 97535 SELF CARE MNGMENT TRAINING: CPT

## 2023-10-05 PROCEDURE — 1280000000 HC REHAB R&B

## 2023-10-05 PROCEDURE — 85025 COMPLETE CBC W/AUTO DIFF WBC: CPT

## 2023-10-05 PROCEDURE — 6370000000 HC RX 637 (ALT 250 FOR IP): Performed by: PHYSICAL MEDICINE & REHABILITATION

## 2023-10-05 PROCEDURE — 97110 THERAPEUTIC EXERCISES: CPT

## 2023-10-05 PROCEDURE — 80048 BASIC METABOLIC PNL TOTAL CA: CPT

## 2023-10-05 PROCEDURE — 6370000000 HC RX 637 (ALT 250 FOR IP): Performed by: REGISTERED NURSE

## 2023-10-05 PROCEDURE — 97130 THER IVNTJ EA ADDL 15 MIN: CPT

## 2023-10-05 PROCEDURE — 97530 THERAPEUTIC ACTIVITIES: CPT

## 2023-10-05 PROCEDURE — 97112 NEUROMUSCULAR REEDUCATION: CPT

## 2023-10-05 PROCEDURE — 36415 COLL VENOUS BLD VENIPUNCTURE: CPT

## 2023-10-05 RX ORDER — HYDRALAZINE HYDROCHLORIDE 25 MG/1
25 TABLET, FILM COATED ORAL EVERY 8 HOURS SCHEDULED
Status: DISCONTINUED | OUTPATIENT
Start: 2023-10-05 | End: 2023-10-06

## 2023-10-05 RX ADMIN — ATORVASTATIN CALCIUM 40 MG: 40 TABLET, FILM COATED ORAL at 08:36

## 2023-10-05 RX ADMIN — SENNOSIDES AND DOCUSATE SODIUM 1 TABLET: 50; 8.6 TABLET ORAL at 08:36

## 2023-10-05 RX ADMIN — SERTRALINE HYDROCHLORIDE 25 MG: 25 TABLET ORAL at 08:36

## 2023-10-05 RX ADMIN — AMLODIPINE BESYLATE 10 MG: 10 TABLET ORAL at 08:36

## 2023-10-05 RX ADMIN — HYDRALAZINE HYDROCHLORIDE 25 MG: 25 TABLET, FILM COATED ORAL at 13:04

## 2023-10-05 RX ADMIN — NYSTATIN 500000 UNITS: 100000 SUSPENSION ORAL at 21:22

## 2023-10-05 RX ADMIN — ALPRAZOLAM 0.25 MG: 0.25 TABLET ORAL at 17:44

## 2023-10-05 RX ADMIN — CARVEDILOL 12.5 MG: 12.5 TABLET, FILM COATED ORAL at 08:36

## 2023-10-05 RX ADMIN — FAMOTIDINE 20 MG: 20 TABLET ORAL at 21:22

## 2023-10-05 RX ADMIN — CARVEDILOL 12.5 MG: 12.5 TABLET, FILM COATED ORAL at 17:16

## 2023-10-05 RX ADMIN — FAMOTIDINE 20 MG: 20 TABLET ORAL at 08:36

## 2023-10-05 RX ADMIN — SENNOSIDES AND DOCUSATE SODIUM 1 TABLET: 50; 8.6 TABLET ORAL at 21:22

## 2023-10-05 RX ADMIN — NYSTATIN 500000 UNITS: 100000 SUSPENSION ORAL at 08:36

## 2023-10-05 RX ADMIN — TRAZODONE HYDROCHLORIDE 50 MG: 50 TABLET ORAL at 21:22

## 2023-10-05 RX ADMIN — BUTALBITAL, ACETAMINOPHEN AND CAFFEINE 1 TABLET: 325; 50; 40 TABLET ORAL at 12:43

## 2023-10-05 RX ADMIN — NYSTATIN 500000 UNITS: 100000 SUSPENSION ORAL at 17:16

## 2023-10-05 RX ADMIN — NYSTATIN 500000 UNITS: 100000 SUSPENSION ORAL at 12:44

## 2023-10-05 ASSESSMENT — PAIN SCALES - GENERAL
PAINLEVEL_OUTOF10: 6
PAINLEVEL_OUTOF10: 7
PAINLEVEL_OUTOF10: 3
PAINLEVEL_OUTOF10: 0

## 2023-10-05 ASSESSMENT — PAIN DESCRIPTION - DESCRIPTORS: DESCRIPTORS: ACHING

## 2023-10-05 ASSESSMENT — PAIN - FUNCTIONAL ASSESSMENT: PAIN_FUNCTIONAL_ASSESSMENT: ACTIVITIES ARE NOT PREVENTED

## 2023-10-05 ASSESSMENT — PAIN DESCRIPTION - LOCATION: LOCATION: HEAD

## 2023-10-05 ASSESSMENT — PAIN DESCRIPTION - ORIENTATION: ORIENTATION: RIGHT

## 2023-10-05 ASSESSMENT — PAIN SCALES - WONG BAKER: WONGBAKER_NUMERICALRESPONSE: 0

## 2023-10-05 NOTE — CARE COORDINATION
Rec'd call from niece, Ruth Ortiz, who reports she is his [facility] and for financial needs. She asked for returned call to discuss DC and can be reached at [phone]. Asked the patient if I can include her in dc planning and he affirmed I could. Call placed to Ruth Ortiz. Gave update on his progress. She agrees with plan for SNF DC.  Emailed her a list of CMS star rated list of agencies to her at:    [email or web address]  Reviewed CMS star rating system with her and suggested she select up to four agencies she might want him to go so that I can send referrals. She will send in the Adv Directives she has indicating she is the Agent.   [place], [place]     Case Management   [phone]    10/5/2023  11:56 AM

## 2023-10-05 NOTE — PROGRESS NOTES
cereal): external cues for paced rate of intake. No overt clinical s/s of aspiration; variable oral pocketing which pt could clear with clearance swallow and then clear additional minimal at end of task (took >90% of serving size)     not targeted   The patient will improve oral preparation phase via bolus control/manipulation exercises to 5/5 each trial.,  Left oral motor rom/strengthening and coordination ex targeted:   Tactile rom stretch when alternating volitional labial rounding to retraction: warm up and tactile stretch  Volitional stretch can be moderate left and fairly symmetrical with effort and cues for sustained target    Targeted for:  Bolus control strategies for mixed food/liquid consistencies  Bolus formation/cohesion:  Paced activity which assists. Unpaced pt with disorganized with  occasional mild loss to left lateral sulcus with reduced sensation  Clearance swallow  Oral care      Not targeted   The patient/caregiver will demonstrate understanding of compensatory strategies for improved swallowing safety. Max review and assist for achievement and sustained carryover:   Upright midline head/trunk position   Reduced rate of intake  Compensatory strategies for mixed food/liquid as in cereal (ie straining tsp of excess liquids)  Check for and clear oral pocketing between bites/sips  Oral care via use of toothette between meals   Not targeted        Pt will demonstrate improved swallow response via laryngeal/pharyngeal ex; sensory stimulatino for therapy trials of easy to chew food and/or thin liquids; AND demonstrate improved ease of bolus control for reduced severity /frequence of oral pocketing Targeted with po trials   Not targeted   OM speech and Cognitive-Linguistic Evaluation Goals: Pt goal it to go home and get some sleep.  Pt was agreeable to treatment poc to address strategies for visual attention for functional reading/writing; attention/memory and VPS tasks     Therapy working toward pt

## 2023-10-05 NOTE — PROGRESS NOTES
Patient admitted to rehab with nontraumatic intracranial hemorrhage. A/Ox4. Transfers with maximove x1. Mobility restrictions: WBAT L side flaccid. On osft and bite sized diet, tolerating fair. Medications taken whole with applesauce/thins 1 at a time. On none for DVT prophylaxis. Skin: intact. Oxygen: RA. LDA: NONE. Has been continent of bowel and incontinent of bladder. LBM 10/2. Chair/bed alarms in use and call light in reach. Will monitor for safety.

## 2023-10-05 NOTE — PLAN OF CARE
Problem: Discharge Planning  Goal: Discharge to home or other facility with appropriate resources  10/5/2023 1022 by Terrell Cuadra RN  Outcome: Progressing  Flowsheets (Taken 10/5/2023 0112 by Miguel A Can RN)  Discharge to home or other facility with appropriate resources:   Identify barriers to discharge with patient and caregiver   Arrange for needed discharge resources and transportation as appropriate   Identify discharge learning needs (meds, wound care, etc)   Arrange for interpreters to assist at discharge as needed   Refer to discharge planning if patient needs post-hospital services based on physician order or complex needs related to functional status, cognitive ability or social support system  10/5/2023 0112 by Miguel A Can RN  Outcome: Progressing  Flowsheets  Taken 10/5/2023 0112  Discharge to home or other facility with appropriate resources:   Identify barriers to discharge with patient and caregiver   Arrange for needed discharge resources and transportation as appropriate   Identify discharge learning needs (meds, wound care, etc)   Arrange for interpreters to assist at discharge as needed   Refer to discharge planning if patient needs post-hospital services based on physician order or complex needs related to functional status, cognitive ability or social support system  Taken 10/4/2023 2300  Discharge to home or other facility with appropriate resources: Identify barriers to discharge with patient and caregiver     Problem: Pain  Goal: Verbalizes/displays adequate comfort level or baseline comfort level  10/5/2023 1022 by Terrell Cuadra RN  Outcome: Progressing  Flowsheets (Taken 10/5/2023 1022)  Verbalizes/displays adequate comfort level or baseline comfort level:   Encourage patient to monitor pain and request assistance   Assess pain using appropriate pain scale   Administer analgesics based on type and severity of pain and evaluate response   Implement non-pharmacological

## 2023-10-05 NOTE — PLAN OF CARE
Problem: Discharge Planning  Goal: Discharge to home or other facility with appropriate resources  10/5/2023 0112 by Hermelindo Correia RN  Outcome: Progressing  Flowsheets  Taken 10/5/2023 0112  Discharge to home or other facility with appropriate resources:   Identify barriers to discharge with patient and caregiver   Arrange for needed discharge resources and transportation as appropriate   Identify discharge learning needs (meds, wound care, etc)   Arrange for interpreters to assist at discharge as needed   Refer to discharge planning if patient needs post-hospital services based on physician order or complex needs related to functional status, cognitive ability or social support system  Taken 10/4/2023 2300  Discharge to home or other facility with appropriate resources: Identify barriers to discharge with patient and caregiver     Problem: Pain  Goal: Verbalizes/displays adequate comfort level or baseline comfort level  10/5/2023 0112 by Hermelindo Correia RN  Outcome: Progressing  Flowsheets (Taken 10/5/2023 0112)  Verbalizes/displays adequate comfort level or baseline comfort level:   Notify Licensed Independent Practitioner if interventions unsuccessful or patient reports new pain   Consider cultural and social influences on pain and pain management   Implement non-pharmacological measures as appropriate and evaluate response   Administer analgesics based on type and severity of pain and evaluate response   Assess pain using appropriate pain scale   Encourage patient to monitor pain and request assistance

## 2023-10-05 NOTE — PROGRESS NOTES
Patient admitted to rehab with nontraumatic IC. A/Ox4. Transfers with berny plus x2 or maxi move. Mobility restrictions: flaccid R side. On adult dysphagia diet, bite sized. Can have cereal. Has poor appetite. Medications taken one at a time, whole w/ pudding or applesauce. On no meds for DVT prophylaxis d/t bleed risk. Skin: intact. Oxygen: RA. LDA: no PIV. Has been incontinent of bowel and continent of bladder. LBM. 10/02/23. B/P meds was held during the day, See [facility] for details. Patient seems restless and wake most of the night. Patient B/P running high on BP. Care on going. Monitoring patient B/P closely.

## 2023-10-05 NOTE — PROGRESS NOTES
wheelchair 200ft with supervision for safety. Long Term Goal 5: Ambulate 10ft with RUE support on railing with one person assist and wheelchair follow. PLAN OF CARE/SAFETY  Physcial Therapy Plan  General Plan:  minutes of therapy at least 5 out of 7 days a week  Days Per Week: 5 Days  Therapy Duration: 4 Weeks  Current Treatment Recommendations: Strengthening;Balance training;Functional mobility training;Transfer training; Wheelchair mobility training; Endurance training;Gait training; Safety education & training;Patient/Caregiver education & training; Therapeutic activities; Neuromuscular re-education  Safety Devices  Type of Devices: Patient at risk for falls; Left in chair;Chair alarm in place;Gait belt (pt remained in gym with family until [address])    EDUCATION  Education  Education Given To: Patient  Education Provided: Transfer Training; Safety;Precautions; Mobility Training;Family Education;Visual Perceptual Function  Education Provided Comments: Significant verbal cues for attention to task in session. Verbal and tactile cues for safety and protection of flaccid limb with transfers.  Educated family on exercises in mirror for motor planning and processing R/L sides  Education Method: Verbal;Demonstration  Barriers to Learning: Cognition  Education Outcome: Verbalized understanding;Continued education needed  Skilled Clinical Factors: decreased insight into deficits, L neglect        Therapy Time   Individual Concurrent Group Co-treatment   Time In 0905     1230   Time Out 9780     1259   Minutes 45     45     Timed Code Treatment Minutes: [address], PT, 10/05/23 at 2:24 PM

## 2023-10-05 NOTE — PROGRESS NOTES
Occupational Therapy  Facility/Department: [facility]  Rehabilitation Occupational Therapy Daily Treatment Note    Date: 10/5/23  Patient Name: Jael Suazo       Room: W5F-2204/6421-84  MRN: [id number]  Account: [de-identified]   : 1970  (50 y.o.) Gender: male            PM session: met in room, he is seated in high back recliner, his girlfriend Maury Duarte is present . He did not touch his lunch, it was positioned to R side on table. OT encouraged him to eat his peaches & Magic Cup. Maury Duarte held the cup while he scooped the pudding onto spoon & bring to his mouth (min A for stabilization of cup). He was taken to therapy dept for OT/PT to address standing balance using berny plus. He needed heavy min/mod A of 2 for sit<>stand transitions, once up he is able to stand x 2 minutes w/ min/mod A of 2, tactile assistance to tighten glutes and abs, stick his chest out in order to stand erect. Pt's body tends to twist/collapse to L side, improved ability to keep chest up, but head remains turned to R side. Pt's pants were wet, he was incont of urine but wasn't aware. Pt taken back to his room, used berny plus from w/c<bed w/ mod A of 2; pt sat on EOB less than 3 minutes w/ heavy posterior lean & to L side; pt does not have any righting reaction and required mod/max A w/ sitting balance. Pt needed max A of 2 for sit to supine. He attempted several bridges once OT positioned L knee in flexion but doesn't lift L buttock to doff/don pants; needed total A to place each foot into pants; needed mod A of 2 to roll side to side to change brief, perform teresa hygiene and to pull up clothing. He is distracted w/ talking when OT asked him to clean front privates while supine in bed.  Pt aware he is scheduled for shower as OT/PT co tx in the AM. Tx time: 45 min Zen Maguire OTR/L #[id number]         Past Medical History:  has a past medical history of CKD (chronic kidney disease), Headache, HLD (hyperlipidemia), Hypertension,

## 2023-10-06 LAB
ANION GAP SERPL CALCULATED.3IONS-SCNC: 11 MMOL/L (ref 3–16)
BASOPHILS # BLD: 0 K/UL (ref 0–0.2)
BASOPHILS NFR BLD: 0.5 %
BUN SERPL-MCNC: 22 MG/DL (ref 7–20)
CALCIUM SERPL-MCNC: 8.9 MG/DL (ref 8.3–10.6)
CHLORIDE SERPL-SCNC: 102 MMOL/L (ref 99–110)
CO2 SERPL-SCNC: 23 MMOL/L (ref 21–32)
CREAT SERPL-MCNC: 1.3 MG/DL (ref 0.9–1.3)
DEPRECATED RDW RBC AUTO: 12.3 % (ref 12.4–15.4)
EOSINOPHIL # BLD: 0.1 K/UL (ref 0–0.6)
EOSINOPHIL NFR BLD: 1.3 %
GFR SERPLBLD CREATININE-BSD FMLA CKD-EPI: >60 ML/MIN/{1.73_M2}
GLUCOSE SERPL-MCNC: 135 MG/DL (ref 70–99)
HCT VFR BLD AUTO: 38.9 % (ref 40.5–52.5)
HGB BLD-MCNC: 13.2 G/DL (ref 13.5–17.5)
LYMPHOCYTES # BLD: 2.2 K/UL (ref 1–5.1)
LYMPHOCYTES NFR BLD: 22.5 %
MCH RBC QN AUTO: 32.2 PG (ref 26–34)
MCHC RBC AUTO-ENTMCNC: 34 G/DL (ref 31–36)
MCV RBC AUTO: 94.7 FL (ref 80–100)
MONOCYTES # BLD: 0.8 K/UL (ref 0–1.3)
MONOCYTES NFR BLD: 8.8 %
NEUTROPHILS # BLD: 6.5 K/UL (ref 1.7–7.7)
NEUTROPHILS NFR BLD: 66.9 %
PLATELET # BLD AUTO: 258 K/UL (ref 135–450)
PMV BLD AUTO: 7.8 FL (ref 5–10.5)
POTASSIUM SERPL-SCNC: 4 MMOL/L (ref 3.5–5.1)
RBC # BLD AUTO: 4.1 M/UL (ref 4.2–5.9)
SODIUM SERPL-SCNC: 136 MMOL/L (ref 136–145)
WBC # BLD AUTO: 9.7 K/UL (ref 4–11)

## 2023-10-06 PROCEDURE — 97129 THER IVNTJ 1ST 15 MIN: CPT

## 2023-10-06 PROCEDURE — 97130 THER IVNTJ EA ADDL 15 MIN: CPT

## 2023-10-06 PROCEDURE — 6370000000 HC RX 637 (ALT 250 FOR IP): Performed by: PHYSICAL MEDICINE & REHABILITATION

## 2023-10-06 PROCEDURE — 6370000000 HC RX 637 (ALT 250 FOR IP): Performed by: REGISTERED NURSE

## 2023-10-06 PROCEDURE — 36415 COLL VENOUS BLD VENIPUNCTURE: CPT

## 2023-10-06 PROCEDURE — 1280000000 HC REHAB R&B

## 2023-10-06 PROCEDURE — 97535 SELF CARE MNGMENT TRAINING: CPT

## 2023-10-06 PROCEDURE — 94760 N-INVAS EAR/PLS OXIMETRY 1: CPT

## 2023-10-06 PROCEDURE — 97112 NEUROMUSCULAR REEDUCATION: CPT

## 2023-10-06 PROCEDURE — 97530 THERAPEUTIC ACTIVITIES: CPT

## 2023-10-06 PROCEDURE — 85025 COMPLETE CBC W/AUTO DIFF WBC: CPT

## 2023-10-06 PROCEDURE — 92526 ORAL FUNCTION THERAPY: CPT

## 2023-10-06 PROCEDURE — 80048 BASIC METABOLIC PNL TOTAL CA: CPT

## 2023-10-06 RX ORDER — HYDRALAZINE HYDROCHLORIDE 50 MG/1
50 TABLET, FILM COATED ORAL EVERY 8 HOURS SCHEDULED
Status: DISCONTINUED | OUTPATIENT
Start: 2023-10-06 | End: 2023-10-07

## 2023-10-06 RX ADMIN — AMLODIPINE BESYLATE 10 MG: 10 TABLET ORAL at 10:09

## 2023-10-06 RX ADMIN — NYSTATIN 500000 UNITS: 100000 SUSPENSION ORAL at 10:08

## 2023-10-06 RX ADMIN — SERTRALINE HYDROCHLORIDE 25 MG: 25 TABLET ORAL at 10:09

## 2023-10-06 RX ADMIN — HYDRALAZINE HYDROCHLORIDE 25 MG: 25 TABLET, FILM COATED ORAL at 05:17

## 2023-10-06 RX ADMIN — ATORVASTATIN CALCIUM 40 MG: 40 TABLET, FILM COATED ORAL at 10:09

## 2023-10-06 RX ADMIN — CARVEDILOL 12.5 MG: 12.5 TABLET, FILM COATED ORAL at 10:09

## 2023-10-06 RX ADMIN — NYSTATIN 500000 UNITS: 100000 SUSPENSION ORAL at 18:05

## 2023-10-06 RX ADMIN — HYDRALAZINE HYDROCHLORIDE 50 MG: 50 TABLET, FILM COATED ORAL at 14:16

## 2023-10-06 RX ADMIN — FAMOTIDINE 20 MG: 20 TABLET ORAL at 10:09

## 2023-10-06 RX ADMIN — DICLOFENAC SODIUM 4 G: 10 GEL TOPICAL at 03:16

## 2023-10-06 RX ADMIN — ALPRAZOLAM 0.25 MG: 0.25 TABLET ORAL at 11:06

## 2023-10-06 RX ADMIN — NYSTATIN 500000 UNITS: 100000 SUSPENSION ORAL at 14:16

## 2023-10-06 RX ADMIN — SENNOSIDES AND DOCUSATE SODIUM 1 TABLET: 50; 8.6 TABLET ORAL at 10:09

## 2023-10-06 RX ADMIN — FAMOTIDINE 20 MG: 20 TABLET ORAL at 21:40

## 2023-10-06 RX ADMIN — HYDRALAZINE HYDROCHLORIDE 10 MG: 10 TABLET, FILM COATED ORAL at 18:12

## 2023-10-06 RX ADMIN — HYDRALAZINE HYDROCHLORIDE 50 MG: 50 TABLET, FILM COATED ORAL at 21:41

## 2023-10-06 RX ADMIN — CARVEDILOL 12.5 MG: 12.5 TABLET, FILM COATED ORAL at 18:05

## 2023-10-06 RX ADMIN — HYDRALAZINE HYDROCHLORIDE 10 MG: 10 TABLET, FILM COATED ORAL at 11:27

## 2023-10-06 RX ADMIN — TRAMADOL HYDROCHLORIDE 50 MG: 50 TABLET ORAL at 14:20

## 2023-10-06 RX ADMIN — TRAMADOL HYDROCHLORIDE 50 MG: 50 TABLET ORAL at 03:16

## 2023-10-06 RX ADMIN — NYSTATIN 500000 UNITS: 100000 SUSPENSION ORAL at 21:39

## 2023-10-06 RX ADMIN — SENNOSIDES AND DOCUSATE SODIUM 1 TABLET: 50; 8.6 TABLET ORAL at 21:41

## 2023-10-06 ASSESSMENT — PAIN DESCRIPTION - PAIN TYPE
TYPE: ACUTE PAIN

## 2023-10-06 ASSESSMENT — PAIN DESCRIPTION - DESCRIPTORS
DESCRIPTORS: ACHING;DISCOMFORT

## 2023-10-06 ASSESSMENT — PAIN DESCRIPTION - ONSET
ONSET: ON-GOING

## 2023-10-06 ASSESSMENT — PAIN SCALES - WONG BAKER: WONGBAKER_NUMERICALRESPONSE: 0

## 2023-10-06 ASSESSMENT — PAIN - FUNCTIONAL ASSESSMENT
PAIN_FUNCTIONAL_ASSESSMENT: ACTIVITIES ARE NOT PREVENTED

## 2023-10-06 ASSESSMENT — PAIN SCALES - GENERAL
PAINLEVEL_OUTOF10: 8
PAINLEVEL_OUTOF10: 7
PAINLEVEL_OUTOF10: 0
PAINLEVEL_OUTOF10: 7

## 2023-10-06 ASSESSMENT — PAIN DESCRIPTION - LOCATION
LOCATION: HEAD;NECK
LOCATION: HEAD;NECK
LOCATION: HEAD

## 2023-10-06 ASSESSMENT — PAIN DESCRIPTION - FREQUENCY
FREQUENCY: INTERMITTENT

## 2023-10-06 ASSESSMENT — PAIN DESCRIPTION - ORIENTATION
ORIENTATION: RIGHT

## 2023-10-06 NOTE — PROGRESS NOTES
Patient admitted to rehab with nontraumatic IC. A/Ox4. Transfers with berny plus x2 or maxi move. Mobility restrictions: flaccid R side. On adult dysphagia diet, bite sized. Can have cereal. Has poor appetite. Medications taken one at a time, whole w/ pudding or applesauce. On no meds for DVT prophylaxis d/t bleed risk. Skin: intact. Oxygen: RA. LDA: no PIV. Has been incontinent of bowel and continent of bladder. LBM. 10/05/23. B/P meds was held and had some changes, See [facility] for details. Patient B/P running low on BP. Care on going. Monitoring patient B/P closely.

## 2023-10-06 NOTE — PROGRESS NOTES
ACUTE REHAB UNIT  SPEECH/LANGUAGE PATHOLOGY      [x] Daily  [] Weekly Care Conference Note  [] Discharge    Patient:Jono Gu      GNN:[birth year]  SYA:[id number]  Rehab Dx/Hx: Nontraumatic intracranial hemorrhage ([address]) [I62.9]    Precautions: Fall risk; left amando; visual deficits; cognitive deficits; dysphagia with aspiration risk  Home situation: Lives with family  ST Dx: [] Aphasia  [x] Dysarthria  [] Apraxia   [x] Oropharyngeal dysphagia [x] Cognitive Impairment  [] Other:   Initial Speech Therapy Assessment Diagnosis:   1. Cognitive Diagnosis: pt demonstrating mild to marked cognitive -linguistic deficits in domains of sustained/focused attention; left visual attention; insight/PS/VPS for safety ; working memory and STM; reasoning and math language. Pt with visual deficits characterized by poor attention to the left; left body neglect. Pt lethargy noted but pt was able to both physically / verbally actively participate in varied tasks. Flat affect and overall poor sustained visual attention even when right of midline. Problems wtih distractibility/task extinction; poor body awareness; safety awareness. Will initiate therapy and target attention; working memory and safety insight; and visual language function (using visual sscanning strategies for the latter)  2. Speech Diagnosis: Dysarthria features most symptomatic as pt fatigues with increase in phonemic distortions 2/2 to left om weakness which is exacerbated when fatigued. Pt is able to achieve audible/intelligible connected speech  3. Communication Diagnosis: Auditory processing is most optimal with concrete , short questions/statements and simple one step commands. Pt with attention/working memorya nd concern for complex auditory processing deficits. Visual language processing is markedly reduced due to visual inattention deficits and reduced pt insight.  Verbal expression is functional for conveying needs/wants; concrete confrontation naming and

## 2023-10-06 NOTE — PROGRESS NOTES
Patient admitted to rehab with non traumatic intracranial hemorrhage. A/Ox4. Transfers with maxi move. Mobility restrictions: flaccid left side. On soft and bite sized diet, tolerating well. Medications taken whole with pudding. On nothing for DVT prophylaxis. Skin: intact. Oxygen: RA. LDA: none. Has been occasionally incontinent of bowel and occasionally incontinent of bladder. LBM 10/5/23. Chair/bed alarms in use and call light in reach. Will monitor for safety.  Electronically signed by Elena Snellen, RN on 10/6/2023 at 3:54 PM impression;  stable post-op    Plan: as per Dr Zhu

## 2023-10-06 NOTE — PLAN OF CARE
Problem: Discharge Planning  Goal: Discharge to home or other facility with appropriate resources  10/5/2023 2338 by Edwige Tinoco RN  Outcome: Progressing  Flowsheets (Taken 10/5/2023 2338)  Discharge to home or other facility with appropriate resources:   Refer to discharge planning if patient needs post-hospital services based on physician order or complex needs related to functional status, cognitive ability or social support system   Arrange for interpreters to assist at discharge as needed   Identify discharge learning needs (meds, wound care, etc)   Arrange for needed discharge resources and transportation as appropriate   Identify barriers to discharge with patient and caregiver     Problem: Pain  Goal: Verbalizes/displays adequate comfort level or baseline comfort level  10/5/2023 2338 by Edwige Tinoco RN  Outcome: Progressing  Flowsheets (Taken 10/5/2023 2338)  Verbalizes/displays adequate comfort level or baseline comfort level:   Implement non-pharmacological measures as appropriate and evaluate response   Administer analgesics based on type and severity of pain and evaluate response   Assess pain using appropriate pain scale   Encourage patient to monitor pain and request assistance   Consider cultural and social influences on pain and pain management   Notify Licensed Independent Practitioner if interventions unsuccessful or patient reports new pain     Problem: Skin/Tissue Integrity  Goal: Absence of new skin breakdown  Description: 1. Monitor for areas of redness and/or skin breakdown  2. Assess vascular access sites hourly  3. Every 4-6 hours minimum:  Change oxygen saturation probe site  4. Every 4-6 hours:  If on nasal continuous positive airway pressure, respiratory therapy assess nares and determine need for appliance change or resting period. 10/5/2023 2338 by Edwige Tinoco RN  Outcome: Progressing  Note: Skin assessment completed on this shift.  Barrier wipes used in the

## 2023-10-06 NOTE — PROGRESS NOTES
This RN came into room when patient was yelling for help, patient was complaining of chest pain, pt stated that it felt like he was having a panic attack. PRN Xanax and hydralazine given.

## 2023-10-06 NOTE — PROGRESS NOTES
Occupational Therapy  Facility/Department: [facility]  Rehabilitation Occupational Therapy Daily Treatment Note    Date: 10/6/23  Patient Name: Bernard West       Room: N5Q-3118/4866-93  MRN: [id number]  Account: [de-identified]   : 1970  (50 y.o.) Gender: male            PM session: pt seated in high back w/c, bedside table on right. He reported having hallucinations of his mother \"Caridad\"--uncertain if he fell asleep and had a dream vs visual hallucination. Pt taken to activity room to remove distractions for OT/PT co tx. He is agreeable for standing & gait training using berny plus. He required total set up of machine including placement of L UE, L LE and to ensure R foot positioned on floor correctly. He needed heavy min/mod A of 2 for sit<>stand inside berny plus--once standing, a sheet was placed @ lower hips & buttocks for wt shifting, pt tends to collapse & twist to L, used mirror for visual feedback but has poor proprioception & attention to task. Pt able to ambulate 6 ft w/ max A of 3-4--needed step by step instruction for stepping w/ R foot, wt shifting while PT/S advanced his L LE (ace wrapped for dorsiflex assist), pt lacks sensation & has only occasional trace movmts. Pt allows his chest to lay on surface of berny plus, min A to correct. After rest break, he required same amount of assistance to walk an additional 3 ft; fatigue and neglect noted. Pt stood a 3rd time x 3 minutes w/ min/mod A of 2, does not engage core or glutes unless provided w/ tactile cues, does not use mirror unless prompted for visual feedback. Pt returned to w/c w/ maximove pad underneath him, L 1/2 lap tray in place. Tx time: 45 min, cont w/ POC. Julia Lucero, OTR/L #[id number]         Past Medical History:  has a past medical history of CKD (chronic kidney disease), Headache, HLD (hyperlipidemia), Hypertension, Left-sided weakness, NSTEMI (non-ST elevated myocardial infarction) ([address]), and Pancreatitis.   Past Surgical

## 2023-10-06 NOTE — PROGRESS NOTES
insight into deficits, L neglect        Therapy Time   Individual Concurrent Group Co-treatment   Time In       0815   Time Out       0900   Minutes       39           Second Session Therapy Time     Individual Co-treatment   Time In  1230   Time Out  1315   Minutes  39       Murali Strong, JOSE ANGEL, 10/06/23 at 10:08 AM     Therapist was present, directed the patient's care, made skilled judgement, and was responsible for assessment and treatment of the patient.         Shawna Eduardo, [id number]

## 2023-10-06 NOTE — PLAN OF CARE
Problem: Discharge Planning  Goal: Discharge to home or other facility with appropriate resources  Outcome: Progressing  Flowsheets (Taken 10/6/2023 1005)  Discharge to home or other facility with appropriate resources:   Identify barriers to discharge with patient and caregiver   Arrange for needed discharge resources and transportation as appropriate   Identify discharge learning needs (meds, wound care, etc)   Refer to discharge planning if patient needs post-hospital services based on physician order or complex needs related to functional status, cognitive ability or social support system     Problem: Pain  Goal: Verbalizes/displays adequate comfort level or baseline comfort level  Outcome: Progressing  Flowsheets  Taken 10/6/2023 1420  Verbalizes/displays adequate comfort level or baseline comfort level:   Encourage patient to monitor pain and request assistance   Assess pain using appropriate pain scale   Administer analgesics based on type and severity of pain and evaluate response   Implement non-pharmacological measures as appropriate and evaluate response

## 2023-10-06 NOTE — PROGRESS NOTES
Nursing supervisor called and states patient having chest pain per phone call of family, spoke with other nurse did give medication, arm board sticking in chest area, did adjust, patient states feeling better.

## 2023-10-07 PROCEDURE — 6370000000 HC RX 637 (ALT 250 FOR IP): Performed by: PHYSICAL MEDICINE & REHABILITATION

## 2023-10-07 PROCEDURE — 1280000000 HC REHAB R&B

## 2023-10-07 PROCEDURE — 94760 N-INVAS EAR/PLS OXIMETRY 1: CPT

## 2023-10-07 PROCEDURE — 6370000000 HC RX 637 (ALT 250 FOR IP): Performed by: REGISTERED NURSE

## 2023-10-07 RX ADMIN — CARVEDILOL 12.5 MG: 12.5 TABLET, FILM COATED ORAL at 16:42

## 2023-10-07 RX ADMIN — FAMOTIDINE 20 MG: 20 TABLET ORAL at 08:21

## 2023-10-07 RX ADMIN — HYDRALAZINE HYDROCHLORIDE 50 MG: 50 TABLET, FILM COATED ORAL at 13:55

## 2023-10-07 RX ADMIN — HYDRALAZINE HYDROCHLORIDE 50 MG: 50 TABLET, FILM COATED ORAL at 06:00

## 2023-10-07 RX ADMIN — ALPRAZOLAM 0.25 MG: 0.25 TABLET ORAL at 16:36

## 2023-10-07 RX ADMIN — BUTALBITAL, ACETAMINOPHEN AND CAFFEINE 1 TABLET: 325; 50; 40 TABLET ORAL at 01:42

## 2023-10-07 RX ADMIN — AMLODIPINE BESYLATE 10 MG: 10 TABLET ORAL at 08:20

## 2023-10-07 RX ADMIN — NYSTATIN 500000 UNITS: 100000 SUSPENSION ORAL at 21:33

## 2023-10-07 RX ADMIN — FAMOTIDINE 20 MG: 20 TABLET ORAL at 21:33

## 2023-10-07 RX ADMIN — SERTRALINE HYDROCHLORIDE 25 MG: 25 TABLET ORAL at 08:20

## 2023-10-07 RX ADMIN — CARVEDILOL 12.5 MG: 12.5 TABLET, FILM COATED ORAL at 08:21

## 2023-10-07 RX ADMIN — HYDRALAZINE HYDROCHLORIDE 75 MG: 50 TABLET, FILM COATED ORAL at 21:35

## 2023-10-07 RX ADMIN — TRAZODONE HYDROCHLORIDE 50 MG: 50 TABLET ORAL at 21:33

## 2023-10-07 RX ADMIN — NYSTATIN 500000 UNITS: 100000 SUSPENSION ORAL at 13:55

## 2023-10-07 RX ADMIN — ATORVASTATIN CALCIUM 40 MG: 40 TABLET, FILM COATED ORAL at 08:21

## 2023-10-07 RX ADMIN — SENNOSIDES AND DOCUSATE SODIUM 1 TABLET: 50; 8.6 TABLET ORAL at 21:33

## 2023-10-07 RX ADMIN — NYSTATIN 500000 UNITS: 100000 SUSPENSION ORAL at 08:21

## 2023-10-07 RX ADMIN — DICLOFENAC SODIUM 4 G: 10 GEL TOPICAL at 05:00

## 2023-10-07 RX ADMIN — DICLOFENAC SODIUM 4 G: 10 GEL TOPICAL at 18:21

## 2023-10-07 RX ADMIN — BUTALBITAL, ACETAMINOPHEN AND CAFFEINE 1 TABLET: 325; 50; 40 TABLET ORAL at 16:43

## 2023-10-07 RX ADMIN — SENNOSIDES AND DOCUSATE SODIUM 1 TABLET: 50; 8.6 TABLET ORAL at 08:21

## 2023-10-07 RX ADMIN — NYSTATIN 500000 UNITS: 100000 SUSPENSION ORAL at 16:42

## 2023-10-07 ASSESSMENT — PAIN SCALES - WONG BAKER: WONGBAKER_NUMERICALRESPONSE: 0

## 2023-10-07 ASSESSMENT — PAIN SCALES - GENERAL
PAINLEVEL_OUTOF10: 0
PAINLEVEL_OUTOF10: 4
PAINLEVEL_OUTOF10: 0
PAINLEVEL_OUTOF10: 8
PAINLEVEL_OUTOF10: 8

## 2023-10-07 ASSESSMENT — PAIN DESCRIPTION - ORIENTATION: ORIENTATION: RIGHT

## 2023-10-07 ASSESSMENT — PAIN DESCRIPTION - LOCATION
LOCATION: HEAD
LOCATION: HEAD

## 2023-10-07 ASSESSMENT — PAIN DESCRIPTION - DESCRIPTORS
DESCRIPTORS: ACHING
DESCRIPTORS: ACHING

## 2023-10-07 ASSESSMENT — PAIN - FUNCTIONAL ASSESSMENT: PAIN_FUNCTIONAL_ASSESSMENT: ACTIVITIES ARE NOT PREVENTED

## 2023-10-07 NOTE — PROGRESS NOTES
Pt reports having seen \"heads coming out the walls\" where the closet is pt also states \"there were hands coming out of the closet too. \" The \"heads\" were balloons hanging in the pt room. The balloons were removed and pt is now calm with no more evidence of hallucinations.

## 2023-10-07 NOTE — PLAN OF CARE
Problem: Discharge Planning  Goal: Discharge to home or other facility with appropriate resources  10/7/2023 0058 by Rafy Thomas RN  Outcome: Progressing     Problem: Pain  Goal: Verbalizes/displays adequate comfort level or baseline comfort level  10/7/2023 0058 by Rafy Thomas RN  Outcome: Progressing     Problem: Skin/Tissue Integrity  Goal: Absence of new skin breakdown  Description: 1. Monitor for areas of redness and/or skin breakdown  2. Assess vascular access sites hourly  3. Every 4-6 hours minimum:  Change oxygen saturation probe site  4. Every 4-6 hours:  If on nasal continuous positive airway pressure, respiratory therapy assess nares and determine need for appliance change or resting period.   10/7/2023 0058 by Rafy Thomas RN  Outcome: Progressing

## 2023-10-07 NOTE — PROGRESS NOTES
Patient admitted to rehab with nontraumatic intracranial hemorrrhage . A/Ox4. Transfers with maxi move. Mobility restrictions: WBAT. L side flaccid On soft and bite sized diet, tolerating well. Medications taken whole with thins. On none for DVT prophylaxis. Skin: intact. Oxygen: RA. LDA: NONE. Has been continent of bowel and incontinent of bladder. LBM 10/5. Chair/bed alarms in use and call light in reach. Will monitor for safety.

## 2023-10-07 NOTE — PROGRESS NOTES
Pt given xanax after pt states \"one of the heads came back its ankush \"he told me to shoot off your arm, and Mukesh is over there making a list right now. \"

## 2023-10-07 NOTE — PLAN OF CARE
patient/family to identify coping skills, available support systems and cultural and spiritual values  2. Provide emotional support, including active listening and acknowledgement of concerns of patient and caregivers  3. Reduce environmental stimuli, as able  4. Instruct patient/family in relaxation techniques, as appropriate  5. Assess for spiritual pain/suffering and initiate Spiritual Care, Psychosocial Clinical Specialist consults as needed  Outcome: Progressing  Flowsheets (Taken 10/7/2023 5607)  Patient/family able to verbalize anxieties, fears, and concerns, and demonstrate effective coping:   Reduce environmental stimuli, as able   Assist patient/family to identify coping skills, available support systems and cultural and spiritual values   Provide emotional support, including active listening and acknowledgement of concerns of patient and caregivers   Instruct patient/family in relaxation techniques, as appropriate     Problem: Change in Body Image  Goal: Pt/Family communicate acceptance of loss or change in body image and feel psychological comfort and peace  Description: INTERVENTIONS:  1. Assess patient/family anxiety and grief process related to change in body image, loss of functional status, loss of sense of self, and forgiveness  2. Provide emotional and spiritual support  3. Provide information about the patient's health status with consideration of family and cultural values  4. Communicate willingness to discuss loss and facilitate grief process with patient/family as appropriate  5. Emphasize sustaining relationships within family system and community, or ange/spiritual traditions  6.  Initiate Spiritual Care, Psychosocial Clinical Specialist consult as needed  Outcome: Progressing  Flowsheets (Taken 10/7/2023 7693)  Patient/family communicate acceptance of loss or change in body image and feel psychological comfort and peace:   Provide emotional and spiritual support   Emphasize sustaining

## 2023-10-07 NOTE — PROGRESS NOTES
This is a 50 y.o.  male admitted on 9/19/2023 with Nontraumatic intracranial hemorrhage ([address]) [I62.9]. A&O X 4. Active bowel sounds. Last BM 10/5/2023. Patient is incontinent of bladder. Depends on. He on a dysphagia: soft and bite sized diet. Medications taken whole, one at a time with thins. Skin is intact. Transfers with [facility]. HS medication given. Tolerated well. Call light and bedside table within reach. Patient instructed to call if he needs anything.

## 2023-10-08 PROCEDURE — 6370000000 HC RX 637 (ALT 250 FOR IP): Performed by: PHYSICAL MEDICINE & REHABILITATION

## 2023-10-08 PROCEDURE — 94760 N-INVAS EAR/PLS OXIMETRY 1: CPT

## 2023-10-08 PROCEDURE — 1280000000 HC REHAB R&B

## 2023-10-08 RX ADMIN — AMLODIPINE BESYLATE 10 MG: 10 TABLET ORAL at 09:03

## 2023-10-08 RX ADMIN — HYDRALAZINE HYDROCHLORIDE 75 MG: 50 TABLET, FILM COATED ORAL at 21:15

## 2023-10-08 RX ADMIN — HYDRALAZINE HYDROCHLORIDE 75 MG: 50 TABLET, FILM COATED ORAL at 05:12

## 2023-10-08 RX ADMIN — NYSTATIN 500000 UNITS: 100000 SUSPENSION ORAL at 09:03

## 2023-10-08 RX ADMIN — CARVEDILOL 12.5 MG: 12.5 TABLET, FILM COATED ORAL at 09:03

## 2023-10-08 RX ADMIN — HYDRALAZINE HYDROCHLORIDE 75 MG: 50 TABLET, FILM COATED ORAL at 13:13

## 2023-10-08 RX ADMIN — SENNOSIDES AND DOCUSATE SODIUM 1 TABLET: 50; 8.6 TABLET ORAL at 21:15

## 2023-10-08 RX ADMIN — SERTRALINE HYDROCHLORIDE 25 MG: 25 TABLET ORAL at 09:04

## 2023-10-08 RX ADMIN — ATORVASTATIN CALCIUM 40 MG: 40 TABLET, FILM COATED ORAL at 09:03

## 2023-10-08 RX ADMIN — NYSTATIN 500000 UNITS: 100000 SUSPENSION ORAL at 13:13

## 2023-10-08 RX ADMIN — DICLOFENAC SODIUM 4 G: 10 GEL TOPICAL at 04:51

## 2023-10-08 RX ADMIN — FAMOTIDINE 20 MG: 20 TABLET ORAL at 21:15

## 2023-10-08 RX ADMIN — TRAMADOL HYDROCHLORIDE 50 MG: 50 TABLET ORAL at 09:04

## 2023-10-08 RX ADMIN — FAMOTIDINE 20 MG: 20 TABLET ORAL at 09:04

## 2023-10-08 RX ADMIN — NYSTATIN 500000 UNITS: 100000 SUSPENSION ORAL at 16:46

## 2023-10-08 RX ADMIN — DICLOFENAC SODIUM 4 G: 10 GEL TOPICAL at 21:16

## 2023-10-08 RX ADMIN — DICLOFENAC SODIUM 4 G: 10 GEL TOPICAL at 13:13

## 2023-10-08 RX ADMIN — TRAZODONE HYDROCHLORIDE 50 MG: 50 TABLET ORAL at 21:15

## 2023-10-08 RX ADMIN — NYSTATIN 500000 UNITS: 100000 SUSPENSION ORAL at 21:15

## 2023-10-08 RX ADMIN — TRAMADOL HYDROCHLORIDE 50 MG: 50 TABLET ORAL at 21:15

## 2023-10-08 RX ADMIN — CARVEDILOL 12.5 MG: 12.5 TABLET, FILM COATED ORAL at 16:46

## 2023-10-08 ASSESSMENT — PAIN DESCRIPTION - FREQUENCY: FREQUENCY: INTERMITTENT

## 2023-10-08 ASSESSMENT — PAIN DESCRIPTION - ORIENTATION
ORIENTATION: LEFT
ORIENTATION: LEFT
ORIENTATION: RIGHT

## 2023-10-08 ASSESSMENT — PAIN SCALES - GENERAL
PAINLEVEL_OUTOF10: 8
PAINLEVEL_OUTOF10: 7
PAINLEVEL_OUTOF10: 5
PAINLEVEL_OUTOF10: 3

## 2023-10-08 ASSESSMENT — PAIN DESCRIPTION - LOCATION
LOCATION: NECK
LOCATION: CHEST
LOCATION: HEAD;NECK

## 2023-10-08 ASSESSMENT — PAIN - FUNCTIONAL ASSESSMENT: PAIN_FUNCTIONAL_ASSESSMENT: PREVENTS OR INTERFERES SOME ACTIVE ACTIVITIES AND ADLS

## 2023-10-08 ASSESSMENT — PAIN DESCRIPTION - PAIN TYPE: TYPE: ACUTE PAIN

## 2023-10-08 ASSESSMENT — PAIN DESCRIPTION - DESCRIPTORS
DESCRIPTORS: ACHING;THROBBING
DESCRIPTORS: ACHING;THROBBING

## 2023-10-08 ASSESSMENT — PAIN DESCRIPTION - ONSET: ONSET: ON-GOING

## 2023-10-08 NOTE — PROGRESS NOTES
Patient admitted to rehab with nontraumatic intracranial hemorrhage. A/Ox4. Transfers with maxi move. Mobility restrictions: L side flaccid. On soft and bite sized  diet, tolerating well. Medications taken whole with applesauce and thins. On none for DVT prophylaxis. Skin: intact. Oxygen: RA. LDA: NONE. Has been continent of bowel and incontinent of bladder. LBM 10/7. Chair/bed alarms in use and call light in reach. Will monitor for safety.

## 2023-10-08 NOTE — PLAN OF CARE
Problem: Discharge Planning  Goal: Discharge to home or other facility with appropriate resources  10/8/2023 1158 by Iris Medina RN  Outcome: Progressing  Flowsheets (Taken 10/6/2023 1005 by Iris Regalado, RN)  Discharge to home or other facility with appropriate resources:   Identify barriers to discharge with patient and caregiver   Arrange for needed discharge resources and transportation as appropriate   Identify discharge learning needs (meds, wound care, etc)   Refer to discharge planning if patient needs post-hospital services based on physician order or complex needs related to functional status, cognitive ability or social support system  10/8/2023 0030 by Francisco Sandoval RN  Outcome: Progressing     Problem: Pain  Goal: Verbalizes/displays adequate comfort level or baseline comfort level  10/8/2023 1158 by Iris Medina RN  Outcome: Progressing  Flowsheets (Taken 10/6/2023 1420 by Iris Regalado, RN)  Verbalizes/displays adequate comfort level or baseline comfort level:   Encourage patient to monitor pain and request assistance   Assess pain using appropriate pain scale   Administer analgesics based on type and severity of pain and evaluate response   Implement non-pharmacological measures as appropriate and evaluate response  10/8/2023 0030 by Francisco Sandoval RN  Outcome: Progressing     Problem: Skin/Tissue Integrity  Goal: Absence of new skin breakdown  Description: 1. Monitor for areas of redness and/or skin breakdown  2. Assess vascular access sites hourly  3. Every 4-6 hours minimum:  Change oxygen saturation probe site  4. Every 4-6 hours:  If on nasal continuous positive airway pressure, respiratory therapy assess nares and determine need for appliance change or resting period.   10/8/2023 1158 by Iirs Medina RN  Outcome: Progressing  10/8/2023 0030 by Francisco Sandoval RN  Outcome: Progressing     Problem: ABCDS Injury Assessment  Goal: Absence of physical injury  10/8/2023

## 2023-10-08 NOTE — PLAN OF CARE
Problem: Discharge Planning  Goal: Discharge to home or other facility with appropriate resources  Outcome: Progressing     Problem: Pain  Goal: Verbalizes/displays adequate comfort level or baseline comfort level  Outcome: Progressing     Problem: Skin/Tissue Integrity  Goal: Absence of new skin breakdown  Description: 1. Monitor for areas of redness and/or skin breakdown  2. Assess vascular access sites hourly  3. Every 4-6 hours minimum:  Change oxygen saturation probe site  4. Every 4-6 hours:  If on nasal continuous positive airway pressure, respiratory therapy assess nares and determine need for appliance change or resting period.   Outcome: Progressing     Problem: ABCDS Injury Assessment  Goal: Absence of physical injury  Outcome: Progressing     Problem: Neurosensory - Adult  Goal: Achieves stable or improved neurological status  Outcome: Progressing     Problem: Skin/Tissue Integrity - Adult  Goal: Skin integrity remains intact  Outcome: Progressing     Problem: Metabolic/Fluid and Electrolytes - Adult  Goal: Electrolytes maintained within normal limits  Outcome: Progressing

## 2023-10-08 NOTE — PROGRESS NOTES
This is a 50 y.o.  male admitted on 9/19/2023 with Nontraumatic intracranial hemorrhage ([address]) [I62.9]. A&O X 4. No hallucinations at this time. Active bowel sounds. Last BM 10/7/2023. Patient is incontinent of bladder. and bowel. Depends on. He on a dysphagia: soft and bite sized diet. Medications taken whole, one at a time with thins/applesauce. Skin is intact. Transfers with [facility].  medication given. Tolerated well. Call light and bedside table within reach. Patient instructed to call if he needs anything.

## 2023-10-09 LAB
ANION GAP SERPL CALCULATED.3IONS-SCNC: 11 MMOL/L (ref 3–16)
BASOPHILS # BLD: 0.1 K/UL (ref 0–0.2)
BASOPHILS NFR BLD: 0.5 %
BUN SERPL-MCNC: 17 MG/DL (ref 7–20)
CALCIUM SERPL-MCNC: 9.2 MG/DL (ref 8.3–10.6)
CHLORIDE SERPL-SCNC: 103 MMOL/L (ref 99–110)
CO2 SERPL-SCNC: 23 MMOL/L (ref 21–32)
CREAT SERPL-MCNC: 1.4 MG/DL (ref 0.9–1.3)
DEPRECATED RDW RBC AUTO: 12.2 % (ref 12.4–15.4)
EOSINOPHIL # BLD: 0.2 K/UL (ref 0–0.6)
EOSINOPHIL NFR BLD: 1.7 %
GFR SERPLBLD CREATININE-BSD FMLA CKD-EPI: >60 ML/MIN/{1.73_M2}
GLUCOSE SERPL-MCNC: 129 MG/DL (ref 70–99)
HCT VFR BLD AUTO: 41.7 % (ref 40.5–52.5)
HGB BLD-MCNC: 14 G/DL (ref 13.5–17.5)
LYMPHOCYTES # BLD: 2.9 K/UL (ref 1–5.1)
LYMPHOCYTES NFR BLD: 29.6 %
MCH RBC QN AUTO: 32.5 PG (ref 26–34)
MCHC RBC AUTO-ENTMCNC: 33.7 G/DL (ref 31–36)
MCV RBC AUTO: 96.4 FL (ref 80–100)
MONOCYTES # BLD: 0.8 K/UL (ref 0–1.3)
MONOCYTES NFR BLD: 7.8 %
NEUTROPHILS # BLD: 5.8 K/UL (ref 1.7–7.7)
NEUTROPHILS NFR BLD: 60.4 %
PLATELET # BLD AUTO: 238 K/UL (ref 135–450)
PMV BLD AUTO: 7.9 FL (ref 5–10.5)
POTASSIUM SERPL-SCNC: 5.2 MMOL/L (ref 3.5–5.1)
RBC # BLD AUTO: 4.32 M/UL (ref 4.2–5.9)
SODIUM SERPL-SCNC: 137 MMOL/L (ref 136–145)
WBC # BLD AUTO: 9.7 K/UL (ref 4–11)

## 2023-10-09 PROCEDURE — 1280000000 HC REHAB R&B

## 2023-10-09 PROCEDURE — 97129 THER IVNTJ 1ST 15 MIN: CPT

## 2023-10-09 PROCEDURE — 6370000000 HC RX 637 (ALT 250 FOR IP): Performed by: PHYSICAL MEDICINE & REHABILITATION

## 2023-10-09 PROCEDURE — 97130 THER IVNTJ EA ADDL 15 MIN: CPT

## 2023-10-09 PROCEDURE — 36415 COLL VENOUS BLD VENIPUNCTURE: CPT

## 2023-10-09 PROCEDURE — 85025 COMPLETE CBC W/AUTO DIFF WBC: CPT

## 2023-10-09 PROCEDURE — 97530 THERAPEUTIC ACTIVITIES: CPT

## 2023-10-09 PROCEDURE — 80048 BASIC METABOLIC PNL TOTAL CA: CPT

## 2023-10-09 PROCEDURE — 6370000000 HC RX 637 (ALT 250 FOR IP): Performed by: REGISTERED NURSE

## 2023-10-09 PROCEDURE — 92526 ORAL FUNCTION THERAPY: CPT

## 2023-10-09 PROCEDURE — 97112 NEUROMUSCULAR REEDUCATION: CPT

## 2023-10-09 PROCEDURE — 94760 N-INVAS EAR/PLS OXIMETRY 1: CPT

## 2023-10-09 PROCEDURE — 97535 SELF CARE MNGMENT TRAINING: CPT

## 2023-10-09 RX ORDER — HYDRALAZINE HYDROCHLORIDE 50 MG/1
100 TABLET, FILM COATED ORAL EVERY 8 HOURS SCHEDULED
Status: DISCONTINUED | OUTPATIENT
Start: 2023-10-09 | End: 2023-10-12 | Stop reason: HOSPADM

## 2023-10-09 RX ADMIN — DICLOFENAC SODIUM 4 G: 10 GEL TOPICAL at 20:29

## 2023-10-09 RX ADMIN — AMLODIPINE BESYLATE 10 MG: 10 TABLET ORAL at 07:37

## 2023-10-09 RX ADMIN — SENNOSIDES AND DOCUSATE SODIUM 1 TABLET: 50; 8.6 TABLET ORAL at 07:36

## 2023-10-09 RX ADMIN — NYSTATIN 500000 UNITS: 100000 SUSPENSION ORAL at 20:29

## 2023-10-09 RX ADMIN — ATORVASTATIN CALCIUM 40 MG: 40 TABLET, FILM COATED ORAL at 07:37

## 2023-10-09 RX ADMIN — BUTALBITAL, ACETAMINOPHEN AND CAFFEINE 1 TABLET: 325; 50; 40 TABLET ORAL at 12:12

## 2023-10-09 RX ADMIN — HYDRALAZINE HYDROCHLORIDE 100 MG: 50 TABLET, FILM COATED ORAL at 14:32

## 2023-10-09 RX ADMIN — NYSTATIN 500000 UNITS: 100000 SUSPENSION ORAL at 07:37

## 2023-10-09 RX ADMIN — CARVEDILOL 12.5 MG: 12.5 TABLET, FILM COATED ORAL at 07:37

## 2023-10-09 RX ADMIN — FAMOTIDINE 20 MG: 20 TABLET ORAL at 20:29

## 2023-10-09 RX ADMIN — NYSTATIN 500000 UNITS: 100000 SUSPENSION ORAL at 14:32

## 2023-10-09 RX ADMIN — TRAZODONE HYDROCHLORIDE 50 MG: 50 TABLET ORAL at 20:28

## 2023-10-09 RX ADMIN — CARVEDILOL 12.5 MG: 12.5 TABLET, FILM COATED ORAL at 16:30

## 2023-10-09 RX ADMIN — FAMOTIDINE 20 MG: 20 TABLET ORAL at 07:37

## 2023-10-09 RX ADMIN — NYSTATIN 500000 UNITS: 100000 SUSPENSION ORAL at 16:30

## 2023-10-09 RX ADMIN — SERTRALINE HYDROCHLORIDE 25 MG: 25 TABLET ORAL at 07:37

## 2023-10-09 RX ADMIN — HYDRALAZINE HYDROCHLORIDE 75 MG: 50 TABLET, FILM COATED ORAL at 07:04

## 2023-10-09 RX ADMIN — SENNOSIDES AND DOCUSATE SODIUM 1 TABLET: 50; 8.6 TABLET ORAL at 20:28

## 2023-10-09 RX ADMIN — TRAMADOL HYDROCHLORIDE 50 MG: 50 TABLET ORAL at 20:29

## 2023-10-09 RX ADMIN — DICLOFENAC SODIUM 4 G: 10 GEL TOPICAL at 10:48

## 2023-10-09 RX ADMIN — ALPRAZOLAM 0.25 MG: 0.25 TABLET ORAL at 10:47

## 2023-10-09 RX ADMIN — HYDRALAZINE HYDROCHLORIDE 100 MG: 50 TABLET, FILM COATED ORAL at 20:28

## 2023-10-09 ASSESSMENT — PAIN SCALES - GENERAL
PAINLEVEL_OUTOF10: 0
PAINLEVEL_OUTOF10: 7
PAINLEVEL_OUTOF10: 4
PAINLEVEL_OUTOF10: 0
PAINLEVEL_OUTOF10: 0
PAINLEVEL_OUTOF10: 3
PAINLEVEL_OUTOF10: 1
PAINLEVEL_OUTOF10: 7
PAINLEVEL_OUTOF10: 5

## 2023-10-09 ASSESSMENT — PAIN DESCRIPTION - LOCATION
LOCATION: EYE;HEAD
LOCATION: NECK
LOCATION: OTHER (COMMENT)

## 2023-10-09 ASSESSMENT — PAIN DESCRIPTION - ONSET: ONSET: ON-GOING

## 2023-10-09 ASSESSMENT — PAIN DESCRIPTION - ORIENTATION
ORIENTATION: LEFT;INNER
ORIENTATION: LEFT
ORIENTATION: RIGHT;INNER

## 2023-10-09 ASSESSMENT — PAIN DESCRIPTION - FREQUENCY: FREQUENCY: INTERMITTENT

## 2023-10-09 ASSESSMENT — PAIN DESCRIPTION - PAIN TYPE: TYPE: ACUTE PAIN

## 2023-10-09 ASSESSMENT — PAIN DESCRIPTION - DESCRIPTORS
DESCRIPTORS: SHOOTING
DESCRIPTORS: DISCOMFORT
DESCRIPTORS: ACHING

## 2023-10-09 NOTE — PLAN OF CARE
reassurance     Problem: Spiritual Care  Goal: Pt/Family able to move forward in process of forgiving self, others, and/or higher power  Description: INTERVENTIONS:  1. Assist patient/family to overcome blocks to healing by use of spiritual practices (prayer, meditation, guided imagery, reiki, breath work, etc). 2. De-myth guilt and help patient/family identify possible irrational spiritual/cultural beliefs and values. 3. Explore possibilities of making amends & reconciliation with self, others, and/or a greater power. 4. Guide patient/family in identifying painful feelings of guilt. 5. Help patient/famiy explore and identify spiritual beliefs, cultural understandings or values that may help or hinder letting go of issue. 6. Help patient/family explore feelings of anger, bitterness, resentment. 7. Help patient/family identify and examine the situation in which these feelings are experienced. 8. Help patient/family identify destructive displacement of feelings onto other individuals. 9. Invite use of sacraments/rituals/ceremonies as appropriate (e.g. - confession, anointing, smudging). 10. Refer patient/family to formal counseling and/or to ange community for further support work.   10/9/2023 0327 by Rian Vargas RN  Outcome: Progressing

## 2023-10-09 NOTE — PLAN OF CARE
Problem: Pain  Goal: Verbalizes/displays adequate comfort level or baseline comfort level  Note: Pt able to express presence/absence of pain and rate pain appropriately using numerical scale. Pain/discomfort being managed with PRN analgesics per MD orders (see MAR). Pain assessed every shift and after interventions. Continues to have frequent headaches, patient rated 8 to 9 overnight. Describes as throbbing and achy. Medicated with prn Oxycodone 10 mg with relief to a 5.

## 2023-10-09 NOTE — PATIENT CARE CONFERENCE
[facility]  Inpatient Rehabilitation  Weekly Team Conference Note      Date: 10/10/2023  Patient Name:  Rory Caruso    MRN: [id number]  : 1970  Gender: Male  Physician: Dr. Marylu Dela Cruz MD  Diagnosis: Nontraumatic intracranial hemorrhage ([address]) [I62.9]    CASE MANAGEMENT  Assessment: Goal is SNF. Pt and family in agreement; reviewing lists. PHYSICAL THERAPY    Bed Mobility:  Overall Assistance Level: Moderate Assistance, Requires x 2 Assistance  Additional Factors: Verbal cues, Set-up, With handrails, Increased time to complete, Head of bed raised  Sit>supine:  Assistance Level: Requires x 2 assistance, Maximum assistance  Skilled Clinical Factors: attempted to give directions to go onto his right side and assist left LE up into the bed with is right leg, but he was unable to motor plan this motion in it's entirity. He went down onto right elbow and lifted bilateral LE up, but could not swing the legs into the bed and could not roll back onto his back on his own. Supine>sit:  Assistance Level: Moderate assistance, Requires x 2 assistance  Skilled Clinical Factors: Patient able to bring LE off bed with verbal cues by hooking RLE under LLE. Required assistance for trunk but able to get fully upright. Transfers:  Surface: From bed, Bedside commode  Additional Factors: Set-up, Verbal cues, Hand placement cues, Increased time to complete  Device: Lift equipment  Sit>stand:  Assistance Level: Dependent, Requires x 2 assistance  Skilled Clinical Factors: Patient with improved ability to follow simple commands for . Patient demonstrated improved head and trunk control once upright.  Required min Assist to manage LUE and hips during transfers  Stand>sit:  Assistance Level: Dependent, Requires x 2 assistance  Skilled Clinical Factors: with use of berny plus, the patient requires support at left UE to ensure safe left shoulder position as he returns to

## 2023-10-09 NOTE — PROGRESS NOTES
toward pt goal   1. Upgraded goal The patient will tolerate dysphagia soft /bite size food consistencies with thin liquid consistent diet without observed clinical signs of aspiration, Direct Dysphagia treatment: Thin liquids: goal met with strategies PRN      Mixed consistency: (cold cereal): goal met with compensatory strategies PRN     Ongoing pt/family ed in progress in oropharygnela phase of the swalow and current diet consistencies and monitor/strategies for left oral pocketing monitor /clearance swallow to clear   The patient will improve oral preparation phase via bolus control/manipulation exercises to 5/5 each trial.,  Left oral motor rom/strengthening and coordination ex targeted:   Ongoign review of warm up ex for left labial/buccal muscular engagement for po trials and when self feeding tsp/cup/straw labial seal and labial seal during masticaiton    Targeted for:  Bolus control strategies for mixed food/liquid consistencies  Bolus control for thin liquids  Bolus formation/cohesion:  Paced activity which assists. Clearance swallow  Oral care     Pt meeting goal with compensatory strategies PRN    The patient/caregiver will demonstrate understanding of compensatory strategies for improved swallowing safety.   Max set up review    Reinforced upright midline head/trunk position   Reduced rate of intake:intermittent reminders  Compensatory strategies for mixed food/liquid as in cereal (ie straining tsp of excess liquids): pt demonstrating  Check for and clear oral pocketing between bites/sips  Oral care via use of toothette between meals  Mirror utilized for sustained midline position   Ongoing pt/family ed in compensatory swallow strategies regarding positioning for meals; compensatory swallows strategies and om ex targeting left labial/buccal muscular engagement during rom ex, and strengthening ex; oral care    Left visual attention          Pt will demonstrate improved swallow response via

## 2023-10-09 NOTE — PROGRESS NOTES
Patient admitted to rehab with intracranial hemorrhage. A/Ox4. Transfers with berny plus x 2 or maximove. Patient was not out of bed overnight. Mobility restrictions: Left sided movement improving, patient sneezed and left leg lifted 90 degrees at hip spontaneously, left arm remains with minimal movement. Left sided neglect to body and with vision persists. On dysphagia, soft, and bite size diet, tolerating fairly well. Medications taken whole one at a time with applesauce, staff must watch for pocketing of medications and food. Skin: intact. Oxygen: RA. LDA: None at this time. Has been incontinent of bladder overnight and is dependent on staff for personal care (2 assist). LB 10/7. Chair/bed alarms in use and call light in reach. Will monitor for safety. Patient with continued complaints of headache, pain to right temple, and neck pain. Medicated with prn Oxycodone 10mg per request. Patient states pain relief to a five. Patient needs encouragement to assist with care and movements in bed. Fall precautions remain in place. Will continue to monitor and assist as needed.

## 2023-10-09 NOTE — PROGRESS NOTES
Patient admitted to rehab with nontraumatic intraccranial hemmorrhage . A/Ox4. Transfers with [facility]. Mobility restrictions: L side flaccid. On soft and bite sized diet, tolerating well. Medications taken whole with applesauce/thins. On none due to risk for bleeding for DVT prophylaxis. Skin: intact. Oxygen: RA. LDA: NONE. Has been continent of bowel and incontinent of bladder. LBM 10/7. Chair/bed alarms in use and call light in reach. Will monitor for safety.

## 2023-10-09 NOTE — PROGRESS NOTES
needed mod A of 1-2 for supine to sit. He required min A of 2-3 using berny plus from w/c into bathrm & to sit on BSC in shower. Pt required mod/max A for bathing tasks, decreased initiation & thoroughness, required step by step instruction, PT/S on L side to assist w/ positioning of L UE & trunk during tasks. Pt needed max A of 1-2 to don shirt, brief & pants, used berny plus for standing w/ A of 2-3, flexed posture,assist needed to support  L side. Pt placed in wc/ maximove pad underneath him. Unable to complete oral care d/t time constraints. Recommend continued OT & PT service @rehab x 1 more wks to decreased caregiver burden w/ ADLs, he currently needs 24 hr care, family pursuing SNF, DC date Th 10/12  Activity Tolerance: Patient limited by fatigue;Treatment limited secondary to decreased cognition;Treatment limited secondary to medical complications  Discharge Recommendations: Continue to assess pending progress; Patient would benefit from continued therapy after discharge;24 hour supervision or assist  Factors Affecting Discharge: L side flaccid, neglect, poor balance/proprioception/motor planning  OT Equipment Recommendations  Other: TBD depending on DC disposition, currently using berny plus, high back w/c w/ L 1/2 lap tray  Safety Devices  Safety Devices in place: Yes  Type of devices: Gait belt    Patient Education  Education  Education Given To: Patient  Education Provided: ADL Function;Equipment  Education Provided Comments: reviewed use of LH sponge for LB bathing, does not recall amando techniqued  Education Method: Demonstration;Verbal  Barriers to Learning: Cognition;Vision  Education Outcome: Continued education needed; Unable to demonstrate understanding  Skilled Clinical Factors: poor motor planning, proprioception,  L neglect, decreased attention    Plan  Occupational Therapy Plan  Times Per Week: 5-6  Times Per Day: Twice a day  Hours Per Day: 1.5 hours  Therapy Duration: 3 Weeks  Specific

## 2023-10-09 NOTE — PROGRESS NOTES
[facility]   Time In  1230   Time Out  [id number]   Minutes  39         JOSE ANGEL Spaulding, 10/09/23 at 11:57 AM       Therapist was present, directed the patient's care, made skilled judgement, and was responsible for assessment and treatment of the patient.       Estrella Hauser, [id number]

## 2023-10-09 NOTE — PROGRESS NOTES
Nutrition Assessment     Type and Reason for Visit: Reassess    Nutrition Recommendations/Plan:   Continue current diet. Continue magic cup BID. Malnutrition Assessment:  Malnutrition Status: At risk for malnutrition (Comment)    Nutrition Assessment:  FU - Pt. continues on soft and bite sized diet. Meal intakes appear adquate. Magic cup BID in place. Supplement acceptance TBD. Pt. prepares for discharge, possibly to a nursing facility this week. Will monitor for discharge needs. Estimated Daily Nutrient Needs:  Energy (kcal):  1860-2046kcal (20-22kcal/kg) Weight Used for Energy Requirements: Usual     Protein (g):  93-112g (1-1.2g/kg) Weight Used for Protein Requirements: Usual        Fluid (ml/day): Or per provider. Method Used for Fluid Requirements: 1 ml/kcal    Nutrition Related Findings:   K 5.2, Cr. 1.4. LBM 10/7. Wound Type: None    Current Nutrition Therapies:    ADULT ORAL NUTRITION SUPPLEMENT; Lunch, Dinner; Frozen Oral Supplement  ADULT DIET;  Dysphagia - Soft and Bite Sized    Anthropometric Measures:  Height: 6' (182.9 cm)  Current Body Wt: 180 lb 12.4 oz (82 kg)   BMI: 24.5    Nutrition Diagnosis:   Increased nutrient needs related to early satiety as evidenced by intake 0-25%, intake 26-50%    Nutrition Interventions:   Food and/or Nutrient Delivery: Continue Current Diet, Continue Oral Nutrition Supplement  Nutrition Education/Counseling: Education not indicated  Coordination of Nutrition Care: Continue to monitor while inpatient       Goals:  Previous Goal Met: Progressing toward Goal(s)  Goals: Meet at least 75% of estimated needs, by next RD assessment       Nutrition Monitoring and Evaluation:   Behavioral-Environmental Outcomes: None Identified  Food/Nutrient Intake Outcomes: Food and Nutrient Intake  Physical Signs/Symptoms Outcomes: Biochemical Data, Meal Time Behavior    Discharge Planning:    No discharge needs at this time     Didier Toledo RD  Contact: [phone]

## 2023-10-10 PROCEDURE — 1280000000 HC REHAB R&B

## 2023-10-10 PROCEDURE — 97535 SELF CARE MNGMENT TRAINING: CPT

## 2023-10-10 PROCEDURE — 97129 THER IVNTJ 1ST 15 MIN: CPT

## 2023-10-10 PROCEDURE — 97110 THERAPEUTIC EXERCISES: CPT

## 2023-10-10 PROCEDURE — 6370000000 HC RX 637 (ALT 250 FOR IP): Performed by: PHYSICAL MEDICINE & REHABILITATION

## 2023-10-10 PROCEDURE — 97530 THERAPEUTIC ACTIVITIES: CPT

## 2023-10-10 PROCEDURE — 97130 THER IVNTJ EA ADDL 15 MIN: CPT

## 2023-10-10 PROCEDURE — 97542 WHEELCHAIR MNGMENT TRAINING: CPT

## 2023-10-10 PROCEDURE — 97112 NEUROMUSCULAR REEDUCATION: CPT

## 2023-10-10 PROCEDURE — 94760 N-INVAS EAR/PLS OXIMETRY 1: CPT

## 2023-10-10 RX ORDER — GABAPENTIN 100 MG/1
100 CAPSULE ORAL 3 TIMES DAILY
Status: DISCONTINUED | OUTPATIENT
Start: 2023-10-10 | End: 2023-10-11

## 2023-10-10 RX ORDER — CLONIDINE HYDROCHLORIDE 0.1 MG/1
0.1 TABLET ORAL 3 TIMES DAILY
Status: DISCONTINUED | OUTPATIENT
Start: 2023-10-10 | End: 2023-10-12 | Stop reason: HOSPADM

## 2023-10-10 RX ORDER — CLONIDINE HYDROCHLORIDE 0.1 MG/1
0.1 TABLET ORAL EVERY 4 HOURS PRN
Status: DISCONTINUED | OUTPATIENT
Start: 2023-10-10 | End: 2023-10-12 | Stop reason: HOSPADM

## 2023-10-10 RX ADMIN — CARVEDILOL 12.5 MG: 12.5 TABLET, FILM COATED ORAL at 08:14

## 2023-10-10 RX ADMIN — GABAPENTIN 100 MG: 100 CAPSULE ORAL at 10:39

## 2023-10-10 RX ADMIN — CLONIDINE HYDROCHLORIDE 0.1 MG: 0.1 TABLET ORAL at 14:34

## 2023-10-10 RX ADMIN — SENNOSIDES AND DOCUSATE SODIUM 1 TABLET: 50; 8.6 TABLET ORAL at 08:14

## 2023-10-10 RX ADMIN — GABAPENTIN 100 MG: 100 CAPSULE ORAL at 21:15

## 2023-10-10 RX ADMIN — CARVEDILOL 12.5 MG: 12.5 TABLET, FILM COATED ORAL at 18:14

## 2023-10-10 RX ADMIN — HYDRALAZINE HYDROCHLORIDE 100 MG: 50 TABLET, FILM COATED ORAL at 05:39

## 2023-10-10 RX ADMIN — NYSTATIN 500000 UNITS: 100000 SUSPENSION ORAL at 14:34

## 2023-10-10 RX ADMIN — TIZANIDINE 2 MG: 4 TABLET ORAL at 22:01

## 2023-10-10 RX ADMIN — CLONIDINE HYDROCHLORIDE 0.1 MG: 0.1 TABLET ORAL at 10:39

## 2023-10-10 RX ADMIN — HYDRALAZINE HYDROCHLORIDE 100 MG: 50 TABLET, FILM COATED ORAL at 14:35

## 2023-10-10 RX ADMIN — GABAPENTIN 100 MG: 100 CAPSULE ORAL at 14:34

## 2023-10-10 RX ADMIN — NYSTATIN 500000 UNITS: 100000 SUSPENSION ORAL at 21:16

## 2023-10-10 RX ADMIN — NYSTATIN 500000 UNITS: 100000 SUSPENSION ORAL at 10:40

## 2023-10-10 RX ADMIN — FAMOTIDINE 20 MG: 20 TABLET ORAL at 08:15

## 2023-10-10 RX ADMIN — SERTRALINE HYDROCHLORIDE 25 MG: 25 TABLET ORAL at 08:14

## 2023-10-10 RX ADMIN — FAMOTIDINE 20 MG: 20 TABLET ORAL at 21:15

## 2023-10-10 RX ADMIN — SENNOSIDES AND DOCUSATE SODIUM 1 TABLET: 50; 8.6 TABLET ORAL at 21:15

## 2023-10-10 RX ADMIN — DICLOFENAC SODIUM 4 G: 10 GEL TOPICAL at 08:32

## 2023-10-10 RX ADMIN — TRAZODONE HYDROCHLORIDE 50 MG: 50 TABLET ORAL at 22:02

## 2023-10-10 RX ADMIN — CLONIDINE HYDROCHLORIDE 0.1 MG: 0.1 TABLET ORAL at 21:52

## 2023-10-10 RX ADMIN — ATORVASTATIN CALCIUM 40 MG: 40 TABLET, FILM COATED ORAL at 08:15

## 2023-10-10 RX ADMIN — HYDRALAZINE HYDROCHLORIDE 100 MG: 50 TABLET, FILM COATED ORAL at 21:51

## 2023-10-10 RX ADMIN — AMLODIPINE BESYLATE 10 MG: 10 TABLET ORAL at 08:15

## 2023-10-10 ASSESSMENT — PAIN SCALES - WONG BAKER
WONGBAKER_NUMERICALRESPONSE: 0
WONGBAKER_NUMERICALRESPONSE: 2
WONGBAKER_NUMERICALRESPONSE: 2
WONGBAKER_NUMERICALRESPONSE: 0

## 2023-10-10 ASSESSMENT — PAIN - FUNCTIONAL ASSESSMENT
PAIN_FUNCTIONAL_ASSESSMENT: ACTIVITIES ARE NOT PREVENTED
PAIN_FUNCTIONAL_ASSESSMENT: PREVENTS OR INTERFERES WITH ALL ACTIVE AND SOME PASSIVE ACTIVITIES

## 2023-10-10 ASSESSMENT — PAIN DESCRIPTION - DESCRIPTORS
DESCRIPTORS: ACHING;SHARP
DESCRIPTORS: ACHING

## 2023-10-10 ASSESSMENT — PAIN SCALES - GENERAL
PAINLEVEL_OUTOF10: 0
PAINLEVEL_OUTOF10: 4
PAINLEVEL_OUTOF10: 7
PAINLEVEL_OUTOF10: 5

## 2023-10-10 ASSESSMENT — PAIN DESCRIPTION - LOCATION
LOCATION: LEG
LOCATION: CHEST

## 2023-10-10 ASSESSMENT — PAIN DESCRIPTION - DIRECTION: RADIATING_TOWARDS: L LEG

## 2023-10-10 ASSESSMENT — PAIN DESCRIPTION - ORIENTATION
ORIENTATION: RIGHT
ORIENTATION: RIGHT;LEFT

## 2023-10-10 ASSESSMENT — PAIN DESCRIPTION - ONSET: ONSET: ON-GOING

## 2023-10-10 ASSESSMENT — PAIN DESCRIPTION - FREQUENCY: FREQUENCY: CONTINUOUS

## 2023-10-10 ASSESSMENT — PAIN DESCRIPTION - PAIN TYPE: TYPE: ACUTE PAIN

## 2023-10-10 NOTE — PROGRESS NOTES
Patient admitted to rehab with nontraumatic intracranial hemorrhage. A/Ox4. Transfers with [facility]. Mobility restrictions: Flaccid L side. On regular, soft bite size diet, tolerating well. Medications taken whole in apple sauce. On nothing for DVT prophylaxis. Skin: intact. Oxygen: RA. LDA: none. Has been incontinent of bowel and incontinent,continent at times of bladder. LBM 10/1. Chair/bed alarms in use and call light in reach. Will monitor for safety.  Electronically signed by Emy Ashfodr RN on 10/10/2023 at 11:05 AM

## 2023-10-10 NOTE — PLAN OF CARE
Problem: Pain  Goal: Verbalizes/displays adequate comfort level or baseline comfort level  Note: Pt able to express presence/absence of pain and rate pain appropriately using numerical scale. Pain/discomfort being managed with PRN analgesics per MD orders (see MAR). Pain assessed every shift and after interventions. Patient with complaints of neck pain, rated 7 on 1-10 pain scale.  Applied voltaren gel, repositioned, and medicated with prn Ultram.

## 2023-10-10 NOTE — PROGRESS NOTES
Occupational Therapy  Facility/Department: [facility]  Rehabilitation Occupational Therapy Daily Treatment Note    Date: 10/10/23  Patient Name: Yair Seo       Room: U7J-0038/3044-98  MRN: [id number]  Account: [de-identified]   : 1970  (50 y.o.) Gender: male         Past Medical History:  has a past medical history of CKD (chronic kidney disease), Headache, HLD (hyperlipidemia), Hypertension, Left-sided weakness, NSTEMI (non-ST elevated myocardial infarction) ([address]), and Pancreatitis. Past Surgical History:   has a past surgical history that includes Appendectomy. Restrictions  Restrictions/Precautions: Fall Risk, Aspiration Risk  Other position/activity restrictions: soft & bite sized diet w/ thin liquids, total feed, signficant L neglect and L amando body is flaccid  Equipment Used: Bed, Wheelchair    Subjective  Subjective: Pt met in dept. Pt c/o \"tingling\", pain, LUE, 8/10. Pt tearful during tx, d/t pain. Restrictions/Precautions: Fall Risk;Aspiration Risk             Objective     Cognition  Overall Cognitive Status: Exceptions  Following Commands:  Follows one step commands with repetition  Attention Span: Attends with cues to redirect  Memory: Decreased short term memory  Safety Judgement: Decreased awareness of need for assistance;Decreased awareness of need for safety  Problem Solving: Assistance required to generate solutions;Assistance required to identify errors made  Insights: Not aware of deficits  Initiation: Requires cues for some  Sequencing: Requires cues for some  Cognition Comment: more alert but significant L neglect and poor proprioception noted, delayed processing, poor attention, no carryover from session to session  Orientation  Overall Orientation Status: Within Functional Limits  Orientation Level: Oriented X4   Perception  Overall Perceptual Status: Impaired  Unilateral Attention: Cues to attend left visual field;Cues to attend to left side of body;Cues to maintain

## 2023-10-10 NOTE — PROGRESS NOTES
activity: increase external cues for midline versus lean left  Left visual attention for left of midline when completing table top task: moderate to max verbal cues and >mild visual/tactile cues  Visual language processing: (10 line and alternating attention for stimuli left of midline of table top): Mild  to max cues for left of midline   Goal 2: Pt will demonstrate improved focused attention for rule application for >5 task trials without redirect to task instruction /rule application and/or sustain one step command rule application for 30 seconds wtih max set up and max verbal cues Topic maintenance for >5exchanges:   O to minimal re-direct.     Left body scheme for 5 one step commands :   Mild to max cues (verbal/physical)  TAsk attention: for rule application for memory card game: mild to moderate  for sustained attention for turn taking      Left visual attention for speaker on the left: mild to max cues for consistent and for sustained visual attention    Attention and working memory for 4 pc information with interference: 75%    Continued  Attention and self monitor when distracted or makes off task/topic comment: feedback provided   Task set up; target for focused attention; max feedback when off task/topic    [employer] memory for :  Task rule application:   pt with recall of general idea but reminders throughout session despite written reminders for details   learned strategies as in left to right visual scanning: increase cues this pm  Recall of 4 pc information with interference:0 to 25%             Goal 3: Pt will demonstrate improved insight for  following right/left spatial one step command >90% with max cues     Ongoing ed/verbal cues  warranted for initiation of PS during table top/visual language tasks              VPS/PS incorporating rule application   External cues             Goal 4: Pt will maintain audible/intelligible connected speech with >90% articulatory precision with use of compensatory

## 2023-10-10 NOTE — PLAN OF CARE
Problem: Discharge Planning  Goal: Discharge to home or other facility with appropriate resources  10/10/2023 1101 by Justin Blanchard RN  Outcome: Progressing     Problem: Pain  Goal: Verbalizes/displays adequate comfort level or baseline comfort level  10/10/2023 1101 by Justin Blanchard RN  Outcome: Progressing     Problem: Skin/Tissue Integrity  Goal: Absence of new skin breakdown  Description: 1. Monitor for areas of redness and/or skin breakdown  2. Assess vascular access sites hourly  3. Every 4-6 hours minimum:  Change oxygen saturation probe site  4. Every 4-6 hours:  If on nasal continuous positive airway pressure, respiratory therapy assess nares and determine need for appliance change or resting period.   10/10/2023 1101 by Justin Blanchard RN  Outcome: Progressing     Problem: ABCDS Injury Assessment  Goal: Absence of physical injury  10/10/2023 1101 by Justin Blanchard RN  Outcome: Progressing     Problem: Neurosensory - Adult  Goal: Achieves stable or improved neurological status  10/10/2023 1101 by Justin Blanchard RN  Outcome: Progressing     Problem: Neurosensory - Adult  Goal: Achieves maximal functionality and self care  10/10/2023 1101 by Justin Blanchard RN  Outcome: Progressing     Problem: Neurosensory - Adult  Goal: Absence of seizures  10/10/2023 1101 by Justin Blanchard RN  Outcome: Progressing

## 2023-10-10 NOTE — CARE COORDINATION
[facility] Has Accepted this referral.  They are checking his insurance now. Cheryl Narvaez, will be coming to the hospital today to speak with the patient.   [place] Lewis, [facility]     Case Management   [phone]    10/10/2023  11:58 AM    Updated samuel Wright [phone] per pt request.  GENESIS Sharp     Case Management   [phone]    10/10/2023  12:00 PM

## 2023-10-10 NOTE — CARE COORDINATION
Met with patient to discuss DC to SNF. He is still interested in this. He states his niece and dgtr are working on this. He would like a referral to [facility], [facility]. Referral initiated.   [place], [place]     Case Management   [phone]    10/10/2023  10:42 AM

## 2023-10-10 NOTE — PROGRESS NOTES
Physical Therapy  PHYSICAL THERAPY  Progress Note   Second Session    Patient Name: Delice Carrel  Medical Record Number: [id number]    Treatment Diagnosis: CVA          Restrictions/Precautions: Fall Risk, Aspiration Risk Other position/activity restrictions: soft & bite sized diet w/ thin liquids, total feed, signficant L neglect and L amando body is flaccid   Additional Pertinent Hx: per Dr. Sandy Sim - Patient is a 51 yo M with pmh NSTEMI (2020) RBBB, HTN, HLD, CKD III, and chronic pancreatitis who initially presented to  on 9/14/2023 with acute onset left side weakness. He had been complaining of headache throughout the day prior to this. CT head revealed acute basal ganglia and right frontal lobe intraparenchymal hemorrhage w/ significant mass effect on the lateral ventricles R>L and leftward midline shift. CTA negative for vascular malformation. Systolic blood pressure was reportedly in the 250s on arrival. He received 500ml hypertonic saline, Keppra load, and was started on nicardipine gtt prior to being admitted to NSICU. He was started on PO HTN meds and weaned off nicardipine gttt on 9/16. Repeat [facility] 9/16 was stable. Course was complicated by dysphagia, difficult to control HTN. Now presents to ARU with impaired mobility, self-care, and cognition below his baseline. Currently, patient reports intermittent headache. He has severe left side weakness and decreased sensation left side. He has slurred speech. He has intermittent blurred vision. He is motivated to start inpatient rehabilitation program.        Subjective: Patient supine in hospital bed upon entry into patients room. Patient unaware of tray and stated he did not eat lunch. Reported shooting-like pain in both LUE and LLE with observed facial grimacing. Patients family present for second half of therapy session to provide words of encouragement. Objective: Performed supine to sit with Mod assist x 1.  Patient able to hook RLE under LLE and

## 2023-10-10 NOTE — PROGRESS NOTES
Patient admitted to rehab with intracranial hemorrhage. A/Ox4. Transfers with berny plus x 2 or maximove. Patient was not out of bed overnight. Mobility restrictions: Left sided movement improving slowly. Left sided neglect to body and with vision persists. On dysphagia, soft, and bite size diet, tolerating fairly well. Medications taken whole one at a time with applesauce, tolerating well. Skin: intact. Oxygen: RA. LDA: None at this time. Has been incontinent of bladder overnight and is dependent on staff for personal care (2 assist). LBM 10/7. Chair/bed alarms in use and call light in reach. Will monitor for safety. Patient with complaints of neck pain rated 7 on 1-10 pain scale. Medicated with prn Oxycodone per request, Voltaren gel applied, and repositioned. Patient needs encouragement to assist with care and movements in bed. Fall precautions remain in place. Will continue to monitor and assist as needed. Patient is cooperative with care.

## 2023-10-10 NOTE — PROGRESS NOTES
Distance: 80'  Skilled Clinical Factors: Patient continue with improved attention to task but still needs intermittent cueing to stay on task. Patient able to better coordinate RLE and RUE to propel wheelchair. Patient unaware of path deviation and required assistance to correct at times. Demonstrated decreased time to complete longer distance and took 3 minutes to complete. He was able to traverse the transition strip, but needed assistance to maintain straight path. Neuromuscular Education  Facilitation techniques: Used vibration on left LE to encourage muscle recruitment. No muscle firing was noted, but patient was able to sense vibration throughout his left LE.             ASSESSMENT/PROGRESS TOWARDS GOALS       Assessment  Assessment: Mr. Joshi Plants" Richard\" Jenny Medina continues to be easily distracted by environment and his own thoughts. He is redirected more easily at this time, but not consistently. Patient was able to sit at EOB unsupported for approx 6 seconds before having LOB. Patient continues to improve with wheelchair mobility, but does require min assist to maintain straight path. Patient was able to sense light touch in limited areas of left LE which is much improved. Patient is well below baseline and will benefit from continued therapy to improve tolerance to activity and functional mobility prior to discharge. Activity Tolerance: Patient limited by fatigue;Treatment limited secondary to decreased cognition;Treatment limited secondary to medical complications  Discharge Recommendations: Continue to assess pending progress; Patient would benefit from continued therapy after discharge; Therapy recommended at discharge  PT Equipment Recommendations  Other: continue to assess pending patient progress    Goals  Patient Goals   Patient Goals : \" get back to skating\"  Short Term Goals  Time Frame for Short Term Goals: 2 weeks- not met 10/9/23  Short Term Goal 1: Perform bed mobility with max x 1

## 2023-10-11 PROBLEM — R44.3 HALLUCINATIONS: Status: RESOLVED | Noted: 2023-09-30 | Resolved: 2023-10-11

## 2023-10-11 LAB
ANION GAP SERPL CALCULATED.3IONS-SCNC: 8 MMOL/L (ref 3–16)
BASOPHILS # BLD: 0 K/UL (ref 0–0.2)
BASOPHILS NFR BLD: 0.4 %
BUN SERPL-MCNC: 19 MG/DL (ref 7–20)
CALCIUM SERPL-MCNC: 9.5 MG/DL (ref 8.3–10.6)
CHLORIDE SERPL-SCNC: 102 MMOL/L (ref 99–110)
CO2 SERPL-SCNC: 26 MMOL/L (ref 21–32)
CREAT SERPL-MCNC: 1.3 MG/DL (ref 0.9–1.3)
DEPRECATED RDW RBC AUTO: 12.3 % (ref 12.4–15.4)
EOSINOPHIL # BLD: 0.1 K/UL (ref 0–0.6)
EOSINOPHIL NFR BLD: 1.5 %
GFR SERPLBLD CREATININE-BSD FMLA CKD-EPI: >60 ML/MIN/{1.73_M2}
GLUCOSE SERPL-MCNC: 106 MG/DL (ref 70–99)
HCT VFR BLD AUTO: 37.1 % (ref 40.5–52.5)
HGB BLD-MCNC: 12.8 G/DL (ref 13.5–17.5)
LYMPHOCYTES # BLD: 2.3 K/UL (ref 1–5.1)
LYMPHOCYTES NFR BLD: 27.6 %
MCH RBC QN AUTO: 32.8 PG (ref 26–34)
MCHC RBC AUTO-ENTMCNC: 34.5 G/DL (ref 31–36)
MCV RBC AUTO: 95.2 FL (ref 80–100)
MONOCYTES # BLD: 0.6 K/UL (ref 0–1.3)
MONOCYTES NFR BLD: 7.5 %
NEUTROPHILS # BLD: 5.1 K/UL (ref 1.7–7.7)
NEUTROPHILS NFR BLD: 63 %
PLATELET # BLD AUTO: 203 K/UL (ref 135–450)
PMV BLD AUTO: 7.9 FL (ref 5–10.5)
POTASSIUM SERPL-SCNC: 4.2 MMOL/L (ref 3.5–5.1)
RBC # BLD AUTO: 3.9 M/UL (ref 4.2–5.9)
SODIUM SERPL-SCNC: 136 MMOL/L (ref 136–145)
WBC # BLD AUTO: 8.2 K/UL (ref 4–11)

## 2023-10-11 PROCEDURE — 97129 THER IVNTJ 1ST 15 MIN: CPT

## 2023-10-11 PROCEDURE — 97112 NEUROMUSCULAR REEDUCATION: CPT

## 2023-10-11 PROCEDURE — 85025 COMPLETE CBC W/AUTO DIFF WBC: CPT

## 2023-10-11 PROCEDURE — 97542 WHEELCHAIR MNGMENT TRAINING: CPT

## 2023-10-11 PROCEDURE — 97530 THERAPEUTIC ACTIVITIES: CPT

## 2023-10-11 PROCEDURE — 97130 THER IVNTJ EA ADDL 15 MIN: CPT

## 2023-10-11 PROCEDURE — 1280000000 HC REHAB R&B

## 2023-10-11 PROCEDURE — 6370000000 HC RX 637 (ALT 250 FOR IP): Performed by: PHYSICAL MEDICINE & REHABILITATION

## 2023-10-11 PROCEDURE — 97535 SELF CARE MNGMENT TRAINING: CPT

## 2023-10-11 PROCEDURE — 80048 BASIC METABOLIC PNL TOTAL CA: CPT

## 2023-10-11 PROCEDURE — 36415 COLL VENOUS BLD VENIPUNCTURE: CPT

## 2023-10-11 RX ORDER — GABAPENTIN 300 MG/1
300 CAPSULE ORAL 3 TIMES DAILY
Status: DISCONTINUED | OUTPATIENT
Start: 2023-10-11 | End: 2023-10-12 | Stop reason: HOSPADM

## 2023-10-11 RX ADMIN — TRAZODONE HYDROCHLORIDE 50 MG: 50 TABLET ORAL at 21:52

## 2023-10-11 RX ADMIN — SERTRALINE HYDROCHLORIDE 25 MG: 25 TABLET ORAL at 08:45

## 2023-10-11 RX ADMIN — CLONIDINE HYDROCHLORIDE 0.1 MG: 0.1 TABLET ORAL at 20:15

## 2023-10-11 RX ADMIN — AMLODIPINE BESYLATE 10 MG: 10 TABLET ORAL at 08:45

## 2023-10-11 RX ADMIN — NYSTATIN 500000 UNITS: 100000 SUSPENSION ORAL at 08:45

## 2023-10-11 RX ADMIN — NYSTATIN 500000 UNITS: 100000 SUSPENSION ORAL at 17:29

## 2023-10-11 RX ADMIN — CLONIDINE HYDROCHLORIDE 0.1 MG: 0.1 TABLET ORAL at 08:45

## 2023-10-11 RX ADMIN — FAMOTIDINE 20 MG: 20 TABLET ORAL at 20:15

## 2023-10-11 RX ADMIN — HYDRALAZINE HYDROCHLORIDE 100 MG: 50 TABLET, FILM COATED ORAL at 20:25

## 2023-10-11 RX ADMIN — TRAMADOL HYDROCHLORIDE 50 MG: 50 TABLET ORAL at 02:38

## 2023-10-11 RX ADMIN — GABAPENTIN 300 MG: 300 CAPSULE ORAL at 13:55

## 2023-10-11 RX ADMIN — HYDRALAZINE HYDROCHLORIDE 100 MG: 50 TABLET, FILM COATED ORAL at 13:55

## 2023-10-11 RX ADMIN — ATORVASTATIN CALCIUM 40 MG: 40 TABLET, FILM COATED ORAL at 08:46

## 2023-10-11 RX ADMIN — SENNOSIDES AND DOCUSATE SODIUM 1 TABLET: 50; 8.6 TABLET ORAL at 08:45

## 2023-10-11 RX ADMIN — TRAMADOL HYDROCHLORIDE 50 MG: 50 TABLET ORAL at 12:05

## 2023-10-11 RX ADMIN — CARVEDILOL 12.5 MG: 12.5 TABLET, FILM COATED ORAL at 08:46

## 2023-10-11 RX ADMIN — SENNOSIDES AND DOCUSATE SODIUM 1 TABLET: 50; 8.6 TABLET ORAL at 20:15

## 2023-10-11 RX ADMIN — CLONIDINE HYDROCHLORIDE 0.1 MG: 0.1 TABLET ORAL at 13:55

## 2023-10-11 RX ADMIN — GABAPENTIN 100 MG: 100 CAPSULE ORAL at 08:45

## 2023-10-11 RX ADMIN — HYDRALAZINE HYDROCHLORIDE 100 MG: 50 TABLET, FILM COATED ORAL at 05:09

## 2023-10-11 RX ADMIN — NYSTATIN 500000 UNITS: 100000 SUSPENSION ORAL at 20:14

## 2023-10-11 RX ADMIN — FAMOTIDINE 20 MG: 20 TABLET ORAL at 08:45

## 2023-10-11 RX ADMIN — NYSTATIN 500000 UNITS: 100000 SUSPENSION ORAL at 12:07

## 2023-10-11 RX ADMIN — POLYETHYLENE GLYCOL 3350 17 G: 17 POWDER, FOR SOLUTION ORAL at 05:14

## 2023-10-11 RX ADMIN — GABAPENTIN 300 MG: 300 CAPSULE ORAL at 20:30

## 2023-10-11 RX ADMIN — CARVEDILOL 12.5 MG: 12.5 TABLET, FILM COATED ORAL at 17:29

## 2023-10-11 RX ADMIN — TIZANIDINE 2 MG: 4 TABLET ORAL at 21:51

## 2023-10-11 ASSESSMENT — PAIN DESCRIPTION - LOCATION
LOCATION: HIP
LOCATION: LEG
LOCATION: ARM
LOCATION: HIP
LOCATION: HIP

## 2023-10-11 ASSESSMENT — PAIN SCALES - GENERAL
PAINLEVEL_OUTOF10: 0
PAINLEVEL_OUTOF10: 0
PAINLEVEL_OUTOF10: 7
PAINLEVEL_OUTOF10: 8
PAINLEVEL_OUTOF10: 6
PAINLEVEL_OUTOF10: 8
PAINLEVEL_OUTOF10: 0
PAINLEVEL_OUTOF10: 0
PAINLEVEL_OUTOF10: 8
PAINLEVEL_OUTOF10: 0
PAINLEVEL_OUTOF10: 7
PAINLEVEL_OUTOF10: 0

## 2023-10-11 ASSESSMENT — PAIN - FUNCTIONAL ASSESSMENT
PAIN_FUNCTIONAL_ASSESSMENT: PREVENTS OR INTERFERES WITH ALL ACTIVE AND SOME PASSIVE ACTIVITIES
PAIN_FUNCTIONAL_ASSESSMENT: ACTIVITIES ARE NOT PREVENTED
PAIN_FUNCTIONAL_ASSESSMENT: ACTIVITIES ARE NOT PREVENTED
PAIN_FUNCTIONAL_ASSESSMENT: PREVENTS OR INTERFERES WITH MANY ACTIVE NOT PASSIVE ACTIVITIES

## 2023-10-11 ASSESSMENT — PAIN SCALES - WONG BAKER
WONGBAKER_NUMERICALRESPONSE: 0
WONGBAKER_NUMERICALRESPONSE: 2
WONGBAKER_NUMERICALRESPONSE: 0
WONGBAKER_NUMERICALRESPONSE: 2

## 2023-10-11 ASSESSMENT — PAIN DESCRIPTION - ORIENTATION
ORIENTATION: LEFT
ORIENTATION: RIGHT
ORIENTATION: LEFT

## 2023-10-11 ASSESSMENT — PAIN DESCRIPTION - DESCRIPTORS
DESCRIPTORS: SHOOTING;PINS AND NEEDLES
DESCRIPTORS: ACHING
DESCRIPTORS: PINS AND NEEDLES
DESCRIPTORS: ACHING

## 2023-10-11 NOTE — PROGRESS NOTES
Physical Therapy  Facility/Department: [address] REHAB  Rehabilitation Physical Therapy Treatment Note and Discharge    NAME: Luis Bloom  : 1970 (50 y.o.)  MRN: [id number]  CODE STATUS: Full Code    Date of Service: 10/11/23       Restrictions:  Restrictions/Precautions: Fall Risk, Aspiration Risk  Position Activity Restriction  Other position/activity restrictions: soft & bite sized diet w/ thin liquids, mod A to feed, signficant L neglect and L amando body is flaccid     Pertinent medical information:  Additional Pertinent Hx: per Dr. Yvette Louie - Patient is a 49 yo M with pmh NSTEMI () RBBB, HTN, HLD, CKD III, and chronic pancreatitis who initially presented to  on 2023 with acute onset left side weakness. He had been complaining of headache throughout the day prior to this. CT head revealed acute basal ganglia and right frontal lobe intraparenchymal hemorrhage w/ significant mass effect on the lateral ventricles R>L and leftward midline shift. CTA negative for vascular malformation. Systolic blood pressure was reportedly in the 250s on arrival. He received 500ml hypertonic saline, Keppra load, and was started on nicardipine gtt prior to being admitted to NSICU. He was started on PO HTN meds and weaned off nicardipine gttt on . Repeat [facility]  was stable. Course was complicated by dysphagia, difficult to control HTN. Now presents to ARU with impaired mobility, self-care, and cognition below his baseline. Currently, patient reports intermittent headache. He has severe left side weakness and decreased sensation left side. He has slurred speech. He has intermittent blurred vision. He is motivated to start inpatient rehabilitation program.    SUBJECTIVE  Subjective  Subjective: Patient supine in bed upon entry into room. Patient reporting sleeping good last night. Patient did not eat breakfast prior to session due to incorrect breakfast being brought.   Pain: Reported shooting like pain in

## 2023-10-11 NOTE — CARE COORDINATION
Call heidi'd from [facility] who states she came to see patient yesterday to discuss Transfer to [facility] for his dc on Thursday. She reports she detailed for him his Medicaid is what is called Incarcerated Medicaid and he would need to reapply for Medicaid from [address] [place] [address] and [facility] which they could assist him. In the conversation, she finds he is over the resource limit for Medicaid and would need to pay privately for SNF stay to spend down his over resources. [facility] is willing to take this referral as long as he pays privately for his stay at [address] [facility] to the point he is willing to then apply for Medicaid.   [place], [place]     Case Management   [phone]    10/11/2023  10:38 AM

## 2023-10-11 NOTE — PLAN OF CARE
death and feels physical/psychological comfort and peace:   Assess patient/family anxiety and grief process related to end of life issues   Provide emotional and spiritual support     Problem: Change in Body Image  Goal: Pt/Family communicate acceptance of loss or change in body image and feel psychological comfort and peace  Description: INTERVENTIONS:  1. Assess patient/family anxiety and grief process related to change in body image, loss of functional status, loss of sense of self, and forgiveness  2. Provide emotional and spiritual support  3. Provide information about the patient's health status with consideration of family and cultural values  4. Communicate willingness to discuss loss and facilitate grief process with patient/family as appropriate  5. Emphasize sustaining relationships within family system and community, or ange/spiritual traditions  6. Initiate Spiritual Care, Psychosocial Clinical Specialist consult as needed  Outcome: Progressing  Flowsheets (Taken 10/9/2023 1939)  Patient/family communicate acceptance of loss or change in body image and feel psychological comfort and peace:   Provide emotional and spiritual support   Assess patient/family anxiety and grief process related to change in body image, loss of functional status, loss of sense of self, and forgiveness     Problem: Depression/Self Harm  Goal: Effect of psychiatric condition will be minimized and patient will be protected from self harm  Description: INTERVENTIONS:  1. Assess impact of patient's symptoms on level of functioning, self care needs and offer support as indicated  2. Assess patient/family knowledge of depression, impact on illness and need for teaching  3. Provide emotional support, presence and reassurance  4. Assess for possible suicidal thoughts or ideation.  If patient expresses suicidal thoughts or statements do not leave alone, initiate Suicide Precautions, move to a room close to the nursing station and obtain

## 2023-10-11 NOTE — CARE COORDINATION
Met with patient to discuss options for DC for tomorrow. Informed him that per [facility], his Medicaid Number is not currently valid and he is over the resource limit for Medicaid. Informed him he would need need to pay privatley for SNF until he reached Medicaid approved assets level. Reviewed options for DC:   going home if he has 24 hour, hands on care in the home but he would not have access to home care since he has no insurance. Or, He can still go SNF [facility] ) has accepted him but he would need to pay approx $6K to get him to the approved level of income/assets to apply for Medicaid. He asked that I call his niece and sister to update. He wants to think about this. From the room, call placed to SisterDenisse; message left asking for returned call. Call placed to niece, Wanda Malone who states she has checked the Medicaid number herself and this information is not correct. She states she has called [employer] herself and placed his phone number and SS number in and it states he is active. Pt reports he had been incarcerated a year ago for lack of payment regarding child support. He was incarcerated and had been sent to  for blood pressure issues but that had been a year ago. The stroke happened this year and was with a friend in the car who was also a pharmacist who called 911. He states he has been  for a year. Tara Daily , Rep with [facility].     [place], [place]     Case Management   [phone]    10/11/2023  12:01 PM

## 2023-10-11 NOTE — FLOWSHEET NOTE
Patient admitted to ARU 9/19 with Dx of  nontraumatic intracranial hemorrhage. A/Ox4. Transfers with maximove x2. Mobility restrictions: Flaccid L side. On regular, soft, bite-size diet, tolerating well. Medications taken whole in apple sauce. On thin liquids now. Not on DVT prophylaxis d/t ICH. Skin: intact. Oxygen: RA. LDA: none. Has been incontinent of bowel and incontinent occasionally of bladder. LBM 10/7. PRN for constipation first line to be given. Monitor for results. No abdominal pain. Bowel sounds hypoactive on all four quadrants. N.O. of Gabapentin. Patient requested muscle relaxant for pain and tingling on his right leg and also requested prn trazodone to help him sleep. No other complaints. Chair/bed alarms in use and call light in reach.

## 2023-10-11 NOTE — PROGRESS NOTES
Demonstration;Verbal  Barriers to Learning: Cognition;Vision  Education Outcome: Continued education needed; Unable to demonstrate understanding;Verbalized understanding  Skilled Clinical Factors: poor motor planning, proprioception,  L neglect, decreased attention    Plan  Occupational Therapy Plan  Specific Instructions for Next Treatment: DC Th 10/12 to SNF  Current Treatment Recommendations: Strengthening;ROM;Balance training;Functional mobility training; Endurance training; Wheelchair mobility training; Safety education & training;Patient/Caregiver education & training;Equipment evaluation, education, & procurement;Self-Care / ADL;Neuromuscular re-education;Cognitive/Perceptual training;Positioning  Additional Comments: pt will need 24 hr care upon DC    Goals  Patient Goals   Patient goals : \"do what I normally do, walking outside\"  Short Term Goals  Time Frame for Short Term Goals: by 1-2 wks pt will complete  Short Term Goal 1: feeding & grooming tasks w/ SBA  Short Term Goal 2: UB dressing w/ mod A  Short Term Goal 3: LB dressing w/ mod A using A/E & amando tech--MET but inconsistently  Short Term Goal 4: toilet tasks w/ Mod A  Short Term Goal 5: household transfers/SPT w/ mod A of 1  Long Term Goals  Time Frame for Long Term Goals : by 3-4 wks pt will complete  Long Term Goal 1: UB dressing w/ min A  Long Term Goal 2: LB dressing w/ Min A  Long Term Goal 3: bathing tasks w/ min A using LH sponge &  amando tech  Long Term Goal 4: toilet tasks w/ min A  Long Term Goal 5: w/c mobility x 50 ft w/ SBA using R UE & LE  Long Term Goal 6: participate in OT/PT co tx to address sitting & standing balance, NDT, WBing & gait training to improve L amando body--MET  Long Term Goal 7: pt will sit on EOB x 10 minutes w/ CGA &  visually attend to midline<>L w/ 75% accuracy during ADLs, fxl act                   Therapy Time   Individual Concurrent PM  co tx Co-treatment   Time In 1000    1230 0915   Time Out [phone]

## 2023-10-11 NOTE — CARE COORDINATION
Niece, Elder Patterson, called back to state she spoke with his CM at [facility] [phone] who had to change a code in the system and he will show he has Medicaid for SNF stay. Called this to Jae Fregoso at [facility] to inform.   [place], [place]     Case Management   [phone]    10/11/2023  1:32 PM

## 2023-10-11 NOTE — PROGRESS NOTES
ACUTE REHAB UNIT  SPEECH/LANGUAGE PATHOLOGY      [x] Daily  [] Weekly Care Conference Note  [x] Discharge    Patient:Jono Givens      [facility]:[birth year]  ALFREDO:[id number]  Rehab Dx/Hx: Nontraumatic intracranial hemorrhage ([address]) [I62.9]    Precautions: Fall risk; left amando; visual deficits; cognitive deficits; dysphagia with aspiration risk  Home situation: Lives with family  ST Dx: [] Aphasia  [x] Dysarthria  [] Apraxia   [x] Oropharyngeal dysphagia [x] Cognitive Impairment  [] Other:   Initial Speech Therapy Assessment Diagnosis:   1. Cognitive Diagnosis: pt demonstrating mild to marked cognitive -linguistic deficits in domains of sustained/focused attention; left visual attention; insight/PS/VPS for safety ; working memory and STM; reasoning and math language. Pt with visual deficits characterized by poor attention to the left; left body neglect. Pt lethargy noted but pt was able to both physically / verbally actively participate in varied tasks. Flat affect and overall poor sustained visual attention even when right of midline. Problems wtih distractibility/task extinction; poor body awareness; safety awareness. Will initiate therapy and target attention; working memory and safety insight; and visual language function (using visual sscanning strategies for the latter)  2. Speech Diagnosis: Dysarthria features most symptomatic as pt fatigues with increase in phonemic distortions 2/2 to left om weakness which is exacerbated when fatigued. Pt is able to achieve audible/intelligible connected speech  3. Communication Diagnosis: Auditory processing is most optimal with concrete , short questions/statements and simple one step commands. Pt with attention/working memorya nd concern for complex auditory processing deficits. Visual language processing is markedly reduced due to visual inattention deficits and reduced pt insight.  Verbal expression is functional for conveying needs/wants; concrete confrontation naming and

## 2023-10-11 NOTE — PROGRESS NOTES
Patient admitted to rehab with nontraumatic intracranial hemmorrhage. A/Ox4. Transfers with maxi movex1. Mobility restrictions: flaccid L side. On soft bite sized diet, tolerating well. Medications taken whole with applesauce/thins. On none for DVT prophylaxis. Skin: intact. Oxygen: RA. LDA: NONE. Has been continent of bowel and incontient at times of bladder. LBM 10/7. Chair/bed alarms in use and call light in reach. Will monitor for safety.

## 2023-10-11 NOTE — PROGRESS NOTES
signs/symptoms  -CXR negative (9/25 and 9/28)   -UA negative (9/25 and 9/28)  -Blood cultures NGTD  -LFTs, procalcitonin, lactate wnl  -continue to monitor. H/o chronic pancreatitis  -Noted    Adjustment disorder with depressed mood  -Started sertraline (9/26), mood improving  -Psychiatry consulted, appreciate input  ---continue sertraline, can consider dose increase if needed  ---trial Xanax 0.25 TID prn anxiety (using sparingly)  -Has had intermittent hallucinations but these seem to be resolved. Arlette Diss  -continue Nystatin    Dysphagia   -Dietitian and SLP consults.   -Upgraded to soft/bite sized + thins     Bladder   -High risk retention   -Monitor PVRs, SC prn >300cc     Bowel   -High risk constipation   -senna+colace BID, PRN miralax, MoM, and bisacodyl supp. Safety   -fall and aspiration precautions     Pain control  -prn tiznidine, dc'ed Perocet and started Fioricet prn headaches, tramadol prn pain   -started gabapentin for left side paresthesias -- increase dose     PPx  -DVT: hold due to bleed risk  -GI: famotidine       Rehab Progress: Making gradual progress with regard to sitting balance, left neglect/sensation. Still requiring assist x 2 person and/or lift equipment for mobility, up to max-total assist for ADLs. Barriers: left hemiplegia, left neglect, insight. Anticipated Dispo: [facility]  ELOS: 10/12      Madeline.  Case Toro MD 10/11/2023, 10:03 AM

## 2023-10-12 VITALS
SYSTOLIC BLOOD PRESSURE: 129 MMHG | OXYGEN SATURATION: 99 % | DIASTOLIC BLOOD PRESSURE: 78 MMHG | WEIGHT: 181.88 LBS | BODY MASS INDEX: 24.63 KG/M2 | HEIGHT: 72 IN | RESPIRATION RATE: 18 BRPM | HEART RATE: 73 BPM | TEMPERATURE: 98.3 F

## 2023-10-12 PROCEDURE — 6370000000 HC RX 637 (ALT 250 FOR IP): Performed by: PHYSICAL MEDICINE & REHABILITATION

## 2023-10-12 PROCEDURE — 94760 N-INVAS EAR/PLS OXIMETRY 1: CPT

## 2023-10-12 RX ORDER — SERTRALINE HYDROCHLORIDE 25 MG/1
25 TABLET, FILM COATED ORAL DAILY
Qty: 30 TABLET | Refills: 0 | DISCHARGE
Start: 2023-10-12

## 2023-10-12 RX ORDER — BUTALBITAL, ACETAMINOPHEN AND CAFFEINE 50; 325; 40 MG/1; MG/1; MG/1
1 TABLET ORAL EVERY 6 HOURS PRN
Qty: 180 TABLET | Refills: 0 | DISCHARGE
Start: 2023-10-12

## 2023-10-12 RX ORDER — CARVEDILOL 12.5 MG/1
12.5 TABLET ORAL 2 TIMES DAILY WITH MEALS
DISCHARGE
Start: 2023-10-12

## 2023-10-12 RX ORDER — ALPRAZOLAM 0.25 MG/1
0.25 TABLET ORAL 2 TIMES DAILY PRN
Qty: 60 TABLET | Refills: 0 | Status: SHIPPED | OUTPATIENT
Start: 2023-10-12 | End: 2023-11-11

## 2023-10-12 RX ORDER — CLONIDINE HYDROCHLORIDE 0.1 MG/1
0.1 TABLET ORAL 3 TIMES DAILY
DISCHARGE
Start: 2023-10-12

## 2023-10-12 RX ORDER — TRAMADOL HYDROCHLORIDE 50 MG/1
50 TABLET ORAL EVERY 8 HOURS PRN
Qty: 15 TABLET | Refills: 0 | Status: SHIPPED | OUTPATIENT
Start: 2023-10-12 | End: 2023-10-17

## 2023-10-12 RX ORDER — GABAPENTIN 300 MG/1
300 CAPSULE ORAL 3 TIMES DAILY
Qty: 90 CAPSULE | Refills: 0 | Status: SHIPPED | OUTPATIENT
Start: 2023-10-12 | End: 2023-11-11

## 2023-10-12 RX ORDER — SENNA AND DOCUSATE SODIUM 50; 8.6 MG/1; MG/1
1 TABLET, FILM COATED ORAL 2 TIMES DAILY
DISCHARGE
Start: 2023-10-12

## 2023-10-12 RX ORDER — POLYETHYLENE GLYCOL 3350 17 G/17G
17 POWDER, FOR SOLUTION ORAL DAILY PRN
Qty: 527 G | Refills: 0 | DISCHARGE
Start: 2023-10-12 | End: 2023-11-12

## 2023-10-12 RX ORDER — TRAZODONE HYDROCHLORIDE 50 MG/1
50 TABLET ORAL NIGHTLY PRN
DISCHARGE
Start: 2023-10-12

## 2023-10-12 RX ADMIN — NYSTATIN 500000 UNITS: 100000 SUSPENSION ORAL at 08:36

## 2023-10-12 RX ADMIN — HYDRALAZINE HYDROCHLORIDE 100 MG: 50 TABLET, FILM COATED ORAL at 14:50

## 2023-10-12 RX ADMIN — ATORVASTATIN CALCIUM 40 MG: 40 TABLET, FILM COATED ORAL at 08:36

## 2023-10-12 RX ADMIN — SERTRALINE HYDROCHLORIDE 25 MG: 25 TABLET ORAL at 08:36

## 2023-10-12 RX ADMIN — TRAMADOL HYDROCHLORIDE 50 MG: 50 TABLET ORAL at 06:03

## 2023-10-12 RX ADMIN — CARVEDILOL 12.5 MG: 12.5 TABLET, FILM COATED ORAL at 08:36

## 2023-10-12 RX ADMIN — MAGNESIUM HYDROXIDE 30 ML: 400 SUSPENSION ORAL at 05:33

## 2023-10-12 RX ADMIN — GABAPENTIN 300 MG: 300 CAPSULE ORAL at 08:36

## 2023-10-12 RX ADMIN — CLONIDINE HYDROCHLORIDE 0.1 MG: 0.1 TABLET ORAL at 08:36

## 2023-10-12 RX ADMIN — POLYETHYLENE GLYCOL 3350 17 G: 17 POWDER, FOR SOLUTION ORAL at 05:33

## 2023-10-12 RX ADMIN — HYDRALAZINE HYDROCHLORIDE 100 MG: 50 TABLET, FILM COATED ORAL at 04:15

## 2023-10-12 RX ADMIN — AMLODIPINE BESYLATE 10 MG: 10 TABLET ORAL at 08:36

## 2023-10-12 RX ADMIN — DICLOFENAC SODIUM 4 G: 10 GEL TOPICAL at 04:03

## 2023-10-12 RX ADMIN — SENNOSIDES AND DOCUSATE SODIUM 1 TABLET: 50; 8.6 TABLET ORAL at 08:36

## 2023-10-12 RX ADMIN — GABAPENTIN 300 MG: 300 CAPSULE ORAL at 14:50

## 2023-10-12 RX ADMIN — FAMOTIDINE 20 MG: 20 TABLET ORAL at 08:36

## 2023-10-12 RX ADMIN — CLONIDINE HYDROCHLORIDE 0.1 MG: 0.1 TABLET ORAL at 14:51

## 2023-10-12 RX ADMIN — NYSTATIN 500000 UNITS: 100000 SUSPENSION ORAL at 14:51

## 2023-10-12 ASSESSMENT — PAIN SCALES - WONG BAKER
WONGBAKER_NUMERICALRESPONSE: 0

## 2023-10-12 ASSESSMENT — PAIN DESCRIPTION - FREQUENCY
FREQUENCY: CONTINUOUS
FREQUENCY: CONTINUOUS

## 2023-10-12 ASSESSMENT — PAIN SCALES - GENERAL
PAINLEVEL_OUTOF10: 5
PAINLEVEL_OUTOF10: 8
PAINLEVEL_OUTOF10: 8

## 2023-10-12 ASSESSMENT — PAIN DESCRIPTION - PAIN TYPE
TYPE: ACUTE PAIN
TYPE: ACUTE PAIN

## 2023-10-12 ASSESSMENT — PAIN DESCRIPTION - ORIENTATION
ORIENTATION: LEFT

## 2023-10-12 ASSESSMENT — PAIN DESCRIPTION - LOCATION
LOCATION: ARM

## 2023-10-12 ASSESSMENT — PAIN DESCRIPTION - DESCRIPTORS
DESCRIPTORS: TINGLING
DESCRIPTORS: ACHING;TINGLING
DESCRIPTORS: TINGLING

## 2023-10-12 ASSESSMENT — PAIN DESCRIPTION - DIRECTION
RADIATING_TOWARDS: L LEG
RADIATING_TOWARDS: L LEG

## 2023-10-12 ASSESSMENT — PAIN - FUNCTIONAL ASSESSMENT
PAIN_FUNCTIONAL_ASSESSMENT: PREVENTS OR INTERFERES WITH ALL ACTIVE AND SOME PASSIVE ACTIVITIES
PAIN_FUNCTIONAL_ASSESSMENT: PREVENTS OR INTERFERES SOME ACTIVE ACTIVITIES AND ADLS
PAIN_FUNCTIONAL_ASSESSMENT: PREVENTS OR INTERFERES SOME ACTIVE ACTIVITIES AND ADLS

## 2023-10-12 ASSESSMENT — PAIN DESCRIPTION - ONSET
ONSET: ON-GOING
ONSET: ON-GOING

## 2023-10-12 NOTE — CARE COORDINATION
Patient has been approved for Transfer to [facility] for today's discharge after Level of Care has been approved and Passar. While completing Passar and Level of Care, I have to upload his Power of Shon Thompson that samuel reports she has. Call placed to Niece who needs to leave work, go home and will email back to me through her [employer] address (Works at [address] [place] [address]). Pushed back the  time with Transport to 5:30 pm today. Updated Bedside RN. Rec'd POwer of Shon Thompson. Completed Passar and Level of Care. Faxed to [phone].   [place], [place]     Case Management   [phone]    10/12/2023  2:24 PM

## 2023-10-12 NOTE — DISCHARGE SUMMARY
hemiplegia. Till requiring assist x 2 person or use of lift equipment for transfers. Recommending ongoing PT/OT/SLP in SNF setting. Patient will follow-up with Neurology. Impairments:  left hemiplegia, left amando sensory deficit and neglect, left facial weakness with dysarthria, dysphagia, cognition    Medical Management:    Nontraumatic R basal ganglia and frontal lobe intraparenchymal hemorrhage with mass effect and midline shift  -Managed non-operatively per Nsgy at   -Etiology suspected uncontrolled HTN  -BP control as below  -Holding ASA  -completed Keppra for seizure ppx  -PT/OT/SLP     Palpitations, chest pressure (10/2)  H/o NSTEMI (2020), RBBB  -Symptoms self-resolved quickly. -VS and exam stable. -EKG with known RBBB. -Troponin negative  -Anxiety possibly contributed  -ASA has been on hold due to [address]  -continue statin, bb, arb     Enlarged intraventricular septum  -TTE (9/15 at [place]): EF 65-70%, normal WM, asymmetrically hypertrophied interventricular septum, grade I diastolic dysfunction and mild mitral regurgitation.   -Consider outpatient cardiac MRI for further evaluation. MALORIE on CKD III  -Suspect from low po intake. Improved with IVF and holding valsartan.  -Avoid nephrotoxins, renally dose meds  -Cr now stable 1.2-1.4     HTN, uncontrolled --> now improving  -continue amlodipine, carvedilol (decreased dose due to bradycardia), hydralazine (increased dose), clonidine (doses decreased)  -continue to hold valsartan     Leukocytosis -- now resolved  -Afebrile, no localizing signs/symptoms  -CXR negative (9/25 and 9/28)   -UA negative (9/25 and 9/28)  -Blood cultures NGTD  -LFTs, procalcitonin, lactate wnl.       H/o chronic pancreatitis  -Noted, no evidence of this currently     Adjustment disorder with depressed mood  -Started sertraline (9/26), mood improving  -Psychiatry consulted, appreciate input  ---continue sertraline, can consider dose increase if needed  ---trial Xanax 0.25 TID prn

## 2023-10-12 NOTE — CARE COORDINATION
Spoke with Sherryle Halls, Supervisor at [facility] to review challenges/concerns they have about his Medicaid Status. Informed her that he is total care patient and that he has been in our Unit for 23 days thus far and still is not ambulating. He will need to be in their facility for the care and hopeful for therapy to assist this gentleman. He would need to enter their facility as Pending Medicaid and once in their facility, they will need to send appropriate documentation to apply for Medicaid for long term care. Provided update to her that he is total care, reviewed therapy notes to confirm his needs physically. Informed her that family has been VERY helpful with dc planning needs and is willing to assist with any needs to get financial help. Family has stated he has no bank accounts and has been living with niece who works. Family reports he has not been working for the past five months prior to admit. Sherryle Halls Supervisor will call back.   [place], [place]     Case Management   [phone]    10/12/2023  10:19 AM

## 2023-10-12 NOTE — PLAN OF CARE
Problem: Discharge Planning  Goal: Discharge to home or other facility with appropriate resources  10/12/2023 0930 by Nicole Alas RN  Outcome: Progressing     Problem: Pain  Goal: Verbalizes/displays adequate comfort level or baseline comfort level  10/12/2023 0930 by Nicole Alas RN  Outcome: Progressing     Problem: Skin/Tissue Integrity  Goal: Absence of new skin breakdown  Description: 1. Monitor for areas of redness and/or skin breakdown  2. Assess vascular access sites hourly  3. Every 4-6 hours minimum:  Change oxygen saturation probe site  4. Every 4-6 hours:  If on nasal continuous positive airway pressure, respiratory therapy assess nares and determine need for appliance change or resting period.   10/12/2023 0930 by Nicole Alas RN  Outcome: Progressing     Problem: ABCDS Injury Assessment  Goal: Absence of physical injury  10/12/2023 0930 by Nicole Alas RN  Outcome: Progressing     Problem: Neurosensory - Adult  Goal: Achieves stable or improved neurological status  10/12/2023 0930 by Nicole Alas RN  Outcome: Progressing     Problem: Neurosensory - Adult  Goal: Achieves maximal functionality and self care  10/12/2023 0930 by Nicole Alas RN  Outcome: Progressing     Problem: Neurosensory - Adult  Goal: Absence of seizures  10/12/2023 0930 by Nicole Alas RN  Outcome: Progressing

## 2023-10-12 NOTE — PLAN OF CARE
output and hemodynamic stability  Outcome: Progressing  Goal: Absence of cardiac dysrhythmias or at baseline  Outcome: Progressing     Problem: Gastrointestinal - Adult  Goal: Minimal or absence of nausea and vomiting  Outcome: Progressing  Goal: Maintains or returns to baseline bowel function  Outcome: Progressing  Goal: Maintains adequate nutritional intake  Outcome: Progressing     Problem: Genitourinary - Adult  Goal: Absence of urinary retention  Outcome: Progressing     Problem: Metabolic/Fluid and Electrolytes - Adult  Goal: Electrolytes maintained within normal limits  Outcome: Progressing  Goal: Hemodynamic stability and optimal renal function maintained  Outcome: Progressing  Goal: Glucose maintained within prescribed range  Outcome: Progressing     Problem: Hematologic - Adult  Goal: Maintains hematologic stability  Outcome: Progressing     Problem: Chronic Conditions and Co-morbidities  Goal: Patient's chronic conditions and co-morbidity symptoms are monitored and maintained or improved  Outcome: Progressing     Problem: Safety - Adult  Goal: Free from fall injury  Outcome: Progressing     Problem: Nutrition Deficit:  Goal: Optimize nutritional status  Outcome: Progressing     Problem: Coping  Goal: Pt/Family able to verbalize concerns and demonstrate effective coping strategies  Description: INTERVENTIONS:  1. Assist patient/family to identify coping skills, available support systems and cultural and spiritual values  2. Provide emotional support, including active listening and acknowledgement of concerns of patient and caregivers  3. Reduce environmental stimuli, as able  4. Instruct patient/family in relaxation techniques, as appropriate  5.  Assess for spiritual pain/suffering and initiate Spiritual Care, Psychosocial Clinical Specialist consults as needed  Outcome: Progressing

## 2023-10-12 NOTE — PROGRESS NOTES
Patient admitted to rehab with nontraumatic intracranial hemorrhage. A/Ox4. Transfers with berny plus x2 or maximove. Mobility restrictions: flaccid L side. On dysphagia, soft bite size diet, tolerating well. Medications taken whole in applesauce. On nothing for DVT prophylaxis. Skin: intact. Oxygen: RA. LDA: none. Has been incontinent of bowel and incontinent of bladder. LBM 10/7. Chair/bed alarms in use and call light in reach. Will monitor for safety.  Electronically signed by Martita Pedraza RN on 10/12/2023 at 9:33 AM

## 2023-10-12 NOTE — CARE COORDINATION
Call placed to Lakesha Caban from [facility] who reports his Medicaid Number is now good for SNF. Arranged for 12:15 am . Call placed to [facility] to inform. Message left. Call placed to , [employer] to inform. Message left. Updated bedside RN.   [place], [place]     Case Management   [phone]    10/12/2023  9:03 AM

## 2023-10-12 NOTE — FLOWSHEET NOTE
Patient admitted to ARU on 9/19 with Dx of nontraumatic intracranial hemmorrhage. A/Ox4. Transfers with maxi movex1. Mobility restrictions: flaccid L side. On soft bite sized diet, tolerating well. Medications taken whole with applesauce/thin liquids. Likes apple juice. On no medication for DVT prophylaxis d/t risk for bleeding. Skin: intact. Oxygen: RA. LDA: NONE. Has been continent of bowel and often incontinent of bladder. Needs assist in using the urinal . LBM 10/7. Prn meds given for constipation. Monitor for results. Chair/bed alarms in use and call light in reach. D/C to SNF on 10/12/23.

## 2023-10-12 NOTE — CARE COORDINATION
SOCIAL WORK DISCHARGE SUMMARY        DATE OF DISCHARGE:    Thursday, 10-      LOCATION:    [address] [facility]      Discharging to Facility/ Agency   Name: [facility]  Address:  [address] [facility], [address] [place] [address]   Phone:  [phone]  Fax:  [phone]     at [address] [place] [address] pm   [address] [place] [address]     Case Management   [phone]    10/12/2023  2:27 PM

## 2023-10-12 NOTE — CARE COORDINATION
Passar and Level of Care has been completed and results returned. Faxed them to [facility]. Spoke with Rep who states they are good to accept. Updated Bedside RN. He is good to be released at 5:15 pm.  Updated Genna Brenner with phone message.   [place], [place]     Case Management   [phone]    10/12/2023  4:30 PM